# Patient Record
Sex: FEMALE | Race: WHITE | Employment: OTHER | URBAN - METROPOLITAN AREA
[De-identification: names, ages, dates, MRNs, and addresses within clinical notes are randomized per-mention and may not be internally consistent; named-entity substitution may affect disease eponyms.]

---

## 2017-01-16 ENCOUNTER — TELEPHONE (OUTPATIENT)
Dept: SURGERY | Facility: CLINIC | Age: 52
End: 2017-01-16

## 2017-01-16 RX ORDER — FENTANYL 50 UG/H
1 PATCH TRANSDERMAL
Qty: 10 PATCH | Refills: 0 | Status: SHIPPED | OUTPATIENT
Start: 2017-01-16 | End: 2017-02-08 | Stop reason: SINTOL

## 2017-01-16 NOTE — TELEPHONE ENCOUNTER
Patient informed of 48 hour refill policy excluding weekends and holidays. Further explained patient will not receive a call back once prescription is ready. Pt's  Binh Jasmine will  medication at [place] office, [address].

## 2017-01-16 NOTE — TELEPHONE ENCOUNTER
Spoke with pt who is requesting a Fentanyl patch increase. Pt is currently on Fentanyl 25 mcg. Pt has used Fentanyl patch 50 mcg in the past and had pain relief while using it. Pt was previously instructed by her PCP to apply two Fentanyl 25mcg patches.  Pt

## 2017-01-16 NOTE — TELEPHONE ENCOUNTER
Pt was using 2 patches for a period of time d.t dr Jessica Santizo rec to apply two patches so she is early on refill. I have approved increase to 50 mcg fentanyl patch and early refill. Pt has cancer pain.

## 2017-01-16 NOTE — TELEPHONE ENCOUNTER
Patient made aware of message below. Bethany Painter appreciative. No further needs.     Rx ready for pickup, at  in [address] [facility] office, [address]

## 2017-01-26 NOTE — TELEPHONE ENCOUNTER
Followed up with Dr. Salinas Later regarding message below. Per Dr. Salinas Later, pt should decrease Fentanyl dose to 25mcg/hr patch as 50mcg/hr patch causing undesirable side effects. Called pt and informed her of feedback from Dr. Salinas Later.   Pt states she is currently

## 2017-01-26 NOTE — TELEPHONE ENCOUNTER
Spoke with pt who reports removing her 50mcg Fentanyl patch d/t side effects. Pt reports side effects of nausea and vomiting. Pt states that she took off the 50mcg Fentanyl patch d/t vomiting.  After removing the 50mcg Fentanyl patch pt was feeling shaky an

## 2017-01-27 RX ORDER — FENTANYL 25 UG/H
1 PATCH TRANSDERMAL
Qty: 10 PATCH | Refills: 0 | Status: SHIPPED | OUTPATIENT
Start: 2017-01-27 | End: 2017-02-08

## 2017-01-27 NOTE — TELEPHONE ENCOUNTER
Patient called and side effects at 50 µg were confirmed. Prescription was authorized for fentanyl 25 µg every 72 hours 10 patches were dispensed. She will follow-up with Dr. Barron Conway as scheduled.     Placed at 26 for  to pickup

## 2017-02-08 ENCOUNTER — OFFICE VISIT (OUTPATIENT)
Dept: SURGERY | Facility: CLINIC | Age: 52
End: 2017-02-08

## 2017-02-08 VITALS
HEIGHT: 63 IN | DIASTOLIC BLOOD PRESSURE: 82 MMHG | HEART RATE: 82 BPM | WEIGHT: 107 LBS | RESPIRATION RATE: 18 BRPM | SYSTOLIC BLOOD PRESSURE: 113 MMHG | BODY MASS INDEX: 18.96 KG/M2

## 2017-02-08 DIAGNOSIS — M79.7 FIBROMYALGIA: ICD-10-CM

## 2017-02-08 DIAGNOSIS — G04.90 LIMBIC ENCEPHALITIS: Primary | ICD-10-CM

## 2017-02-08 PROCEDURE — 99214 OFFICE O/P EST MOD 30 MIN: CPT | Performed by: ANESTHESIOLOGY

## 2017-02-08 RX ORDER — FENTANYL 25 UG/H
1 PATCH TRANSDERMAL
Qty: 10 PATCH | Refills: 0 | Status: SHIPPED | OUTPATIENT
Start: 2017-02-08 | End: 2017-03-08

## 2017-02-08 RX ORDER — FENTANYL 12 UG/H
1 PATCH TRANSDERMAL
Qty: 10 PATCH | Refills: 0 | Status: SHIPPED | OUTPATIENT
Start: 2017-02-08 | End: 2017-03-08

## 2017-02-08 NOTE — PATIENT INSTRUCTIONS
Refill policies:    • Allow 2 business days for refills; controlled substances may take longer.   • Contact your pharmacy at least 5 days prior to running out of medication and have them send an electronic request or submit request through the “request re your physician has recommended that you have a procedure or additional testing performed. [facility] will contact your insurance carrier to obtain pre-certification or prior authorization.     Unfortunately, [facility] has seen an increas

## 2017-02-12 NOTE — PROGRESS NOTES
Name: William Horton   : 10/5/1965   DOS: 2017     Pain Clinic Follow Up Visit:   William Horton is a 46year old female who presents for recheck of her chronic generalized pain.   The patient had an capsulitis after that she double up autoimmune positive bilaterally. Flexion of the spine makes the pain better, extension and lateral rotation of the spine makes the pain worse. Straight leg raising test is negative bilaterally now. Motor 5 out of 5, sensory is intact and reflexes 2+.  Dorsalis pedis i

## 2017-02-15 ENCOUNTER — PATIENT MESSAGE (OUTPATIENT)
Dept: SURGERY | Facility: CLINIC | Age: 52
End: 2017-02-15

## 2017-02-16 NOTE — TELEPHONE ENCOUNTER
Spoke with pt, scheduled follow up appointment for tomorrow 2/17/2017 @ 1130am. Pt thankful, no further questions at this time.

## 2017-02-16 NOTE — TELEPHONE ENCOUNTER
Lalo Camarillo 2/16/2017 7:42 AM CST        ----- Message -----   From:  Williams Masters   Sent: 2/15/2017 6:14 PM   To: [facility]  Subject: Visit Follow-up Question     Dear Dr Salinas Later,  In the early afternoon on Tuesday, February 14, my vomitin

## 2017-02-16 NOTE — TELEPHONE ENCOUNTER
Notified Dr. Mariela Turk of patient message, Dr. Mariela Turk requests that she be worked into the schedule tomorrow, 2/17/17 to discuss in the office. Thanks!

## 2017-02-17 ENCOUNTER — OFFICE VISIT (OUTPATIENT)
Dept: SURGERY | Facility: CLINIC | Age: 52
End: 2017-02-17

## 2017-02-17 VITALS
SYSTOLIC BLOOD PRESSURE: 118 MMHG | WEIGHT: 106 LBS | DIASTOLIC BLOOD PRESSURE: 64 MMHG | RESPIRATION RATE: 16 BRPM | HEIGHT: 63 IN | BODY MASS INDEX: 18.78 KG/M2 | HEART RATE: 96 BPM

## 2017-02-17 DIAGNOSIS — G04.90 LIMBIC ENCEPHALITIS: Primary | ICD-10-CM

## 2017-02-17 DIAGNOSIS — M79.7 FIBROMYALGIA: ICD-10-CM

## 2017-02-17 PROCEDURE — 99214 OFFICE O/P EST MOD 30 MIN: CPT | Performed by: ANESTHESIOLOGY

## 2017-02-17 NOTE — PATIENT INSTRUCTIONS
Refill policies:    • Allow 2 business days for refills; controlled substances may take longer.   • Contact your pharmacy at least 5 days prior to running out of medication and have them send an electronic request or submit request through the “request re your physician has recommended that you have a procedure or additional testing performed. [facility] will contact your insurance carrier to obtain pre-certification or prior authorization.     Unfortunately, [facility] has seen an increas

## 2017-02-18 NOTE — PROGRESS NOTES
Name: Nabeel Garrett   : 10/5/1965   DOS: 2017     Pain Clinic Follow Up Visit:   Nabeel Garrett is a 46year old female who presents for recheck of her chronic generalized pain.   The patient had limbic encephalitis after that she developed aut Maryanne Marlene test is positive bilaterally. Flexion of the spine makes the pain better, extension and lateral rotation of the spine makes the pain worse. Straight leg raising test is negative bilaterally now. Motor 5 out of 5, sensory is intact and reflexes 2+.

## 2017-02-23 ENCOUNTER — TELEPHONE (OUTPATIENT)
Dept: FAMILY MEDICINE CLINIC | Facility: CLINIC | Age: 52
End: 2017-02-23

## 2017-02-23 NOTE — TELEPHONE ENCOUNTER
Spoke to pt to inform her to schedule her Medicare Annual Well Visit w/Dr Brea Olvera  Pt DECLINED at this time

## 2017-03-07 NOTE — TELEPHONE ENCOUNTER
Patient informed of 48 hour refill policy excluding weekends and holidays. Further explained patient will not receive a call back once prescription is ready. Pierre Ohara will  medication at [place], [address].

## 2017-03-08 NOTE — TELEPHONE ENCOUNTER
**PATIENT NEEDS NARCOTIC CONTRACT ON FILE**    Medication: Fentanyl 12 MCG/HR,  Fentanyl 25 MCG/HR    Date of last refill: both filled 2/8/17  [place] Reviewed Fentanyl 12- 2/9/17,  Fentanyl 25- 2/19/17    Last office vi

## 2017-03-09 NOTE — TELEPHONE ENCOUNTER
i have approved the two fentanyl rx's even though patient is not here to sign narcotic agreement. Pt's  here to . Pt has appoitment with Dr Chelsey Fenton on Monday.  I would like pt to come in Monday to sign agreement and she will need Urine tox screen

## 2017-03-13 ENCOUNTER — APPOINTMENT (OUTPATIENT)
Dept: HEMATOLOGY/ONCOLOGY | Facility: HOSPITAL | Age: 52
End: 2017-03-13
Attending: INTERNAL MEDICINE
Payer: MEDICARE

## 2017-03-16 ENCOUNTER — APPOINTMENT (OUTPATIENT)
Dept: HEMATOLOGY/ONCOLOGY | Facility: HOSPITAL | Age: 52
End: 2017-03-16
Attending: INTERNAL MEDICINE
Payer: MEDICARE

## 2017-03-28 ENCOUNTER — TELEPHONE (OUTPATIENT)
Dept: SURGERY | Facility: CLINIC | Age: 52
End: 2017-03-28

## 2017-03-29 NOTE — TELEPHONE ENCOUNTER
Spoke with pt who would like to wean off the Fentanyl patch. Pt states that she does not feel like she needs the Fentanyl patch anymore. Pt states that she has a few Fentanyl patches 12mcg and 25mcg left.  Pt scheduled follow up appointment for 4/5/2017 @ 9

## 2017-04-05 ENCOUNTER — NURSE ONLY (OUTPATIENT)
Dept: HEMATOLOGY/ONCOLOGY | Facility: HOSPITAL | Age: 52
End: 2017-04-05
Attending: INTERNAL MEDICINE
Payer: MEDICARE

## 2017-04-05 ENCOUNTER — OFFICE VISIT (OUTPATIENT)
Dept: SURGERY | Facility: CLINIC | Age: 52
End: 2017-04-05

## 2017-04-05 VITALS
RESPIRATION RATE: 16 BRPM | DIASTOLIC BLOOD PRESSURE: 68 MMHG | HEART RATE: 79 BPM | BODY MASS INDEX: 19.67 KG/M2 | HEIGHT: 63 IN | SYSTOLIC BLOOD PRESSURE: 112 MMHG | WEIGHT: 111 LBS

## 2017-04-05 DIAGNOSIS — M79.7 FIBROMYALGIA: ICD-10-CM

## 2017-04-05 DIAGNOSIS — G04.90 LIMBIC ENCEPHALITIS: Primary | ICD-10-CM

## 2017-04-05 DIAGNOSIS — D50.0 IRON DEFICIENCY ANEMIA SECONDARY TO BLOOD LOSS (CHRONIC): Primary | ICD-10-CM

## 2017-04-05 DIAGNOSIS — M79.18 MYOFASCIAL PAIN SYNDROME: ICD-10-CM

## 2017-04-05 PROCEDURE — 99213 OFFICE O/P EST LOW 20 MIN: CPT | Performed by: NURSE PRACTITIONER

## 2017-04-05 PROCEDURE — 85025 COMPLETE CBC W/AUTO DIFF WBC: CPT

## 2017-04-05 PROCEDURE — 36415 COLL VENOUS BLD VENIPUNCTURE: CPT

## 2017-04-05 PROCEDURE — 82728 ASSAY OF FERRITIN: CPT

## 2017-04-05 RX ORDER — FENTANYL 12 UG/H
1 PATCH TRANSDERMAL
Qty: 10 PATCH | Refills: 0 | Status: SHIPPED | OUTPATIENT
Start: 2017-04-05 | End: 2017-05-01

## 2017-04-05 NOTE — PATIENT INSTRUCTIONS
Refill policies:    • Allow 2 business days for refills; controlled substances may take longer.   • Contact your pharmacy at least 5 days prior to running out of medication and have them send an electronic request or submit request through the “request re insurance carrier to obtain pre-certification or prior authorization. Unfortunately, [facility] has seen an increase in denial of payment even though the procedure/test has been pre-certified.   You are strongly encouraged to contact your insurance carrier to v

## 2017-04-06 ENCOUNTER — TELEPHONE (OUTPATIENT)
Dept: SURGERY | Facility: CLINIC | Age: 52
End: 2017-04-06

## 2017-04-06 NOTE — TELEPHONE ENCOUNTER
Spoke w/ pt who states she has symptoms of \"skin crawling\" and feels extremely uncomfortable. Pt concerned she will not be able to tolerate symptoms through process and was told she should inform the office. Pt states she has been unable to sleep.   Inf

## 2017-04-06 NOTE — TELEPHONE ENCOUNTER
Discuss with Dr. Namrata Stinson, patient beginning tapering Fentanyl per instructions given yesterday. She reported a crawling sensation today which has been known to her in the past when trying to taper this medication.  She was instructed that this symptom should b

## 2017-04-07 NOTE — TELEPHONE ENCOUNTER
I have discussed with Dr. Marialuisa Moran, he recommends if she can hang in there a few days these symptoms should resolve. She should try conservative measures such as yoga, stretching, prayer, meditation.  She can try an over the counter capsaicin cream (made from p

## 2017-04-12 RX ORDER — GABAPENTIN 100 MG/1
CAPSULE ORAL
Qty: 270 CAPSULE | Refills: 0 | OUTPATIENT
Start: 2017-04-12

## 2017-04-12 RX ORDER — GABAPENTIN 100 MG/1
100 CAPSULE ORAL EVERY 8 HOURS
Qty: 90 CAPSULE | Refills: 0 | Status: SHIPPED | OUTPATIENT
Start: 2017-04-12 | End: 2017-07-27

## 2017-04-12 RX ORDER — FENTANYL 25 UG/H
1 PATCH TRANSDERMAL
Qty: 5 PATCH | Refills: 0 | Status: SHIPPED | OUTPATIENT
Start: 2017-04-18 | End: 2017-05-01

## 2017-04-12 NOTE — TELEPHONE ENCOUNTER
Patient informed of message below and understood. She will call us if there are any issues with the gabapentin. She also stated she would need more patches to complete the 4 week tapering of the Fentanyl 25 mcg.     Fentanyl 25 mg #10 were filled on 3/20/17

## 2017-04-12 NOTE — TELEPHONE ENCOUNTER
Script for additional patches were filled and faxed to patient's pharmacy with below tapering instructions. Confirmation received.

## 2017-04-12 NOTE — TELEPHONE ENCOUNTER
Patient called back and was informed of the message below.  Patient also stated she wanted to let our office know that when she decreased the fentanyl patch from 50 mcg to 25 mcg within 24 hours she had a seizure (thursday night 4/6/17.) States she is doing

## 2017-04-12 NOTE — TELEPHONE ENCOUNTER
Given this circumstance, Dr. Naty Porter recommends for her to stay on Fentanyl 25mcg for a full 4 weeks, then decrease to Fentanyl 12mcg for 6 weeks. He also recommends that she try gabapentin 100mg every 8 hours.  I will send the script to her pharmacy for the g

## 2017-04-13 ENCOUNTER — APPOINTMENT (OUTPATIENT)
Dept: HEMATOLOGY/ONCOLOGY | Facility: HOSPITAL | Age: 52
End: 2017-04-13
Payer: MEDICARE

## 2017-04-17 NOTE — PROGRESS NOTES
Name: Leonid Galindo   : 10/5/1965   DOS: 2017     Pain Clinic Follow Up Visit:   Leonid Galindo is a 46year old female who presents for recheck of her chronic generalized pain. She was last seen in our office on 2017.   She is he Pulse 79  Resp 16  Ht 63\"  Wt 111 lb  BMI 19.67 kg/m2  General: 46year old female in no acute distress. Neurologic: Alert, oriented, articulate. Normal gait. Psychiatric: Appropriate mood.     ASSESSMENT AND PLAN:   Limbic encephalitis  (primary encoun contact our office if this medication is not sufficient to help manage pain. Other treatment options were discussed with Dr. June Ortiz today including topical options, he does not feel that these would be helpful to manage crawling skin sensation.   I have d

## 2017-04-20 ENCOUNTER — PATIENT OUTREACH (OUTPATIENT)
Dept: CASE MANAGEMENT | Age: 52
End: 2017-04-20

## 2017-04-20 ENCOUNTER — TELEPHONE (OUTPATIENT)
Dept: FAMILY MEDICINE CLINIC | Facility: CLINIC | Age: 52
End: 2017-04-20

## 2017-04-20 ENCOUNTER — TELEPHONE (OUTPATIENT)
Dept: CASE MANAGEMENT | Age: 52
End: 2017-04-20

## 2017-04-20 DIAGNOSIS — E03.9 ACQUIRED HYPOTHYROIDISM: Primary | Chronic | ICD-10-CM

## 2017-04-20 RX ORDER — LEVOTHYROXINE SODIUM 0.1 MG/1
100 TABLET ORAL
Qty: 90 TABLET | Refills: 1 | Status: SHIPPED | OUTPATIENT
Start: 2017-04-20 | End: 2017-11-11

## 2017-04-20 RX ORDER — LEVOTHYROXINE SODIUM 0.1 MG/1
100 TABLET ORAL
Qty: 90 TABLET | Refills: 1 | COMMUNITY
Start: 2017-04-20 | End: 2017-04-24

## 2017-04-20 RX ORDER — LEVOTHYROXINE SODIUM 0.1 MG/1
TABLET ORAL
Qty: 90 TABLET | Refills: 0 | OUTPATIENT
Start: 2017-04-20

## 2017-04-20 NOTE — PROGRESS NOTES
4/20/2017  Called and LM on identified home phone requesting patient return my call at [phone]. Called to introduce CCM service.

## 2017-04-20 NOTE — TELEPHONE ENCOUNTER
Just spoke to pt who reports she is currently out of Levothyroxine and needs refill. She thought pharmacy had contacted the office but is unsure.    Thyroid:    Lab Results  Component Value Date   TSH 2.160 05/17/2016   TSH 11.000* 04/11/2016   TSH 0.099* 0

## 2017-04-20 NOTE — TELEPHONE ENCOUNTER
LOV 10/06/2016 with Dr Donavan Severe the RX for levothyroxine 100 mcg is new with hospitalization so not on protocol as it is new med to patient

## 2017-04-20 NOTE — PROGRESS NOTES
4/20/2017  Returned incoming call to pt. Pt states she her Rx for Gabapentin was denied through [address] Explained to pt that initial script looks like it was 90 days and insurance may only cover 30 days - which new script was sent for.  Informed pt I conta

## 2017-04-20 NOTE — TELEPHONE ENCOUNTER
Spoke to pt who states she has had 2 recent seizures on 04/13 & 04/17 which she said PCP is aware of. She is weaning off Fentanyl and reports \"creepy crawling\" feeling on skin and unable to sit still - describes Akathisia.  Informed pt script for Gabapent

## 2017-04-21 ENCOUNTER — PATIENT OUTREACH (OUTPATIENT)
Dept: CASE MANAGEMENT | Age: 52
End: 2017-04-21

## 2017-04-21 NOTE — TELEPHONE ENCOUNTER
Called and spoke with pt regarding recommendation below. Pt picked up script last night and started script as Gabapentin 100 mg every 8 hours as ordered by [facility].  Pt reports initially felt like akathisia was worse upon taking Gabapentin; however,

## 2017-04-21 NOTE — PROGRESS NOTES
4/21/2017  Called to f/up with pt regarding akathisia sx and f/up after starting Gabapentin. Pt reports started Gabapentin last night and initially did not feel better but reports feeling worse.  However, pt states she has taken 3 doses as of now, and repor

## 2017-04-24 ENCOUNTER — TELEPHONE (OUTPATIENT)
Dept: CASE MANAGEMENT | Age: 52
End: 2017-04-24

## 2017-04-24 ENCOUNTER — PATIENT OUTREACH (OUTPATIENT)
Dept: CASE MANAGEMENT | Age: 52
End: 2017-04-24

## 2017-04-24 NOTE — TELEPHONE ENCOUNTER
From what I understand, Dr. Lashell Stanford gave orders for Gabapentin and were placed by Jacobo JEFFERS.

## 2017-04-24 NOTE — TELEPHONE ENCOUNTER
Reviewed OV note from 4/5/17, [facility] Reeves, noted \"asked for her to contact our office she experiences any difficulty weaning off of fentanyl and we will can continue to consider other nonnarcotic options if needed. \"    Please have patient contact EN

## 2017-04-24 NOTE — PROGRESS NOTES
4/24/2017  Returned incoming call from pt who reports she is taking Gabapentin every 8 hours as instructed; however, has noticed no significant changes in sx.  States still having \"creepy crawling skin feeling\", profuse sweating episodes, along with pacin

## 2017-04-24 NOTE — TELEPHONE ENCOUNTER
Dr Veronica Gutiérrez did not order Neurontin. Pt is seeing neurology for these symptoms. Was this suppose to go to Neurology ? Routed to [address] [place] [address].

## 2017-04-24 NOTE — TELEPHONE ENCOUNTER
Pt contacted [facility] today stating that she has been taking Gabapentin every 8 hours; however, no significant improvement in \"creepy crawly feeling on my skin\". Reports pacing and constantly having the urge to move around - causing \"sore muscles\".  Pt state

## 2017-04-25 ENCOUNTER — PATIENT OUTREACH (OUTPATIENT)
Dept: CASE MANAGEMENT | Age: 52
End: 2017-04-25

## 2017-04-25 NOTE — TELEPHONE ENCOUNTER
Called and informed pt of recommendation to follow up with Dr. Madeline Rodriguez office. Pt voiced understanding and agreed with plan.

## 2017-04-25 NOTE — PROGRESS NOTES
4/25/2017  Called and LM on identified home phone requesting patient return my call at [phone]. Received message from PCP office w/ note below requesting pt contact Dr. Liv Rasmussen office to f/up.     Please have patient contact [facility] at     [address]

## 2017-05-01 ENCOUNTER — PATIENT OUTREACH (OUTPATIENT)
Dept: CASE MANAGEMENT | Age: 52
End: 2017-05-01

## 2017-05-01 DIAGNOSIS — D50.0 IRON DEFICIENCY ANEMIA DUE TO CHRONIC BLOOD LOSS: ICD-10-CM

## 2017-05-01 DIAGNOSIS — N30.00 ACUTE CYSTITIS WITHOUT HEMATURIA: Primary | ICD-10-CM

## 2017-05-01 DIAGNOSIS — G47.10 UNCONTROLLED HYPERSOMNIA: Chronic | ICD-10-CM

## 2017-05-01 DIAGNOSIS — G04.90 LIMBIC ENCEPHALITIS: ICD-10-CM

## 2017-05-01 DIAGNOSIS — E06.3 CHRONIC LYMPHOCYTIC THYROIDITIS: Chronic | ICD-10-CM

## 2017-05-01 DIAGNOSIS — F33.0 MILD RECURRENT MAJOR DEPRESSION (HCC): Chronic | ICD-10-CM

## 2017-05-01 DIAGNOSIS — G89.4 CHRONIC PAIN SYNDROME: ICD-10-CM

## 2017-05-01 DIAGNOSIS — D63.8 ANEMIA OF CHRONIC DISEASE: ICD-10-CM

## 2017-05-01 DIAGNOSIS — E03.9 ACQUIRED HYPOTHYROIDISM: Chronic | ICD-10-CM

## 2017-05-01 DIAGNOSIS — F90.0 ADHD (ATTENTION DEFICIT HYPERACTIVITY DISORDER), INATTENTIVE TYPE: Chronic | ICD-10-CM

## 2017-05-01 PROCEDURE — 99490 CHRNC CARE MGMT STAFF 1ST 20: CPT

## 2017-05-01 RX ORDER — IBUPROFEN 200 MG
600 TABLET ORAL EVERY 4 HOURS PRN
COMMUNITY
End: 2017-12-18 | Stop reason: ALTCHOICE

## 2017-05-01 RX ORDER — ACETAMINOPHEN 325 MG/1
650 TABLET ORAL EVERY 4 HOURS PRN
COMMUNITY
End: 2017-06-14 | Stop reason: ALTCHOICE

## 2017-05-01 NOTE — TELEPHONE ENCOUNTER
Pt reports she is on last refill for Venlafaxine XR. Please send request for medication refill to PCP. Last ordered: 03/25/16 Qty 180 tablets with 3 refills. TRIAGE: Request for Venlafaxine XR refill. Thank you.

## 2017-05-01 NOTE — PROGRESS NOTES
5/1/2017  Spoke to pt at length about CCM, current care plan and performed CCM assessment with pt reviewed meds and compliance.  Reviewed pt Patient Active Problem List:     Hyperlipidemia LDL goal < 130     Sciatica     ADHD (attention deficit hyperactivit does use intranet. Discussed benefits of volunteer program like \"[employer]\". Pt states she will look this up. Pt does not drive.    Exercise:  Reports limited mobility usually secondary to pain; however, recently limited mobility was because of withdr Campbell Cormier MD.  Aminata GARCIA, CCM

## 2017-05-01 NOTE — TELEPHONE ENCOUNTER
LOV 10/6/2016 with Dr Brea Olvear not on protocol sent to Dr Brenna Dahl to refill.  Order placed in [place]

## 2017-05-02 ENCOUNTER — TELEPHONE (OUTPATIENT)
Dept: SURGERY | Facility: CLINIC | Age: 52
End: 2017-05-02

## 2017-05-02 NOTE — TELEPHONE ENCOUNTER
Pt calling to inform office that she \"went off fentanyl\" cold turkey over a week ago with help from her Chronic Care RN referred to her by Dr. Sekou Sandra. Pt notifying office at the request of chronic care RN to update on pt status.   Pt states she has some re

## 2017-05-11 ENCOUNTER — PATIENT MESSAGE (OUTPATIENT)
Dept: FAMILY MEDICINE CLINIC | Facility: CLINIC | Age: 52
End: 2017-05-11

## 2017-05-12 RX ORDER — TRAZODONE HYDROCHLORIDE 100 MG/1
TABLET ORAL NIGHTLY PRN
Qty: 90 TABLET | Refills: 0 | Status: SHIPPED | OUTPATIENT
Start: 2017-05-12 | End: 2017-07-11

## 2017-05-12 NOTE — TELEPHONE ENCOUNTER
From: Josefina Mcpherson  To: Maria Dolores Vaz MD  Sent: 5/11/2017 11:06 PM CDT  Subject: Non-Urgent Medical Question    Dear Dr Jt Cho,    I wanted to contact you regarding my inability to sleep, which has persisted for weeks. It is not that I fall asleep and wake-up, I am unable to go to sleep at night, even though I may have only gotten an hour of sleep the night before. I have tried melatonin, Benadryl, warm milk, night-time teas, herbs and various other over-the-counter sleep aides. Usually, I will sleep for a couple of hours (1-3) during the day. Is there anything to do about this? I do not know what else to try. I was going to contact Aminata, however, I know this is a short day for you. I wanted to go directly to you in hope of an answer before going into the weekend.      Thank you  Joseifna Mcpherson

## 2017-06-06 ENCOUNTER — PATIENT OUTREACH (OUTPATIENT)
Dept: CASE MANAGEMENT | Age: 52
End: 2017-06-06

## 2017-06-14 ENCOUNTER — TELEPHONE (OUTPATIENT)
Dept: FAMILY MEDICINE CLINIC | Facility: CLINIC | Age: 52
End: 2017-06-14

## 2017-06-14 ENCOUNTER — PATIENT OUTREACH (OUTPATIENT)
Dept: CASE MANAGEMENT | Age: 52
End: 2017-06-14

## 2017-06-14 DIAGNOSIS — E78.5 HYPERLIPIDEMIA WITH TARGET LOW DENSITY LIPOPROTEIN (LDL) CHOLESTEROL LESS THAN 130 MG/DL: Primary | ICD-10-CM

## 2017-06-14 DIAGNOSIS — Z12.31 ENCOUNTER FOR SCREENING MAMMOGRAM FOR MALIGNANT NEOPLASM OF BREAST: Primary | ICD-10-CM

## 2017-06-14 DIAGNOSIS — F33.0 MILD RECURRENT MAJOR DEPRESSION (HCC): Chronic | ICD-10-CM

## 2017-06-14 DIAGNOSIS — E55.9 VITAMIN D DEFICIENCY, UNSPECIFIED: ICD-10-CM

## 2017-06-14 PROCEDURE — 99490 CHRNC CARE MGMT STAFF 1ST 20: CPT

## 2017-06-14 NOTE — TELEPHONE ENCOUNTER
Patient is due for her mammogram and patient was also requesting to have her vitamin d drawn. Patient is due for a lipid but patient wants to hold off as her diet has mostly been a liquid diet.     Please sign off on pending orders    Thank you

## 2017-06-14 NOTE — PROGRESS NOTES
Patient returning my call from last week. I introduced myself as her new care manager. Patient was telling me how she recently went off her pain meds the end of March and has been doing great without them.  Patient is still having a hard time with falling a

## 2017-06-21 ENCOUNTER — APPOINTMENT (OUTPATIENT)
Dept: LAB | Age: 52
End: 2017-06-21
Attending: FAMILY MEDICINE
Payer: MEDICARE

## 2017-06-21 ENCOUNTER — OFFICE VISIT (OUTPATIENT)
Dept: FAMILY MEDICINE CLINIC | Facility: CLINIC | Age: 52
End: 2017-06-21

## 2017-06-21 VITALS
RESPIRATION RATE: 14 BRPM | SYSTOLIC BLOOD PRESSURE: 132 MMHG | HEIGHT: 62 IN | TEMPERATURE: 98 F | WEIGHT: 116 LBS | DIASTOLIC BLOOD PRESSURE: 84 MMHG | BODY MASS INDEX: 21.35 KG/M2 | HEART RATE: 86 BPM

## 2017-06-21 DIAGNOSIS — E55.9 VITAMIN D DEFICIENCY, UNSPECIFIED: ICD-10-CM

## 2017-06-21 DIAGNOSIS — F90.0 ADHD (ATTENTION DEFICIT HYPERACTIVITY DISORDER), INATTENTIVE TYPE: Chronic | ICD-10-CM

## 2017-06-21 DIAGNOSIS — G89.4 CHRONIC PAIN SYNDROME: ICD-10-CM

## 2017-06-21 DIAGNOSIS — E03.9 ACQUIRED HYPOTHYROIDISM: Chronic | ICD-10-CM

## 2017-06-21 DIAGNOSIS — G04.81: Primary | Chronic | ICD-10-CM

## 2017-06-21 DIAGNOSIS — Z12.31 VISIT FOR SCREENING MAMMOGRAM: ICD-10-CM

## 2017-06-21 DIAGNOSIS — F33.0 MILD RECURRENT MAJOR DEPRESSION (HCC): Chronic | ICD-10-CM

## 2017-06-21 PROCEDURE — 84443 ASSAY THYROID STIM HORMONE: CPT

## 2017-06-21 PROCEDURE — 99214 OFFICE O/P EST MOD 30 MIN: CPT | Performed by: FAMILY MEDICINE

## 2017-06-21 PROCEDURE — 84439 ASSAY OF FREE THYROXINE: CPT

## 2017-06-21 PROCEDURE — 82306 VITAMIN D 25 HYDROXY: CPT

## 2017-06-21 PROCEDURE — 36415 COLL VENOUS BLD VENIPUNCTURE: CPT

## 2017-06-21 NOTE — PATIENT INSTRUCTIONS
OK to increase trazodone to 150 nightly and can add 50 mg extra every 3 to 5 days.  It comes 100, 150 and 300 mg, so let me know what dose works best.
Refer to the paper Trauma Flowsheet documentation

## 2017-06-21 NOTE — PROGRESS NOTES
Patient presents with:  Sleep Problem: PT states she only sleep for 4 hours and then she wakes up and can't get back to sleep.        HPI:   This is a 46year old female coming in for sleep issues, off pain meds, still some twitching, off opiods x 1 months, Negative for joint swelling. Skin: Negative. Negative for rash. Allergic/Immunologic: Negative for immunocompromised state. Neurological: Negative. Negative for dizziness, weakness and numbness. Hematological: Negative for adenopathy.         EXAM function , will follow         Mild recurrent major depression (HCC) (Chronic)    Overview     Venlafaxine  daily, trazodone 1         Current Assessment & Plan     Ok to titrate trazodone up to 300 noghtly if needed, contact via [employer] with update

## 2017-07-11 RX ORDER — TRAZODONE HYDROCHLORIDE 100 MG/1
TABLET ORAL NIGHTLY PRN
Qty: 90 TABLET | Refills: 3 | Status: SHIPPED
Start: 2017-07-11 | End: 2017-07-13

## 2017-07-11 NOTE — TELEPHONE ENCOUNTER
From: Williams Masters  Sent: 7/11/2017 10:33 AM CDT  Subject: Medication Renewal Request    Mini Pimentel would like a refill of the following medications:  TraZODone HCl 100 MG Oral Tab Tracey Webb MD]    Preferred pharmacy: [place] [phone]

## 2017-07-13 RX ORDER — TRAZODONE HYDROCHLORIDE 100 MG/1
TABLET ORAL NIGHTLY PRN
Qty: 90 TABLET | Refills: 3 | Status: SHIPPED
Start: 2017-07-13 | End: 2017-07-19 | Stop reason: DRUGHIGH

## 2017-07-13 NOTE — TELEPHONE ENCOUNTER
LOV 6/21/17,and at that time, Dr Donavan Severe said  OK to increase trazodone to 150 nightly and can add 50 mg extra every 3 to 5 days.  It comes 100, 150 and 300 mg, so let me know what dose works best.        Pt states that she is currently taking Trazodone 300 mg

## 2017-07-13 NOTE — TELEPHONE ENCOUNTER
From: Twyla Sears  Sent: 7/13/2017 4:48 PM CDT  Subject: Medication Renewal Request    Mini Flores would like a refill of the following medications:  TraZODone HCl 100 MG Oral Tab Ector Reich MD]    Preferred pharmacy: [place] [phone]

## 2017-07-14 ENCOUNTER — PATIENT MESSAGE (OUTPATIENT)
Dept: FAMILY MEDICINE CLINIC | Facility: CLINIC | Age: 52
End: 2017-07-14

## 2017-07-14 ENCOUNTER — PATIENT OUTREACH (OUTPATIENT)
Dept: CASE MANAGEMENT | Age: 52
End: 2017-07-14

## 2017-07-15 NOTE — TELEPHONE ENCOUNTER
From: Juan Luis Hayden  To: Brie Mendoza MD  Sent: 7/14/2017 7:47 PM CDT  Subject: Other    Hi Dr Mayra Cabral,  I am in need of a Trazodone prescription. In the last week, THE [facility] has called in two Trazodone prescriptions for the wrong dosage.  I have sent in two

## 2017-07-16 RX ORDER — TRAZODONE HYDROCHLORIDE 300 MG/1
300 TABLET ORAL NIGHTLY
Qty: 90 TABLET | Refills: 3 | Status: SHIPPED | OUTPATIENT
Start: 2017-07-16 | End: 2017-07-19

## 2017-07-18 ENCOUNTER — TELEPHONE (OUTPATIENT)
Dept: FAMILY MEDICINE CLINIC | Facility: CLINIC | Age: 52
End: 2017-07-18

## 2017-07-18 NOTE — TELEPHONE ENCOUNTER
Received incoming fax from Leeann from TraZODone HCl 300 MG Oral Tab. LM explaining process. Form in triage.

## 2017-07-19 ENCOUNTER — TELEPHONE (OUTPATIENT)
Dept: FAMILY MEDICINE CLINIC | Facility: CLINIC | Age: 52
End: 2017-07-19

## 2017-07-19 ENCOUNTER — PATIENT OUTREACH (OUTPATIENT)
Dept: CASE MANAGEMENT | Age: 52
End: 2017-07-19

## 2017-07-19 DIAGNOSIS — F33.0 MILD RECURRENT MAJOR DEPRESSION (HCC): Chronic | ICD-10-CM

## 2017-07-19 DIAGNOSIS — M41.20 SCOLIOSIS (AND KYPHOSCOLIOSIS), IDIOPATHIC: Chronic | ICD-10-CM

## 2017-07-19 DIAGNOSIS — E78.5 HYPERLIPIDEMIA WITH TARGET LOW DENSITY LIPOPROTEIN (LDL) CHOLESTEROL LESS THAN 130 MG/DL: ICD-10-CM

## 2017-07-19 PROCEDURE — 99490 CHRNC CARE MGMT STAFF 1ST 20: CPT

## 2017-07-19 RX ORDER — TRAZODONE HYDROCHLORIDE 100 MG/1
300 TABLET ORAL NIGHTLY
Qty: 270 TABLET | Refills: 3 | Status: SHIPPED | OUTPATIENT
Start: 2017-07-19 | End: 2018-08-23

## 2017-07-19 NOTE — TELEPHONE ENCOUNTER
Pharmacy asked if Trazadone script be changed to 100 mg three tablets daily because insurance does not ant to pay for 300 mg tablets. Will you authorize pended order? Routed to DR Norris.

## 2017-07-19 NOTE — TELEPHONE ENCOUNTER
Pharmacy is calling due to TraZODone HCl 300 MG Oral Tab medication. Insurance only covers 100 MG tabs. Would like to know if they can change to 100MG 3 times daily.

## 2017-07-19 NOTE — TELEPHONE ENCOUNTER
Pt called wanting to know if she can start taking her adderal again. She currently has 20 mg tablets at home and wants to know if she can cut them in half. Pt was also wanting to know if we can order a back brace for her.  Pt thinks the back brace would

## 2017-07-19 NOTE — PROGRESS NOTES
Pt returning my call. I spoke with pt to see how she was doing. Pt stated that she has been doing good for the last couple of months. Pt and I were talking about her chronic illness and how on some days she is not able to get out of bed.  Pt stated that she

## 2017-07-20 NOTE — TELEPHONE ENCOUNTER
Began PA process by accessing [employer] form on \"covermymeds\". Entered pertinent info, pt diagnosis of G47.01  (insomnia due to medical condition), failed meds. Sent to plan and printed form.  KEY GALLO

## 2017-07-21 ENCOUNTER — PATIENT OUTREACH (OUTPATIENT)
Dept: CASE MANAGEMENT | Age: 52
End: 2017-07-21

## 2017-07-25 ENCOUNTER — PATIENT OUTREACH (OUTPATIENT)
Dept: CASE MANAGEMENT | Age: 52
End: 2017-07-25

## 2017-07-25 NOTE — TELEPHONE ENCOUNTER
I have left several messages for pt to call me back regarding her back brace. I will await for pt to return my call.

## 2017-07-26 ENCOUNTER — PATIENT OUTREACH (OUTPATIENT)
Dept: CASE MANAGEMENT | Age: 52
End: 2017-07-26
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
## 2017-07-26 NOTE — PROGRESS NOTES
Coordination of education, review of chart, update to record, sending materials:[employer]  Time: 4 min    Total monthly time 29 min

## 2017-07-27 RX ORDER — GABAPENTIN 100 MG/1
100 CAPSULE ORAL EVERY 8 HOURS
Qty: 90 CAPSULE | Refills: 0 | Status: SHIPPED | OUTPATIENT
Start: 2017-07-27 | End: 2017-09-04

## 2017-07-27 NOTE — TELEPHONE ENCOUNTER
Medication: Gabapentin 100mg    Date of last refill: 4/12/2017  [place]    Last office visit: 4/5/2017  Due back to clinic per last office note:  Return if symptoms worsen or fail to improve.   Date next office visit should seek treatment at the emergency department. Risks and benefits of the medications were discussed with patient today.  Patient should contact our office if this medication is not sufficient to help manage pain.      Other treatment options were discu

## 2017-07-31 ENCOUNTER — TELEPHONE (OUTPATIENT)
Dept: FAMILY MEDICINE CLINIC | Facility: CLINIC | Age: 52
End: 2017-07-31

## 2017-08-12 NOTE — PROGRESS NOTES
Patient presents with:  ADD: refill on adderall     HPI:   Juan Luis Hayden is a 46year old female who presents to the office for ADD med refill. Last Adderall written 8/2015. Diagnosed with a brain disease.   Was on 80mg Adderall a day to help stay aw

## 2017-08-21 ENCOUNTER — PATIENT OUTREACH (OUTPATIENT)
Dept: CASE MANAGEMENT | Age: 52
End: 2017-08-21

## 2017-08-21 NOTE — PROGRESS NOTES
Time spent collecting and reviewing patient data, coordination of care 3 minutes     Total monthly time 3 minutes

## 2017-09-01 ENCOUNTER — PATIENT MESSAGE (OUTPATIENT)
Dept: FAMILY MEDICINE CLINIC | Facility: CLINIC | Age: 52
End: 2017-09-01

## 2017-09-01 NOTE — TELEPHONE ENCOUNTER
From: Renée Hernandez  To: Ibis Deleon MD  Sent: 9/1/2017 9:28 AM CDT  Subject: Other    Hi Dr. Nancy Castellanos,  On August 12, I dropped off an envelope for you. It contained some questions I hoped you could answer. I wanted to make sure you received the envelope. Also, is there anything else you need from me to facilitate my request. This is in regards to the news story. Feel free to contact me at home [phone] or email: [email or web address] or through this website, if appropriate. Thank you for working with me on this.     Sincerely,  Renée Hernandez ([phone])

## 2017-09-05 RX ORDER — GABAPENTIN 100 MG/1
CAPSULE ORAL
Qty: 90 CAPSULE | Refills: 0 | Status: SHIPPED | OUTPATIENT
Start: 2017-09-05 | End: 2017-12-18 | Stop reason: ALTCHOICE

## 2017-09-05 NOTE — TELEPHONE ENCOUNTER
Medication: Gabapentin 100mg    Date of last refill: 7/27/2017  [place]    Last office visit: 4/5/2017  Due back to clinic per last office note:  Return if symptoms worsen or fail to improve.   Date next office visit seek treatment at the emergency department. Risks and benefits of the medications were discussed with patient today.  Patient should contact our office if this medication is not sufficient to help manage pain.      Other treatment options were discussed wi

## 2017-09-06 ENCOUNTER — OFFICE VISIT (OUTPATIENT)
Dept: FAMILY MEDICINE CLINIC | Facility: CLINIC | Age: 52
End: 2017-09-06

## 2017-09-06 VITALS
BODY MASS INDEX: 21.06 KG/M2 | HEIGHT: 61.5 IN | WEIGHT: 113 LBS | TEMPERATURE: 98 F | HEART RATE: 96 BPM | DIASTOLIC BLOOD PRESSURE: 70 MMHG | SYSTOLIC BLOOD PRESSURE: 100 MMHG

## 2017-09-06 DIAGNOSIS — R30.9 PAINFUL URINATION: Primary | ICD-10-CM

## 2017-09-06 DIAGNOSIS — N30.00 ACUTE CYSTITIS WITHOUT HEMATURIA: ICD-10-CM

## 2017-09-06 LAB
BILIRUBIN: NEGATIVE
GLUCOSE (URINE DIPSTICK): NEGATIVE MG/DL
KETONES (URINE DIPSTICK): NEGATIVE MG/DL
MULTISTIX LOT#: ABNORMAL NUMERIC
NITRITE, URINE: POSITIVE
OCCULT BLOOD: NEGATIVE
PH, URINE: 7.5 (ref 4.5–8)
SPECIFIC GRAVITY: 1.01 (ref 1–1.03)
URINE-COLOR: YELLOW

## 2017-09-06 PROCEDURE — 81003 URINALYSIS AUTO W/O SCOPE: CPT | Performed by: PHYSICIAN ASSISTANT

## 2017-09-06 PROCEDURE — 99213 OFFICE O/P EST LOW 20 MIN: CPT | Performed by: PHYSICIAN ASSISTANT

## 2017-09-06 PROCEDURE — 87086 URINE CULTURE/COLONY COUNT: CPT | Performed by: PHYSICIAN ASSISTANT

## 2017-09-06 RX ORDER — CEPHALEXIN 500 MG/1
500 CAPSULE ORAL 4 TIMES DAILY
Qty: 28 CAPSULE | Refills: 0 | Status: SHIPPED | OUTPATIENT
Start: 2017-09-06 | End: 2017-09-29 | Stop reason: ALTCHOICE

## 2017-09-06 RX ORDER — FLUCONAZOLE 150 MG/1
150 TABLET ORAL ONCE
Qty: 1 TABLET | Refills: 1 | Status: SHIPPED | OUTPATIENT
Start: 2017-09-06 | End: 2017-09-06

## 2017-09-06 NOTE — PROGRESS NOTES
CC:  Patient presents with:  Painful Urination: since last night, pain when urinating, foul odor, pt has hx of UTI      HPI: [facility] presents with complaints of frequent urination, voiding small amounts, dysuria, and strong odor to her urine.  She denies flank medications on file prior to visit. Review of Systems:     ROS otherwise negative. Physical Exam :  /70   Pulse 96   Temp 98.3 °F (36.8 °C) (Oral)   Ht 61.5\"   Wt 113 lb   BMI 21.01 kg/m²   Body mass index is 21.01 kg/m².     Vital signs revie Hypogammaglobulinemia, acquired ([facility]     Chronic pain syndrome     Iron deficiency anemia due to chronic blood loss     Anemia of chronic disease     Intractable vomiting with nausea     Limbic encephalitis     Intractable vomiting with nausea, unspecifie

## 2017-09-10 ENCOUNTER — PATIENT MESSAGE (OUTPATIENT)
Dept: FAMILY MEDICINE CLINIC | Facility: CLINIC | Age: 52
End: 2017-09-10

## 2017-09-11 NOTE — TELEPHONE ENCOUNTER
From: Dominic Suarez  To: Rolando Toledo  Sent: 9/10/2017 5:35 PM CDT  Subject: Visit Follow-up Question    Rivka Jono,  I was in to see you last Wednesday for an UTI. I have been taking the antibiotics as directed and will continue to do so. Despite this, I am not feeling well. I still have pain when I urinate and feel sickly (like when your body is fighting an infection). I realize I have not finished my course of antibiotics. However, my last UTI included a doctor visit, two emergency room visits and a hospital stay. I did view the test results. This looks like a minor infection, but that is what I was told last time. Thank you for your time.   Dominic Suarez : 10/05/1965

## 2017-09-21 ENCOUNTER — PATIENT OUTREACH (OUTPATIENT)
Dept: CASE MANAGEMENT | Age: 52
End: 2017-09-21

## 2017-09-29 ENCOUNTER — OFFICE VISIT (OUTPATIENT)
Dept: FAMILY MEDICINE CLINIC | Facility: CLINIC | Age: 52
End: 2017-09-29

## 2017-09-29 VITALS
WEIGHT: 110 LBS | DIASTOLIC BLOOD PRESSURE: 74 MMHG | RESPIRATION RATE: 16 BRPM | TEMPERATURE: 98 F | SYSTOLIC BLOOD PRESSURE: 120 MMHG | HEART RATE: 100 BPM | HEIGHT: 62.5 IN | BODY MASS INDEX: 19.74 KG/M2

## 2017-09-29 DIAGNOSIS — F90.0 ADHD (ATTENTION DEFICIT HYPERACTIVITY DISORDER), INATTENTIVE TYPE: Chronic | ICD-10-CM

## 2017-09-29 DIAGNOSIS — E06.3 CHRONIC LYMPHOCYTIC THYROIDITIS: Primary | Chronic | ICD-10-CM

## 2017-09-29 DIAGNOSIS — G04.81: Chronic | ICD-10-CM

## 2017-09-29 DIAGNOSIS — Z12.31 VISIT FOR SCREENING MAMMOGRAM: ICD-10-CM

## 2017-09-29 DIAGNOSIS — F33.0 MILD RECURRENT MAJOR DEPRESSION (HCC): Chronic | ICD-10-CM

## 2017-09-29 DIAGNOSIS — Z00.00 LABORATORY EXAMINATION ORDERED AS PART OF A ROUTINE GENERAL MEDICAL EXAMINATION: ICD-10-CM

## 2017-09-29 PROCEDURE — 99214 OFFICE O/P EST MOD 30 MIN: CPT | Performed by: FAMILY MEDICINE

## 2017-09-29 RX ORDER — DEXTROAMPHETAMINE SACCHARATE, AMPHETAMINE ASPARTATE, DEXTROAMPHETAMINE SULFATE AND AMPHETAMINE SULFATE 5; 5; 5; 5 MG/1; MG/1; MG/1; MG/1
20 TABLET ORAL 2 TIMES DAILY
Qty: 60 TABLET | Refills: 0 | Status: SHIPPED | OUTPATIENT
Start: 2017-11-28 | End: 2018-01-10

## 2017-09-29 RX ORDER — DEXTROAMPHETAMINE SACCHARATE, AMPHETAMINE ASPARTATE, DEXTROAMPHETAMINE SULFATE AND AMPHETAMINE SULFATE 5; 5; 5; 5 MG/1; MG/1; MG/1; MG/1
20 TABLET ORAL 2 TIMES DAILY
Qty: 60 TABLET | Refills: 0 | Status: SHIPPED | OUTPATIENT
Start: 2017-09-29 | End: 2017-10-29

## 2017-09-29 RX ORDER — DEXTROAMPHETAMINE SACCHARATE, AMPHETAMINE ASPARTATE, DEXTROAMPHETAMINE SULFATE AND AMPHETAMINE SULFATE 5; 5; 5; 5 MG/1; MG/1; MG/1; MG/1
20 TABLET ORAL 2 TIMES DAILY
Qty: 60 TABLET | Refills: 0 | Status: SHIPPED | OUTPATIENT
Start: 2017-10-29 | End: 2017-11-28

## 2017-09-29 NOTE — PROGRESS NOTES
Patient presents with:  ADHD: 3 month f/u       HPI:   This is a 46year old female coming in for chric fatigue, getting out of house 2x weekly now, better fous but still fatigued. Essentially in remission since 3/2017.    HPI     She  reports that she ha distress. HENT:   Head: Normocephalic. Neck: Normal range of motion. Cardiovascular: Normal rate, regular rhythm and normal heart sounds. Pulmonary/Chest: Effort normal and breath sounds normal. No respiratory distress. Abdominal: Soft.  Normal a Current Assessment & Plan     Stable, continue meds. Continue present management.             Other Visit Diagnoses     Laboratory examination ordered as part of a routine general medical examination        Relevant Orders    COMP METABOLIC PANEL (14)    LI

## 2017-09-30 NOTE — ASSESSMENT & PLAN NOTE
Stable, Continue present management.     Thyroid  (most recent labs)     Lab Results  Component Value Date/Time   TSH 0.112 (L) 06/21/2017 08:45 AM   T4F 1.4 06/21/2017 08:45 AM         Endocrine Medications          Levothyroxine Sodium 100 MCG Oral Tab

## 2017-10-19 ENCOUNTER — PATIENT MESSAGE (OUTPATIENT)
Dept: FAMILY MEDICINE CLINIC | Facility: CLINIC | Age: 52
End: 2017-10-19

## 2017-10-23 ENCOUNTER — PATIENT MESSAGE (OUTPATIENT)
Dept: FAMILY MEDICINE CLINIC | Facility: CLINIC | Age: 52
End: 2017-10-23

## 2017-10-31 ENCOUNTER — PATIENT OUTREACH (OUTPATIENT)
Dept: CASE MANAGEMENT | Age: 52
End: 2017-10-31

## 2017-10-31 NOTE — PROGRESS NOTES
Coordination of education, review of chart, update to pt record, compilation and mailing resources/materials: Flu education, Why get the flu vaccine, and request to get flu vaccine with PCP and or [facility] locations.   Time: 5 min    Total monthly time 5 minutes

## 2017-11-01 ENCOUNTER — PATIENT MESSAGE (OUTPATIENT)
Dept: FAMILY MEDICINE CLINIC | Facility: CLINIC | Age: 52
End: 2017-11-01

## 2017-11-13 RX ORDER — LEVOTHYROXINE SODIUM 0.1 MG/1
TABLET ORAL
Qty: 90 TABLET | Refills: 1 | Status: SHIPPED | OUTPATIENT
Start: 2017-11-13 | End: 2018-05-20

## 2017-11-28 ENCOUNTER — OFFICE VISIT (OUTPATIENT)
Dept: FAMILY MEDICINE CLINIC | Facility: CLINIC | Age: 52
End: 2017-11-28

## 2017-11-28 VITALS
SYSTOLIC BLOOD PRESSURE: 112 MMHG | DIASTOLIC BLOOD PRESSURE: 80 MMHG | TEMPERATURE: 98 F | HEART RATE: 72 BPM | RESPIRATION RATE: 14 BRPM

## 2017-11-28 DIAGNOSIS — N30.91 CYSTITIS WITH HEMATURIA: Primary | ICD-10-CM

## 2017-11-28 PROCEDURE — 81003 URINALYSIS AUTO W/O SCOPE: CPT | Performed by: FAMILY MEDICINE

## 2017-11-28 PROCEDURE — 87086 URINE CULTURE/COLONY COUNT: CPT | Performed by: FAMILY MEDICINE

## 2017-11-28 PROCEDURE — 99213 OFFICE O/P EST LOW 20 MIN: CPT | Performed by: FAMILY MEDICINE

## 2017-11-28 RX ORDER — NITROFURANTOIN MACROCRYSTALS 100 MG/1
100 CAPSULE ORAL 2 TIMES DAILY
Qty: 10 CAPSULE | Refills: 0 | Status: SHIPPED | OUTPATIENT
Start: 2017-11-28 | End: 2017-12-03

## 2017-12-12 ENCOUNTER — PATIENT OUTREACH (OUTPATIENT)
Dept: CASE MANAGEMENT | Age: 52
End: 2017-12-12

## 2017-12-12 NOTE — PROGRESS NOTES
.Contacting patient to introduce my self as their new Chronic care manager.  [facility]      [phone]

## 2017-12-20 ENCOUNTER — TELEPHONE (OUTPATIENT)
Dept: FAMILY MEDICINE CLINIC | Facility: CLINIC | Age: 52
End: 2017-12-20

## 2017-12-20 NOTE — TELEPHONE ENCOUNTER
Pt NO SHOWED her appt for today w/Dr Sajan Rausch . Brendenmagaly Mcdermott Brendenmagaly Mcdermott Called 15 min prior to cancel (informed it would be a NO show w/NO. ..  Charge 1st time)

## 2018-01-10 ENCOUNTER — OFFICE VISIT (OUTPATIENT)
Dept: FAMILY MEDICINE CLINIC | Facility: CLINIC | Age: 53
End: 2018-01-10

## 2018-01-10 VITALS
DIASTOLIC BLOOD PRESSURE: 70 MMHG | HEART RATE: 104 BPM | SYSTOLIC BLOOD PRESSURE: 100 MMHG | RESPIRATION RATE: 14 BRPM | TEMPERATURE: 98 F

## 2018-01-10 DIAGNOSIS — G47.10 UNCONTROLLED HYPERSOMNIA: Primary | Chronic | ICD-10-CM

## 2018-01-10 DIAGNOSIS — F33.0 MILD RECURRENT MAJOR DEPRESSION (HCC): Chronic | ICD-10-CM

## 2018-01-10 DIAGNOSIS — N39.0 RECURRENT UTI: ICD-10-CM

## 2018-01-10 DIAGNOSIS — E03.9 ACQUIRED HYPOTHYROIDISM: Chronic | ICD-10-CM

## 2018-01-10 DIAGNOSIS — G89.4 CHRONIC PAIN SYNDROME: ICD-10-CM

## 2018-01-10 DIAGNOSIS — E06.3 CHRONIC LYMPHOCYTIC THYROIDITIS: Chronic | ICD-10-CM

## 2018-01-10 DIAGNOSIS — D63.8 ANEMIA OF CHRONIC DISEASE: ICD-10-CM

## 2018-01-10 DIAGNOSIS — D80.1 HYPOGAMMAGLOBULINEMIA, ACQUIRED (HCC): ICD-10-CM

## 2018-01-10 PROCEDURE — 99214 OFFICE O/P EST MOD 30 MIN: CPT | Performed by: FAMILY MEDICINE

## 2018-01-10 RX ORDER — DEXTROAMPHETAMINE SACCHARATE, AMPHETAMINE ASPARTATE, DEXTROAMPHETAMINE SULFATE AND AMPHETAMINE SULFATE 5; 5; 5; 5 MG/1; MG/1; MG/1; MG/1
20 TABLET ORAL 2 TIMES DAILY
Qty: 60 TABLET | Refills: 0 | Status: SHIPPED | OUTPATIENT
Start: 2018-03-11 | End: 2018-03-28

## 2018-01-10 RX ORDER — DEXTROAMPHETAMINE SACCHARATE, AMPHETAMINE ASPARTATE, DEXTROAMPHETAMINE SULFATE AND AMPHETAMINE SULFATE 5; 5; 5; 5 MG/1; MG/1; MG/1; MG/1
20 TABLET ORAL 2 TIMES DAILY
Qty: 60 TABLET | Refills: 0 | Status: SHIPPED | OUTPATIENT
Start: 2018-02-09 | End: 2018-03-28

## 2018-01-10 RX ORDER — CYANOCOBALAMIN 1000 UG/ML
INJECTION INTRAMUSCULAR; SUBCUTANEOUS DAILY
COMMUNITY
End: 2019-12-02 | Stop reason: ALTCHOICE

## 2018-01-10 RX ORDER — DEXTROAMPHETAMINE SACCHARATE, AMPHETAMINE ASPARTATE, DEXTROAMPHETAMINE SULFATE AND AMPHETAMINE SULFATE 5; 5; 5; 5 MG/1; MG/1; MG/1; MG/1
20 TABLET ORAL 2 TIMES DAILY
Qty: 60 TABLET | Refills: 0 | Status: SHIPPED | OUTPATIENT
Start: 2018-01-10 | End: 2018-03-28

## 2018-01-10 RX ORDER — ESTRADIOL 0.1 MG/G
1 CREAM VAGINAL
Qty: 42.5 G | Refills: 0 | Status: SHIPPED | OUTPATIENT
Start: 2018-01-11 | End: 2018-07-05

## 2018-01-10 NOTE — PROGRESS NOTES
Patient presents with:  ADHD  Fatigue      HPI:   This is a 45year old female coming in for thryodiitis, using stimulants for help with severe fatigue from this condition. New supplement of B3 seems to help. HPI B12 inhections helping as well.      She Constitutional: She is oriented to person, place, and time. She appears well-developed and well-nourished. No distress. HENT:   Head: Normocephalic. Right Ear: Hearing, tympanic membrane, external ear and ear canal normal. No middle ear effusion.    L overall doing better, [facility] article written 112/13/2017         Relevant Medications    cyanocobalamin 1000 MCG/ML Injection Solution    Niacinamide-Zinc-Copper-FA (TL NICOTINAMIDE OR)    amphetamine-dextroamphetamine (ADDERALL) 20 MG Oral Tab

## 2018-01-11 NOTE — ASSESSMENT & PLAN NOTE
Stable on meds, overall doing better, [facility] article written 112/13/2017

## 2018-01-12 ENCOUNTER — PATIENT OUTREACH (OUTPATIENT)
Dept: CASE MANAGEMENT | Age: 53
End: 2018-01-12

## 2018-01-12 NOTE — PROGRESS NOTES
Contacting patient to introduce my self as their new Chronic care manager.  [facility]      [phone]

## 2018-03-28 ENCOUNTER — OFFICE VISIT (OUTPATIENT)
Dept: FAMILY MEDICINE CLINIC | Facility: CLINIC | Age: 53
End: 2018-03-28

## 2018-03-28 VITALS
SYSTOLIC BLOOD PRESSURE: 132 MMHG | HEART RATE: 80 BPM | TEMPERATURE: 98 F | RESPIRATION RATE: 14 BRPM | DIASTOLIC BLOOD PRESSURE: 82 MMHG

## 2018-03-28 DIAGNOSIS — G89.4 CHRONIC PAIN SYNDROME: ICD-10-CM

## 2018-03-28 DIAGNOSIS — F33.0 MILD RECURRENT MAJOR DEPRESSION (HCC): ICD-10-CM

## 2018-03-28 DIAGNOSIS — Z13.0 SCREENING FOR IRON DEFICIENCY ANEMIA: ICD-10-CM

## 2018-03-28 DIAGNOSIS — Z72.89 OTHER PROBLEMS RELATED TO LIFESTYLE: ICD-10-CM

## 2018-03-28 DIAGNOSIS — G47.10 UNCONTROLLED HYPERSOMNIA: Primary | ICD-10-CM

## 2018-03-28 DIAGNOSIS — Z00.00 LABORATORY EXAMINATION ORDERED AS PART OF A ROUTINE GENERAL MEDICAL EXAMINATION: ICD-10-CM

## 2018-03-28 DIAGNOSIS — Z12.31 VISIT FOR SCREENING MAMMOGRAM: ICD-10-CM

## 2018-03-28 DIAGNOSIS — Z13.220 SCREENING FOR LIPID DISORDERS: ICD-10-CM

## 2018-03-28 DIAGNOSIS — Z13.29 SCREENING FOR THYROID DISORDER: ICD-10-CM

## 2018-03-28 DIAGNOSIS — E03.9 ACQUIRED HYPOTHYROIDISM: ICD-10-CM

## 2018-03-28 DIAGNOSIS — E61.1 IRON DEFICIENCY: ICD-10-CM

## 2018-03-28 DIAGNOSIS — Z11.59 NEED FOR HEPATITIS C SCREENING TEST: ICD-10-CM

## 2018-03-28 PROCEDURE — 99214 OFFICE O/P EST MOD 30 MIN: CPT | Performed by: FAMILY MEDICINE

## 2018-03-28 RX ORDER — DEXTROAMPHETAMINE SACCHARATE, AMPHETAMINE ASPARTATE, DEXTROAMPHETAMINE SULFATE AND AMPHETAMINE SULFATE 5; 5; 5; 5 MG/1; MG/1; MG/1; MG/1
20 TABLET ORAL 2 TIMES DAILY
Qty: 60 TABLET | Refills: 0 | Status: SHIPPED | OUTPATIENT
Start: 2018-04-17 | End: 2018-05-17 | Stop reason: WASHOUT

## 2018-03-28 RX ORDER — DEXTROAMPHETAMINE SACCHARATE, AMPHETAMINE ASPARTATE, DEXTROAMPHETAMINE SULFATE AND AMPHETAMINE SULFATE 5; 5; 5; 5 MG/1; MG/1; MG/1; MG/1
20 TABLET ORAL 2 TIMES DAILY
Qty: 60 TABLET | Refills: 0 | Status: SHIPPED | OUTPATIENT
Start: 2018-05-17 | End: 2018-07-05

## 2018-03-28 RX ORDER — DEXTROAMPHETAMINE SACCHARATE, AMPHETAMINE ASPARTATE, DEXTROAMPHETAMINE SULFATE AND AMPHETAMINE SULFATE 5; 5; 5; 5 MG/1; MG/1; MG/1; MG/1
20 TABLET ORAL 2 TIMES DAILY
Qty: 60 TABLET | Refills: 0 | Status: SHIPPED | OUTPATIENT
Start: 2018-06-16 | End: 2018-07-05

## 2018-05-20 DIAGNOSIS — F33.0 MILD RECURRENT MAJOR DEPRESSION (HCC): Chronic | ICD-10-CM

## 2018-05-22 RX ORDER — LEVOTHYROXINE SODIUM 0.1 MG/1
TABLET ORAL
Qty: 90 TABLET | Refills: 0 | Status: SHIPPED | OUTPATIENT
Start: 2018-05-22 | End: 2018-08-22

## 2018-05-22 RX ORDER — VENLAFAXINE HYDROCHLORIDE 150 MG/1
CAPSULE, EXTENDED RELEASE ORAL
Qty: 180 CAPSULE | Refills: 0 | Status: SHIPPED | OUTPATIENT
Start: 2018-05-22 | End: 2018-08-21

## 2018-05-22 NOTE — TELEPHONE ENCOUNTER
Refill request sent form pharmacy for Levothyroxine and Venlafaxine. LOV 03/28/18. Apsalar message sent today to Pt about getting labs. Will you approve refills? Routed to Dr Tess Reid.

## 2018-07-05 ENCOUNTER — OFFICE VISIT (OUTPATIENT)
Dept: FAMILY MEDICINE CLINIC | Facility: CLINIC | Age: 53
End: 2018-07-05

## 2018-07-05 VITALS — DIASTOLIC BLOOD PRESSURE: 70 MMHG | SYSTOLIC BLOOD PRESSURE: 110 MMHG | TEMPERATURE: 99 F | HEART RATE: 108 BPM

## 2018-07-05 DIAGNOSIS — G47.10 HYPERSOMNIA: Primary | ICD-10-CM

## 2018-07-05 PROCEDURE — 99213 OFFICE O/P EST LOW 20 MIN: CPT | Performed by: FAMILY MEDICINE

## 2018-07-05 RX ORDER — DEXTROAMPHETAMINE SACCHARATE, AMPHETAMINE ASPARTATE, DEXTROAMPHETAMINE SULFATE AND AMPHETAMINE SULFATE 5; 5; 5; 5 MG/1; MG/1; MG/1; MG/1
20 TABLET ORAL 2 TIMES DAILY
Qty: 60 TABLET | Refills: 0 | Status: SHIPPED | OUTPATIENT
Start: 2018-09-03 | End: 2018-10-17

## 2018-07-05 RX ORDER — DEXTROAMPHETAMINE SACCHARATE, AMPHETAMINE ASPARTATE, DEXTROAMPHETAMINE SULFATE AND AMPHETAMINE SULFATE 5; 5; 5; 5 MG/1; MG/1; MG/1; MG/1
20 TABLET ORAL 2 TIMES DAILY
Qty: 60 TABLET | Refills: 0 | Status: SHIPPED | OUTPATIENT
Start: 2018-07-05 | End: 2018-10-17

## 2018-07-05 RX ORDER — DEXTROAMPHETAMINE SACCHARATE, AMPHETAMINE ASPARTATE, DEXTROAMPHETAMINE SULFATE AND AMPHETAMINE SULFATE 5; 5; 5; 5 MG/1; MG/1; MG/1; MG/1
20 TABLET ORAL 2 TIMES DAILY
Qty: 60 TABLET | Refills: 0 | Status: SHIPPED | OUTPATIENT
Start: 2018-08-04 | End: 2018-10-17

## 2018-07-05 NOTE — PROGRESS NOTES
Alex Mosher is a 45year old female. HPI:   Patient presents for a medication follow up. Pt reports rare form of encephalitis - pt has been in remission for 1.5 years.   She has been using Adderall for the last several years for fatigue and hy this encounter.     Meds & Refills for this Visit:  No prescriptions requested or ordered in this encounter  Imaging & Consults:  None

## 2018-08-17 ENCOUNTER — PATIENT MESSAGE (OUTPATIENT)
Dept: FAMILY MEDICINE CLINIC | Facility: CLINIC | Age: 53
End: 2018-08-17

## 2018-08-21 DIAGNOSIS — F33.0 MILD RECURRENT MAJOR DEPRESSION (HCC): Chronic | ICD-10-CM

## 2018-08-22 RX ORDER — LEVOTHYROXINE SODIUM 0.1 MG/1
TABLET ORAL
Qty: 90 TABLET | Refills: 1 | Status: SHIPPED | OUTPATIENT
Start: 2018-08-22 | End: 2019-02-27

## 2018-08-22 RX ORDER — VENLAFAXINE HYDROCHLORIDE 150 MG/1
CAPSULE, EXTENDED RELEASE ORAL
Qty: 180 CAPSULE | Refills: 1 | Status: SHIPPED | OUTPATIENT
Start: 2018-08-22 | End: 2019-02-18

## 2018-08-23 RX ORDER — TRAZODONE HYDROCHLORIDE 100 MG/1
300 TABLET ORAL NIGHTLY
Qty: 270 TABLET | Refills: 1 | Status: SHIPPED | OUTPATIENT
Start: 2018-08-23 | End: 2019-02-27

## 2018-08-23 NOTE — TELEPHONE ENCOUNTER
Refill request from [facility] for refill on Trazadone 100mg  LOV 7/5/18  Will you authorize refill?

## 2018-10-17 NOTE — PROGRESS NOTES
Danica Ty is a 52year old female. Patient presents with:  ADHD: 3 month refill   Complete Form: Placard appilcation       Subjective    HPI:   Patient presents for recheck of her hypersomnia due to medical condition.   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  Continue present [employer]

## 2018-10-21 ENCOUNTER — PATIENT MESSAGE (OUTPATIENT)
Dept: FAMILY MEDICINE CLINIC | Facility: CLINIC | Age: 53
End: 2018-10-21

## 2018-10-22 NOTE — TELEPHONE ENCOUNTER
From: Sabino Mcgill  To: Tyesha Hodges MD  Sent: 10/21/2018 2:25 PM CDT  Subject: Other    Hello Dr. Amanda Hall,  On Saturday, I went to the dermatologist. I had an issue with my skin. The doctor told me I had an autoimmune response that was affecting my face. It had been like that for 7 weeks. I'm concerned this may trigger an response with my autoimmune encephalitis. Is there any precautions that should be taken? Thank you.   Sabino Mcgill 10/05/1965

## 2018-10-22 NOTE — TELEPHONE ENCOUNTER
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 When I researched PubMed I saw the use of steroids for this condition to prevent relapse. When I see the dermatologist on Thursday, I will inquire. I'm still concerned it will trigger a response.

## 2018-11-05 ENCOUNTER — TELEPHONE (OUTPATIENT)
Dept: FAMILY MEDICINE CLINIC | Facility: CLINIC | Age: 53
End: 2018-11-05

## 2018-11-05 DIAGNOSIS — N30.00 ACUTE CYSTITIS WITHOUT HEMATURIA: Primary | ICD-10-CM

## 2018-11-05 RX ORDER — SULFAMETHOXAZOLE AND TRIMETHOPRIM 800; 160 MG/1; MG/1
1 TABLET ORAL 2 TIMES DAILY
Qty: 14 TABLET | Refills: 0 | Status: SHIPPED | OUTPATIENT
Start: 2018-11-05 | End: 2018-11-12

## 2018-11-05 NOTE — TELEPHONE ENCOUNTER
Feels UTI started a week ago but was taking cranberry and also had positive nitrates on home UTI test but no leukocytes currently at home in bed

## 2018-11-05 NOTE — TELEPHONE ENCOUNTER
TC from patient who states she has a long existing serious illness and can't come in. She said she did a home test for a UTI and showed nitrates positive. She has recently been treated with Solumedrol per patient, states too sick to come in.   Please call b

## 2018-11-05 NOTE — TELEPHONE ENCOUNTER
Diagnoses and all orders for this visit:    Acute cystitis without hematuria  -     Sulfamethoxazole-TMP -160 MG Oral Tab per tablet; Take 1 tablet by mouth 2 (two) times daily for 7 days. OK to notify.  Thanks, Sharyle Field, MD

## 2018-11-12 ENCOUNTER — TELEPHONE (OUTPATIENT)
Dept: FAMILY MEDICINE CLINIC | Facility: CLINIC | Age: 53
End: 2018-11-12

## 2018-11-12 ENCOUNTER — OFFICE VISIT (OUTPATIENT)
Dept: FAMILY MEDICINE CLINIC | Facility: CLINIC | Age: 53
End: 2018-11-12
Payer: MEDICARE

## 2018-11-12 VITALS
TEMPERATURE: 99 F | BODY MASS INDEX: 20 KG/M2 | SYSTOLIC BLOOD PRESSURE: 138 MMHG | DIASTOLIC BLOOD PRESSURE: 80 MMHG | HEART RATE: 112 BPM | WEIGHT: 110 LBS | RESPIRATION RATE: 16 BRPM

## 2018-11-12 DIAGNOSIS — R10.9 FLANK PAIN, ACUTE: ICD-10-CM

## 2018-11-12 DIAGNOSIS — R30.0 DYSURIA: Primary | ICD-10-CM

## 2018-11-12 DIAGNOSIS — R30.9 PAINFUL URINATION: ICD-10-CM

## 2018-11-12 PROCEDURE — 87086 URINE CULTURE/COLONY COUNT: CPT | Performed by: PHYSICIAN ASSISTANT

## 2018-11-12 PROCEDURE — 81003 URINALYSIS AUTO W/O SCOPE: CPT | Performed by: PHYSICIAN ASSISTANT

## 2018-11-12 PROCEDURE — 99213 OFFICE O/P EST LOW 20 MIN: CPT | Performed by: PHYSICIAN ASSISTANT

## 2018-11-12 RX ORDER — HYDROCODONE BITARTRATE AND ACETAMINOPHEN 5; 325 MG/1; MG/1
1 TABLET ORAL EVERY 6 HOURS PRN
Qty: 10 TABLET | Refills: 0 | Status: SHIPPED | OUTPATIENT
Start: 2018-11-12 | End: 2019-01-14 | Stop reason: ALTCHOICE

## 2018-11-12 NOTE — TELEPHONE ENCOUNTER
Still has pain in bladder area taking bactrim and tylenol and motrin for pain without relief given an appointment for today at 12:00 with Chas Rodriguez PA-c

## 2018-11-12 NOTE — PROGRESS NOTES
CC: UTI x 3 weeks    HISTORY OF PRESENT ILLNESS  Arnie Yusuf is a 52year old female who presents for evaluation of possible UTI. She has a three week history of dysuria and suprapubic abdominal pain.  She does have a urine testing kit at home that jina amphetamine-dextroamphetamine (ADDERALL) 20 MG Oral Tab, Take 1 tablet (20 mg total) by mouth 2 (two) times daily. , Disp: 60 tablet, Rfl: 0  •  amphetamine-dextroamphetamine (ADDERALL) 20 MG Oral Tab, Take 1 tablet (20 mg total) by mouth 2 (two) times jael 11/12/2018 7.0  4.5 - 8.0 Final   • PROTEIN (URINE DIPSTICK) 11/12/2018 30mg/dL* Negative/Trace mg/dL Final   • UROBILINOGEN,SEMI-QN 11/12/2018 2.0E.U./dL* 0.0 - 1.9 mg/dL Final   • NITRITE, URINE 11/12/2018 positive* Negative Final   • LEUKOCYTES 11/12/20

## 2019-01-04 ENCOUNTER — LAB ENCOUNTER (OUTPATIENT)
Dept: LAB | Facility: HOSPITAL | Age: 54
End: 2019-01-04
Attending: FAMILY MEDICINE
Payer: MEDICARE

## 2019-01-04 DIAGNOSIS — Z72.89 OTHER PROBLEMS RELATED TO LIFESTYLE: ICD-10-CM

## 2019-01-04 DIAGNOSIS — Z13.220 SCREENING FOR LIPID DISORDERS: ICD-10-CM

## 2019-01-04 DIAGNOSIS — Z13.0 SCREENING FOR IRON DEFICIENCY ANEMIA: ICD-10-CM

## 2019-01-04 DIAGNOSIS — Z00.00 LABORATORY EXAMINATION ORDERED AS PART OF A ROUTINE GENERAL MEDICAL EXAMINATION: ICD-10-CM

## 2019-01-04 DIAGNOSIS — Z13.29 SCREENING FOR THYROID DISORDER: ICD-10-CM

## 2019-01-04 DIAGNOSIS — E61.1 IRON DEFICIENCY: ICD-10-CM

## 2019-01-04 DIAGNOSIS — Z11.59 NEED FOR HEPATITIS C SCREENING TEST: ICD-10-CM

## 2019-01-04 LAB
ALBUMIN SERPL-MCNC: 3.8 G/DL (ref 3.1–4.5)
ALBUMIN/GLOB SERPL: 1.1 {RATIO} (ref 1–2)
ALP LIVER SERPL-CCNC: 64 U/L (ref 41–108)
ALT SERPL-CCNC: 20 U/L (ref 14–54)
ANION GAP SERPL CALC-SCNC: 7 MMOL/L (ref 0–18)
AST SERPL-CCNC: 15 U/L (ref 15–41)
BASOPHILS # BLD AUTO: 0.06 X10(3) UL (ref 0–0.1)
BASOPHILS NFR BLD AUTO: 1.5 %
BILIRUB SERPL-MCNC: 0.4 MG/DL (ref 0.1–2)
BUN BLD-MCNC: 12 MG/DL (ref 8–20)
BUN/CREAT SERPL: 11.8 (ref 10–20)
CALCIUM BLD-MCNC: 9.2 MG/DL (ref 8.3–10.3)
CHLORIDE SERPL-SCNC: 105 MMOL/L (ref 101–111)
CHOLEST SMN-MCNC: 224 MG/DL (ref ?–200)
CO2 SERPL-SCNC: 28 MMOL/L (ref 22–32)
CREAT BLD-MCNC: 1.02 MG/DL (ref 0.55–1.02)
DEPRECATED HBV CORE AB SER IA-ACNC: 35.7 NG/ML (ref 18–340)
EOSINOPHIL # BLD AUTO: 0.11 X10(3) UL (ref 0–0.3)
EOSINOPHIL NFR BLD AUTO: 2.8 %
ERYTHROCYTE [DISTWIDTH] IN BLOOD BY AUTOMATED COUNT: 11.9 % (ref 11.5–16)
GLOBULIN PLAS-MCNC: 3.4 G/DL (ref 2.8–4.4)
GLUCOSE BLD-MCNC: 89 MG/DL (ref 70–99)
HCT VFR BLD AUTO: 44.8 % (ref 34–50)
HCV AB SERPL QL IA: NONREACTIVE
HDLC SERPL-MCNC: 91 MG/DL (ref 40–59)
HGB BLD-MCNC: 14.8 G/DL (ref 12–16)
IMMATURE GRANULOCYTE COUNT: 0.01 X10(3) UL (ref 0–1)
IMMATURE GRANULOCYTE RATIO %: 0.3 %
IRON SATURATION: 42 % (ref 15–50)
IRON: 167 UG/DL (ref 28–170)
LDLC SERPL CALC-MCNC: 120 MG/DL (ref ?–100)
LYMPHOCYTES # BLD AUTO: 0.98 X10(3) UL (ref 0.9–4)
LYMPHOCYTES NFR BLD AUTO: 25.3 %
M PROTEIN MFR SERPL ELPH: 7.2 G/DL (ref 6.4–8.2)
MCH RBC QN AUTO: 30.8 PG (ref 27–33.2)
MCHC RBC AUTO-ENTMCNC: 33 G/DL (ref 31–37)
MCV RBC AUTO: 93.1 FL (ref 81–100)
MONOCYTES # BLD AUTO: 0.34 X10(3) UL (ref 0.1–1)
MONOCYTES NFR BLD AUTO: 8.8 %
NEUTROPHIL ABS PRELIM: 2.38 X10 (3) UL (ref 1.3–6.7)
NEUTROPHILS # BLD AUTO: 2.38 X10(3) UL (ref 1.3–6.7)
NEUTROPHILS NFR BLD AUTO: 61.3 %
NONHDLC SERPL-MCNC: 133 MG/DL (ref ?–130)
OSMOLALITY SERPL CALC.SUM OF ELEC: 289 MOSM/KG (ref 275–295)
PLATELET # BLD AUTO: 235 10(3)UL (ref 150–450)
POTASSIUM SERPL-SCNC: 4.6 MMOL/L (ref 3.6–5.1)
RBC # BLD AUTO: 4.81 X10(6)UL (ref 3.8–5.1)
RED CELL DISTRIBUTION WIDTH-SD: 41.2 FL (ref 35.1–46.3)
SODIUM SERPL-SCNC: 140 MMOL/L (ref 136–144)
T4 FREE SERPL-MCNC: 1.1 NG/DL (ref 0.9–1.8)
TOTAL IRON BINDING CAPACITY: 402 UG/DL (ref 240–450)
TRANSFERRIN SERPL-MCNC: 270 MG/DL (ref 200–360)
TRIGL SERPL-MCNC: 67 MG/DL (ref 30–149)
TSI SER-ACNC: 0.07 MIU/ML (ref 0.35–5.5)
VLDLC SERPL CALC-MCNC: 13 MG/DL (ref 0–30)
WBC # BLD AUTO: 3.9 X10(3) UL (ref 4–13)

## 2019-01-04 PROCEDURE — 80061 LIPID PANEL: CPT

## 2019-01-04 PROCEDURE — 86803 HEPATITIS C AB TEST: CPT

## 2019-01-04 PROCEDURE — 36415 COLL VENOUS BLD VENIPUNCTURE: CPT

## 2019-01-04 PROCEDURE — 83540 ASSAY OF IRON: CPT

## 2019-01-04 PROCEDURE — 84443 ASSAY THYROID STIM HORMONE: CPT

## 2019-01-04 PROCEDURE — 80053 COMPREHEN METABOLIC PANEL: CPT

## 2019-01-04 PROCEDURE — 85025 COMPLETE CBC W/AUTO DIFF WBC: CPT

## 2019-01-04 PROCEDURE — 84439 ASSAY OF FREE THYROXINE: CPT

## 2019-01-04 PROCEDURE — 82728 ASSAY OF FERRITIN: CPT

## 2019-01-04 PROCEDURE — 83550 IRON BINDING TEST: CPT

## 2019-01-14 ENCOUNTER — HOSPITAL ENCOUNTER (EMERGENCY)
Facility: HOSPITAL | Age: 54
Discharge: HOME | End: 2019-01-14
Attending: EMERGENCY MEDICINE
Payer: MEDICARE

## 2019-01-14 ENCOUNTER — TELEPHONE (OUTPATIENT)
Dept: FAMILY MEDICINE CLINIC | Facility: CLINIC | Age: 54
End: 2019-01-14

## 2019-01-14 VITALS
BODY MASS INDEX: 20.8 KG/M2 | HEART RATE: 107 BPM | SYSTOLIC BLOOD PRESSURE: 125 MMHG | TEMPERATURE: 98 F | HEIGHT: 62 IN | RESPIRATION RATE: 16 BRPM | DIASTOLIC BLOOD PRESSURE: 90 MMHG | OXYGEN SATURATION: 98 % | WEIGHT: 113 LBS

## 2019-01-14 DIAGNOSIS — S39.012A LUMBAR STRAIN, INITIAL ENCOUNTER: Primary | ICD-10-CM

## 2019-01-14 PROCEDURE — 96375 TX/PRO/DX INJ NEW DRUG ADDON: CPT

## 2019-01-14 PROCEDURE — 99284 EMERGENCY DEPT VISIT MOD MDM: CPT

## 2019-01-14 PROCEDURE — 96374 THER/PROPH/DIAG INJ IV PUSH: CPT

## 2019-01-14 PROCEDURE — 99285 EMERGENCY DEPT VISIT HI MDM: CPT

## 2019-01-14 PROCEDURE — 96376 TX/PRO/DX INJ SAME DRUG ADON: CPT

## 2019-01-14 RX ORDER — HYDROCODONE BITARTRATE AND ACETAMINOPHEN 5; 325 MG/1; MG/1
1-2 TABLET ORAL EVERY 4 HOURS PRN
Qty: 12 TABLET | Refills: 0 | Status: SHIPPED | OUTPATIENT
Start: 2019-01-14 | End: 2019-03-12

## 2019-01-14 RX ORDER — DEXAMETHASONE SODIUM PHOSPHATE 4 MG/ML
10 VIAL (ML) INJECTION ONCE
Status: COMPLETED | OUTPATIENT
Start: 2019-01-14 | End: 2019-01-14

## 2019-01-14 RX ORDER — CYCLOBENZAPRINE HCL 10 MG
10 TABLET ORAL 3 TIMES DAILY PRN
Qty: 15 TABLET | Refills: 0 | Status: SHIPPED | OUTPATIENT
Start: 2019-01-14 | End: 2019-03-12

## 2019-01-14 RX ORDER — DIAZEPAM 5 MG/ML
5 INJECTION, SOLUTION INTRAMUSCULAR; INTRAVENOUS ONCE
Status: COMPLETED | OUTPATIENT
Start: 2019-01-14 | End: 2019-01-14

## 2019-01-14 RX ORDER — KETOROLAC TROMETHAMINE 30 MG/ML
30 INJECTION, SOLUTION INTRAMUSCULAR; INTRAVENOUS ONCE
Status: COMPLETED | OUTPATIENT
Start: 2019-01-14 | End: 2019-01-14

## 2019-01-14 RX ORDER — MORPHINE SULFATE 4 MG/ML
4 INJECTION, SOLUTION INTRAMUSCULAR; INTRAVENOUS EVERY 30 MIN PRN
Status: DISCONTINUED | OUTPATIENT
Start: 2019-01-14 | End: 2019-01-15

## 2019-01-14 RX ORDER — METHYLPREDNISOLONE 4 MG/1
TABLET ORAL
Qty: 1 PACKAGE | Refills: 0 | Status: SHIPPED | OUTPATIENT
Start: 2019-01-14 | End: 2019-01-19

## 2019-01-14 NOTE — TELEPHONE ENCOUNTER
Should be seen by one of us or at [address] [place] [address].  She can try heat and ibuprofen 600 TID in mean time

## 2019-01-14 NOTE — TELEPHONE ENCOUNTER
Called and talked to patient she will go to [facility] or ED to be seen for this pain

## 2019-01-14 NOTE — TELEPHONE ENCOUNTER
Called patient and she said she made a movement and her back pain started usually she goes to the ED to get a shot but cannot get out of bed now I will ask Dr Walter Goldstein what he wants to to do

## 2019-01-15 NOTE — ED PROVIDER NOTES
Patient Seen in: [facility] Emergency Department    History   Patient presents with:  Back Pain (musculoskeletal)    Stated Complaint: Bending over low back pain started, denies radiation of pain or N/T or incontin*    HPI    33-year-old female pres reviewed and negative except as noted above.     Physical Exam     ED Triage Vitals [01/14/19 1908]   /85   Pulse 101   Resp 18   Temp 97.7 °F (36.5 °C)   Temp src Temporal   SpO2 100 %   O2 Device None (Room air)       Current:/78   Pulse 92 appointment as soon as possible for a visit      Eugenia Donato MD  [address] [facility] [place] [address] [phone]      spine specialist, As needed        Medications Prescribed:  Current Discharge Medication List    START taking these medic

## 2019-01-15 NOTE — ED INITIAL ASSESSMENT (HPI)
Pt here for back pain  Per pt, \"I threw my back out last night. I have a rare brain disease so I'm in bed all the time. All I did was bent over to get something off my bed and it just snapped.  This happens once every 10 years, but my brain disease makes t

## 2019-01-28 ENCOUNTER — TELEPHONE (OUTPATIENT)
Dept: FAMILY MEDICINE CLINIC | Facility: CLINIC | Age: 54
End: 2019-01-28

## 2019-01-28 ENCOUNTER — PATIENT MESSAGE (OUTPATIENT)
Dept: FAMILY MEDICINE CLINIC | Facility: CLINIC | Age: 54
End: 2019-01-28

## 2019-01-28 RX ORDER — DEXTROAMPHETAMINE SACCHARATE, AMPHETAMINE ASPARTATE, DEXTROAMPHETAMINE SULFATE AND AMPHETAMINE SULFATE 5; 5; 5; 5 MG/1; MG/1; MG/1; MG/1
20 TABLET ORAL 2 TIMES DAILY
Qty: 60 TABLET | Refills: 0 | Status: SHIPPED | OUTPATIENT
Start: 2019-01-28 | End: 2019-02-27

## 2019-01-28 NOTE — TELEPHONE ENCOUNTER
LM for pt need to reschedule apptment w/Dr. Martin Art  DOS 1/30/19 - due to weather the office will be closed.

## 2019-01-28 NOTE — TELEPHONE ENCOUNTER
From: Baljeet Arzate  To: Iron Galindo MD  Sent: 1/28/2019 5:12 PM CST  Subject: Other    Hello Dr. Stefan Arzola,    I just received notice my appointment on Wednesday (1/30/19) has been cancelled. I have enough Adderall to last me until Wednesday. I assume I will have to do without. I wanted to check with you to see if there is anything to be done about this. I did attempt to make the appointment early enough so this would not happen. Unfortunately, I had to cancel the first time due to a bad cold. I had to cancel again because I threw my back out. Thank you.   Baljeet Arzate

## 2019-02-12 DIAGNOSIS — F33.0 MILD RECURRENT MAJOR DEPRESSION (HCC): Chronic | ICD-10-CM

## 2019-02-12 RX ORDER — VENLAFAXINE HYDROCHLORIDE 150 MG/1
CAPSULE, EXTENDED RELEASE ORAL
Qty: 180 CAPSULE | Refills: 0 | Status: CANCELLED | OUTPATIENT
Start: 2019-02-12

## 2019-02-16 ENCOUNTER — PATIENT MESSAGE (OUTPATIENT)
Dept: FAMILY MEDICINE CLINIC | Facility: CLINIC | Age: 54
End: 2019-02-16

## 2019-02-16 DIAGNOSIS — F33.0 MILD RECURRENT MAJOR DEPRESSION (HCC): Chronic | ICD-10-CM

## 2019-02-18 RX ORDER — VENLAFAXINE HYDROCHLORIDE 150 MG/1
CAPSULE, EXTENDED RELEASE ORAL
Qty: 180 CAPSULE | Refills: 4 | Status: SHIPPED | OUTPATIENT
Start: 2019-02-18 | End: 2020-05-19

## 2019-02-18 NOTE — TELEPHONE ENCOUNTER
From: Bibiana Harden  To: Anyi Cash MD  Sent: 2/16/2019 10:15 AM CST  Subject: Other    Hi Dr. Violeta Brown: I am in urgent need of a prescription for Venlafaxine. I will run out of it today! The pharmacy sent in a request at the beginning of the week.  Alberto

## 2019-02-19 RX ORDER — LEVOTHYROXINE SODIUM 0.1 MG/1
TABLET ORAL
Qty: 90 TABLET | Refills: 0 | OUTPATIENT
Start: 2019-02-19

## 2019-02-27 ENCOUNTER — OFFICE VISIT (OUTPATIENT)
Dept: FAMILY MEDICINE CLINIC | Facility: CLINIC | Age: 54
End: 2019-02-27
Payer: MEDICARE

## 2019-02-27 VITALS
RESPIRATION RATE: 12 BRPM | HEIGHT: 62 IN | DIASTOLIC BLOOD PRESSURE: 74 MMHG | SYSTOLIC BLOOD PRESSURE: 128 MMHG | WEIGHT: 118 LBS | TEMPERATURE: 98 F | BODY MASS INDEX: 21.71 KG/M2 | HEART RATE: 88 BPM

## 2019-02-27 DIAGNOSIS — G89.4 CHRONIC PAIN SYNDROME: ICD-10-CM

## 2019-02-27 DIAGNOSIS — E03.9 ACQUIRED HYPOTHYROIDISM: Chronic | ICD-10-CM

## 2019-02-27 DIAGNOSIS — Z00.00 ENCOUNTER FOR ANNUAL HEALTH EXAMINATION: ICD-10-CM

## 2019-02-27 DIAGNOSIS — F90.0 ADHD (ATTENTION DEFICIT HYPERACTIVITY DISORDER), INATTENTIVE TYPE: Chronic | ICD-10-CM

## 2019-02-27 DIAGNOSIS — Z12.31 ENCOUNTER FOR SCREENING MAMMOGRAM FOR MALIGNANT NEOPLASM OF BREAST: ICD-10-CM

## 2019-02-27 DIAGNOSIS — F33.0 MILD RECURRENT MAJOR DEPRESSION (HCC): ICD-10-CM

## 2019-02-27 DIAGNOSIS — Z00.00 ANNUAL PHYSICAL EXAM: Primary | ICD-10-CM

## 2019-02-27 DIAGNOSIS — G04.81: ICD-10-CM

## 2019-02-27 DIAGNOSIS — Z12.31 VISIT FOR SCREENING MAMMOGRAM: ICD-10-CM

## 2019-02-27 DIAGNOSIS — Z28.21 IMMUNIZATION NOT CARRIED OUT BECAUSE OF PATIENT REFUSAL: ICD-10-CM

## 2019-02-27 PROCEDURE — G0438 PPPS, INITIAL VISIT: HCPCS | Performed by: FAMILY MEDICINE

## 2019-02-27 PROCEDURE — 99213 OFFICE O/P EST LOW 20 MIN: CPT | Performed by: FAMILY MEDICINE

## 2019-02-27 RX ORDER — DEXTROAMPHETAMINE SACCHARATE, AMPHETAMINE ASPARTATE, DEXTROAMPHETAMINE SULFATE AND AMPHETAMINE SULFATE 5; 5; 5; 5 MG/1; MG/1; MG/1; MG/1
20 TABLET ORAL 2 TIMES DAILY
Qty: 60 TABLET | Refills: 0 | Status: SHIPPED | OUTPATIENT
Start: 2019-03-29 | End: 2019-03-12

## 2019-02-27 RX ORDER — TRAZODONE HYDROCHLORIDE 100 MG/1
300 TABLET ORAL NIGHTLY
Qty: 270 TABLET | Refills: 4 | Status: SHIPPED | OUTPATIENT
Start: 2019-02-27 | End: 2019-04-02 | Stop reason: DRUGHIGH

## 2019-02-27 RX ORDER — LEVOTHYROXINE SODIUM 0.1 MG/1
100 TABLET ORAL
Qty: 90 TABLET | Refills: 4 | Status: SHIPPED | OUTPATIENT
Start: 2019-02-27 | End: 2020-04-07

## 2019-02-27 RX ORDER — DEXTROAMPHETAMINE SACCHARATE, AMPHETAMINE ASPARTATE, DEXTROAMPHETAMINE SULFATE AND AMPHETAMINE SULFATE 5; 5; 5; 5 MG/1; MG/1; MG/1; MG/1
20 TABLET ORAL 2 TIMES DAILY
Qty: 60 TABLET | Refills: 0 | Status: SHIPPED | OUTPATIENT
Start: 2019-04-28 | End: 2019-03-12

## 2019-02-27 RX ORDER — DEXTROAMPHETAMINE SACCHARATE, AMPHETAMINE ASPARTATE, DEXTROAMPHETAMINE SULFATE AND AMPHETAMINE SULFATE 5; 5; 5; 5 MG/1; MG/1; MG/1; MG/1
20 TABLET ORAL 2 TIMES DAILY
Qty: 60 TABLET | Refills: 0 | Status: SHIPPED | OUTPATIENT
Start: 2019-02-27 | End: 2019-03-29 | Stop reason: WASHOUT

## 2019-02-27 NOTE — ASSESSMENT & PLAN NOTE
Stable in this dose. Continue present managememt   Stable, Continue present management.     Thyroid  (most recent labs)   Lab Results   Component Value Date/Time    TSH 0.073 (L) 01/04/2019 05:28 PM    T4F 1.1 01/04/2019 05:28 PM         Endocrine Medicatio

## 2019-02-27 NOTE — PROGRESS NOTES
HPI:   Michaele Ganser is a 52year old female who presents for a Medicare Initial Annual Wellness visit (Once after 12 month Medicare anniversary) . Worse function, recent URI, balc pain and autoimmune reaction, less trips outside home.  Step counmt  A and forms available to patient in AVS       She does NOT have a Power of  for [employer] on file in [place].    Advance care planning including the explanation and discussion of advance directives standard forms performed Face to Face with patient and 01/04/2019    ALT 20 01/04/2019    CA 9.2 01/04/2019    ALB 3.8 01/04/2019    TSH 0.073 (L) 01/04/2019    CREATSERUM 1.02 01/04/2019    GLU 89 01/04/2019        CBC  (most recent labs)   Lab Results   Component Value Date    WBC 3.9 (L) 01/04/2019    HGB 1 Cancer in her father, mother, paternal grandfather, and paternal grandmother; Heart Disease in her father; Heart Disorder in her maternal grandfather and maternal grandmother; Stroke in her maternal grandfather; Thyroid Disorder in her sister.    SOCIAL HIS HENT:   Head: Normocephalic. Right Ear: Hearing, tympanic membrane, external ear and ear canal normal. No middle ear effusion. Left Ear: Hearing, tympanic membrane, external ear and ear canal normal.  No middle ear effusion.    Nose: Nose normal. 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    Screening Mammogram      Mammogram  Annually to 76, then as discussed Mammogram due on 06/28/2012 Update Health Maintenance if applicable     Immunizations (Update Immunization Activity if applicable)     Influenza  Covered Annually No vaccine hi

## 2019-02-27 NOTE — PATIENT INSTRUCTIONS
Azeb Dorsey's SCREENING SCHEDULE   Tests on this list are recommended by your physician but may not be covered, or covered at this frequency, by your insurer. Please check with your insurance carrier before scheduling to verify coverage.    TORO 73-68 years old and have smoked more than 100 cigarettes in their lifetime   • Anyone with a family history    Colorectal Cancer Screening  Covered up to Age 76     Colonoscopy Screen   Covered every 10 years- more often if abnormal Colonoscopy due on 05/2 (Prevnar)  Covered Once after 65 No orders found for this or any previous visit. Please get once after your 65th birthday    Pneumococcal 23 (Pneumovax)  Covered Once after 65 No orders found for this or any previous visit.  Please get once after your 65th

## 2019-03-05 ENCOUNTER — PATIENT MESSAGE (OUTPATIENT)
Dept: FAMILY MEDICINE CLINIC | Facility: CLINIC | Age: 54
End: 2019-03-05

## 2019-03-05 NOTE — TELEPHONE ENCOUNTER
From: Chris Benton  To: Francisca Edwards MD  Sent: 3/5/2019 2:44 PM CST  Subject: Visit Es Hsu,     My symptoms have been getting worse, since our appointment last Wednesday. I have been sleeping for 16 hours and can no longer stay awake during the day. I did follow up on the in-home services. There is a two week wait. I think it is time to see the neurologist. I realize I am to do the home counseling first, but I feel these symptoms are caused by my illness. Please advise.     Chris Benton

## 2019-03-12 ENCOUNTER — OFFICE VISIT (OUTPATIENT)
Dept: NEUROLOGY | Facility: CLINIC | Age: 54
End: 2019-03-12
Payer: MEDICARE

## 2019-03-12 VITALS — RESPIRATION RATE: 18 BRPM

## 2019-03-12 DIAGNOSIS — G04.90 LIMBIC ENCEPHALITIS: Primary | ICD-10-CM

## 2019-03-12 DIAGNOSIS — R41.3 MEMORY LOSS: ICD-10-CM

## 2019-03-12 PROCEDURE — 99215 OFFICE O/P EST HI 40 MIN: CPT | Performed by: OTHER

## 2019-03-12 NOTE — PROGRESS NOTES
[employer] Progress Note    HPI  As per my initial H&P from 3/26/15:  Patient presents with:  Neurologic Problem      \"Mini Martin is a 44year old, who presents for evaluation of fatigue, and pain, with previous diagnosis of limb several years. She currently is also having issues with low back pain, and admits to having issues with standing for a prolonged period. She states she notes numbness in both of her legs, but worse in the lower legs.  She denies burning sensation and den disorder    • Gastric ulcer 6/29/2012   • History of blood transfusion     Plasmapheresis weekly   • Intrauterine device 1997    none now   • Kidney infection 01/2017   • Thyroid disease      Past Surgical History:   Procedure Laterality Date   • Franklin Garcia Oral Capsule SR 24 Hr, TAKE 2 CAPSULES BY MOUTH EVERY DAY., Disp: 180 capsule, Rfl: 4  •  cyanocobalamin 1000 MCG/ML Injection Solution, Inject into the muscle daily.  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 . . .    KAPPA FREE LIGHT CHAIN      0.330-1.940 mg/dL 1.219   LAMBDA FREE LIGHT CHAIN      0.571-2.630 mg/dL 0.980   KAPPA/LAMBDA FLC RATIO      0.26-1.65 1.24   TOTAL VOLUME CSF       11.0   COUNT PERFORMED O ventricles and sulci are within normal limits. There is no midline shift or mass effect. The basal cisterns are patent. The gray-white matter differentiation is intact. The craniocervical junction is unremarkable.    There is no acute intracranial hemorrhag significant disc abnormality. No significant foraminal narrowing or lateral recess compromise  L4-L5: Diffuse annular bulge with posterior annular tear and approximate 3 mm central protrusion slightly deforming the thecal sac.  Facet hypertrophy and ligam and was doing well until recently now with worsening fatigue as well as confusion; no new neurologic findings and was doing well before; unclear if this is flare of limbic encephalitis or nonspecific fatigue or complication of chronic limbic encephalitis -

## 2019-03-22 ENCOUNTER — NURSE ONLY (OUTPATIENT)
Dept: ELECTROPHYSIOLOGY | Facility: HOSPITAL | Age: 54
End: 2019-03-22
Attending: Other
Payer: MEDICARE

## 2019-03-22 ENCOUNTER — APPOINTMENT (OUTPATIENT)
Dept: LAB | Facility: HOSPITAL | Age: 54
End: 2019-03-22
Attending: Other
Payer: MEDICARE

## 2019-03-22 ENCOUNTER — HOSPITAL ENCOUNTER (OUTPATIENT)
Dept: MRI IMAGING | Facility: HOSPITAL | Age: 54
Discharge: HOME | End: 2019-03-22
Attending: Other
Payer: MEDICARE

## 2019-03-22 DIAGNOSIS — R41.3 MEMORY LOSS: ICD-10-CM

## 2019-03-22 DIAGNOSIS — G04.90 LIMBIC ENCEPHALITIS: ICD-10-CM

## 2019-03-22 LAB — VIT B12 SERPL-MCNC: 1909 PG/ML (ref 193–986)

## 2019-03-22 PROCEDURE — 36415 COLL VENOUS BLD VENIPUNCTURE: CPT

## 2019-03-22 PROCEDURE — 95816 EEG AWAKE AND DROWSY: CPT | Performed by: OTHER

## 2019-03-22 PROCEDURE — 70553 MRI BRAIN STEM W/O & W/DYE: CPT | Performed by: OTHER

## 2019-03-22 PROCEDURE — A9575 INJ GADOTERATE MEGLUMI 0.1ML: HCPCS | Performed by: OTHER

## 2019-03-22 PROCEDURE — 82607 VITAMIN B-12: CPT

## 2019-03-27 ENCOUNTER — PATIENT MESSAGE (OUTPATIENT)
Dept: TELEHEALTH | Age: 54
End: 2019-03-27

## 2019-03-27 NOTE — PROCEDURES
[address] in [place]  in affiliation with [facility]  [address]  [place], [address] [place] [address]  [phone]  Fax [phone]      Name: Sarasotaken Campos  10/5/1965  Date of [address]

## 2019-04-01 ENCOUNTER — TELEPHONE (OUTPATIENT)
Dept: NEUROLOGY | Facility: CLINIC | Age: 54
End: 2019-04-01

## 2019-04-01 NOTE — TELEPHONE ENCOUNTER
Notified patient of Vitamin B12 result. Advice pt to continue with her Vitamin B12 injection because vitamin b12 is a water soluble. All the questions answered and pt verbalized the understanding.

## 2019-04-01 NOTE — TELEPHONE ENCOUNTER
----- Message from Liliana Arguello MD sent at 3/29/2019  3:56 PM CDT -----  Results noted, B12 not low; ok

## 2019-04-02 ENCOUNTER — OFFICE VISIT (OUTPATIENT)
Dept: NEUROLOGY | Facility: CLINIC | Age: 54
End: 2019-04-02
Payer: MEDICARE

## 2019-04-02 VITALS
RESPIRATION RATE: 16 BRPM | SYSTOLIC BLOOD PRESSURE: 130 MMHG | HEIGHT: 62 IN | HEART RATE: 101 BPM | WEIGHT: 118 LBS | BODY MASS INDEX: 21.71 KG/M2 | DIASTOLIC BLOOD PRESSURE: 74 MMHG

## 2019-04-02 DIAGNOSIS — G04.90 LIMBIC ENCEPHALITIS: Primary | ICD-10-CM

## 2019-04-02 DIAGNOSIS — R41.3 MEMORY LOSS: ICD-10-CM

## 2019-04-02 PROCEDURE — 99214 OFFICE O/P EST MOD 30 MIN: CPT | Performed by: OTHER

## 2019-04-02 RX ORDER — TRAZODONE HYDROCHLORIDE 100 MG/1
100 TABLET ORAL NIGHTLY
COMMUNITY
End: 2019-08-23

## 2019-04-02 NOTE — PROGRESS NOTES
[facility] Progress Note    HPI  As per my initial H&P from 3/26/15:  Patient presents with:  Neurologic Problem:  Follow up on Limbic encephalitis       \"Mini Shaw is a 44year old, who presents for evaluation of fatigue, and pa when standing, but has not had any in several years. She currently is also having issues with low back pain, and admits to having issues with standing for a prolonged period.  She states she notes numbness in both of her legs, but worse in the lower legs Heart Disease Father    • Heart Disorder Maternal Grandfather    • Stroke Maternal Grandfather    • Heart Disorder Maternal Grandmother    • Cancer Mother         cervical   • Cancer Paternal Grandfather    • Cancer Paternal Grandmother    • Thyroid Disord nourished, no acute distress; alert and awake   HEENT: normocephalic  Heart; normal A8/X6, regular rate and rhythm  Lungs: clear to auscultation bilaterally    Neck: supple, full range of motion; no carotid bruits    Neuro:  Mental status:  Orientation:  Al Bands 0   CSF OLIG INTERPRETATION       See Note   IMMUNOGLOBULIN G CSF      0.0-6.0 mg/dL 1.6   ALBUMIN, CSF      0-35 mg/dL 57 (H)   CSF IGG/ALBUMIN RATIO      0.09-0.25 ratio 0.03 (L)   PT      12.0-15.0 seconds 14.3   INR      0.86-1.15 1.08   VOLTAGE- imaging noted below  MRI brain with and without contrast (2/23/16):    FINDINGS:     The ventricles and sulci are within normal limits.  There is no midline shift or mass effect.  The basal cisterns are patent.         There is no acute intracranial hemorrh air cells are unremarkable. The expected major intracranial flow voids are present.  =====  CONCLUSION:   1. No acute intracranial abnormality identified. 2. Trace chronic microvascular ischemic changes in the cerebral white matter.        MRI lumbar spine significant changes      Epileptiform activity: None     Seizures: none     Events: none     Impression: This EEG is normal.  No epileptiform activity, abnormal slowing or clinical or electrographic seizures were noted on this awake and drowsy recording. diagnostic workup; given normal MRI and EEG, no additional workup at this time; if symptoms of memory loss progress or seizure like events occur, consider 24 hr ambulatory EEG    (G04.90) Limbic encephalitis  (primary encounter diagnosis)  Plan: as noted a

## 2019-05-01 ENCOUNTER — TELEPHONE (OUTPATIENT)
Dept: FAMILY MEDICINE CLINIC | Facility: CLINIC | Age: 54
End: 2019-05-01

## 2019-05-01 ENCOUNTER — OFFICE VISIT (OUTPATIENT)
Dept: FAMILY MEDICINE CLINIC | Facility: CLINIC | Age: 54
End: 2019-05-01
Payer: MEDICARE

## 2019-05-01 VITALS
DIASTOLIC BLOOD PRESSURE: 78 MMHG | HEIGHT: 62 IN | BODY MASS INDEX: 21.16 KG/M2 | HEART RATE: 80 BPM | SYSTOLIC BLOOD PRESSURE: 122 MMHG | WEIGHT: 115 LBS | TEMPERATURE: 99 F | RESPIRATION RATE: 12 BRPM

## 2019-05-01 DIAGNOSIS — G04.81: Primary | ICD-10-CM

## 2019-05-01 DIAGNOSIS — F33.0 MILD RECURRENT MAJOR DEPRESSION (HCC): ICD-10-CM

## 2019-05-01 DIAGNOSIS — G89.29 CHRONIC MIDLINE LOW BACK PAIN WITHOUT SCIATICA: ICD-10-CM

## 2019-05-01 DIAGNOSIS — Z12.31 VISIT FOR SCREENING MAMMOGRAM: ICD-10-CM

## 2019-05-01 DIAGNOSIS — G47.61 PERIODIC LIMB MOVEMENT DISORDER (PLMD): ICD-10-CM

## 2019-05-01 DIAGNOSIS — E03.9 ACQUIRED HYPOTHYROIDISM: ICD-10-CM

## 2019-05-01 DIAGNOSIS — M54.50 CHRONIC MIDLINE LOW BACK PAIN WITHOUT SCIATICA: ICD-10-CM

## 2019-05-01 DIAGNOSIS — Z12.31 ENCOUNTER FOR SCREENING MAMMOGRAM FOR MALIGNANT NEOPLASM OF BREAST: ICD-10-CM

## 2019-05-01 DIAGNOSIS — G47.10 HYPERSOMNIA: ICD-10-CM

## 2019-05-01 PROCEDURE — 99214 OFFICE O/P EST MOD 30 MIN: CPT | Performed by: FAMILY MEDICINE

## 2019-05-01 RX ORDER — PRAMIPEXOLE DIHYDROCHLORIDE 0.12 MG/1
0.12 TABLET ORAL NIGHTLY
Qty: 90 TABLET | Refills: 1 | Status: SHIPPED | OUTPATIENT
Start: 2019-05-01 | End: 2019-08-23

## 2019-05-01 RX ORDER — DEXTROAMPHETAMINE SACCHARATE, AMPHETAMINE ASPARTATE, DEXTROAMPHETAMINE SULFATE AND AMPHETAMINE SULFATE 5; 5; 5; 5 MG/1; MG/1; MG/1; MG/1
20 TABLET ORAL 2 TIMES DAILY
Qty: 60 TABLET | Refills: 0 | Status: SHIPPED | OUTPATIENT
Start: 2019-06-30 | End: 2019-10-30 | Stop reason: ALTCHOICE

## 2019-05-01 RX ORDER — DEXTROAMPHETAMINE SACCHARATE, AMPHETAMINE ASPARTATE, DEXTROAMPHETAMINE SULFATE AND AMPHETAMINE SULFATE 5; 5; 5; 5 MG/1; MG/1; MG/1; MG/1
20 TABLET ORAL 2 TIMES DAILY
Qty: 60 TABLET | Refills: 0 | Status: SHIPPED | OUTPATIENT
Start: 2019-05-31 | End: 2019-06-30 | Stop reason: WASHOUT

## 2019-05-01 RX ORDER — DEXTROAMPHETAMINE SACCHARATE, AMPHETAMINE ASPARTATE, DEXTROAMPHETAMINE SULFATE AND AMPHETAMINE SULFATE 5; 5; 5; 5 MG/1; MG/1; MG/1; MG/1
20 TABLET ORAL 2 TIMES DAILY
Qty: 60 TABLET | Refills: 0 | Status: SHIPPED | OUTPATIENT
Start: 2019-05-01 | End: 2019-05-31 | Stop reason: WASHOUT

## 2019-05-01 RX ORDER — MODAFINIL 100 MG/1
100 TABLET ORAL DAILY
Qty: 90 TABLET | Refills: 1 | Status: SHIPPED | OUTPATIENT
Start: 2019-05-01 | End: 2019-09-04

## 2019-05-01 NOTE — PROGRESS NOTES
No chief complaint on file. Subjective   HPI:   This is a 52year old female coming in for follow up fatigue. Better recently, trouble PLMD,   Want to try provigil and mirapex, will try, scolioisis.      Lower back pain and large chested, asking about present. Pulmonary/Chest: Effort normal and breath sounds normal. No stridor. No respiratory distress. Abdominal: Soft. Normal appearance and bowel sounds are normal. There is no tenderness.    Neurological: She is alert and oriented to person, place, an Musculoskeletal    Chronic midline low back pain without sciatica    Current Assessment & Plan     Home Exercise Program and Xray if no change in 2 to 4 weeks           Other Visit Diagnoses     Encounter for screening mammogram for malignant neoplasm of b

## 2019-05-02 NOTE — TELEPHONE ENCOUNTER
Initiated PA for Modafinil 100mg by accessing [employer] form on [facility]. Entered pertinent info, pt diagnosis G47.10 and answered applicable questions. Sent to plan. Received approval for Modafanil 100mg from 5/02/19 until 11/02/19. Leeann and pt notified.

## 2019-05-02 NOTE — ASSESSMENT & PLAN NOTE
Stable, Continue present management.     Thyroid  (most recent labs)   Lab Results   Component Value Date/Time    TSH 0.073 (L) 01/04/2019 05:28 PM    T4F 1.1 01/04/2019 05:28 PM         Endocrine Medications          Levothyroxine Sodium 100 MCG Oral Tab

## 2019-08-19 ENCOUNTER — TELEPHONE (OUTPATIENT)
Dept: FAMILY MEDICINE CLINIC | Facility: CLINIC | Age: 54
End: 2019-08-19

## 2019-08-19 NOTE — TELEPHONE ENCOUNTER
LM again to see if patient can reschedule from 8/23/19 at 3 pm to earlier in the day at 11:30 ([facility]) spot per Yolanda.

## 2019-08-19 NOTE — TELEPHONE ENCOUNTER
LM for patient to see if she will reschedule her appt on 8/23/19 at 3 pm to 8/23/19 at 11:30 am because the office is closing at 3 pm due to the water being shut off in our building.

## 2019-08-23 ENCOUNTER — TELEPHONE (OUTPATIENT)
Dept: FAMILY MEDICINE CLINIC | Facility: CLINIC | Age: 54
End: 2019-08-23

## 2019-08-23 NOTE — PROGRESS NOTES
Patient presents with:  Medication Follow-Up: adderall + other issues      HPI:   Trell Cadet is a 52year old female with significant PMH notable for ADD, limbic encephalitis, chronic pain who presents to the office for medication check up after 3 mo Hypersomnia  On Provigil  Given 90 tabs a few onths ago  Advised to increase this to 200mg (2 of the 100mg). Let us know in a few weeks if this is more effective. Max dose 400mg a day.      4. Mild recurrent major depression (HCC)  Continue the venlafaxin

## 2019-08-23 NOTE — TELEPHONE ENCOUNTER
Received incoming fax from [facility] requesting a prior authorization on Hydrocodone /acetaminophen 5-325 tb  LM explaining process to patient  Fax in triage

## 2019-09-03 ENCOUNTER — PATIENT MESSAGE (OUTPATIENT)
Dept: FAMILY MEDICINE CLINIC | Facility: CLINIC | Age: 54
End: 2019-09-03

## 2019-09-03 NOTE — TELEPHONE ENCOUNTER
Called and LMOM for pt re PA for Hydrocodone. She has been on this medication for several years and we have not done a PA in the past. Pt to call back if she wants me to proceed with the PA.

## 2019-09-04 RX ORDER — MODAFINIL 200 MG/1
200 TABLET ORAL DAILY
Qty: 90 TABLET | Refills: 1 | Status: SHIPPED | OUTPATIENT
Start: 2019-09-04 | End: 2019-09-27

## 2019-09-04 NOTE — TELEPHONE ENCOUNTER
From: Baljeet Arzate  To: Iron Galindo MD  Sent: 9/3/2019 7:52 PM CDT  Subject: Other    Hi Dr Stefan Arzola,  Dr Dilip Crum suggested I try taking two of the Modafinil instead of one, to see if it would help with the fatigue. He asked me to check in with you, to let you know how it was working. It is helping a little. I feel better. I have not been napping as much. I may need a higher dose, but I can tell its helping some. Dr Dilip Crum did not write a prescription because I had a 30-day supply on hand. I will need a new prescription for the increase and most likely prior authorization. Additionally, I need prior authorization for the hydrocodone prescription written by Dr iDlip Crum. Normally, this is not necessary. However, because the prescription was written for a 30-day supply it is needed. Thank you for your help.    Baljeet Arzate

## 2019-09-05 NOTE — TELEPHONE ENCOUNTER
Initiated PA for Hydrocodone/APAP 5/325 by accessing [employer] form on [facility]. Entered pertinent info, pt diagnosis G89.4 and answered applicable questions. Sent to plan.

## 2019-09-10 NOTE — TELEPHONE ENCOUNTER
Received approval for Hydrocodone/APAP 5/325 for use beyond a 7 day supply safety limit through 12/31/19. Leeann and pt notified.

## 2019-09-18 ENCOUNTER — TELEPHONE (OUTPATIENT)
Dept: FAMILY MEDICINE CLINIC | Facility: CLINIC | Age: 54
End: 2019-09-18

## 2019-09-18 NOTE — TELEPHONE ENCOUNTER
LM with pt to call back and schedule her Medicare Annual due Feb 2020.  Pt will wait to call back at a later time

## 2019-09-27 ENCOUNTER — PATIENT MESSAGE (OUTPATIENT)
Dept: FAMILY MEDICINE CLINIC | Facility: CLINIC | Age: 54
End: 2019-09-27

## 2019-09-27 RX ORDER — MODAFINIL 200 MG/1
300 TABLET ORAL DAILY
Qty: 135 TABLET | Refills: 1 | Status: SHIPPED | OUTPATIENT
Start: 2019-09-27 | End: 2019-10-30 | Stop reason: ALTCHOICE

## 2019-09-27 NOTE — TELEPHONE ENCOUNTER
From: Jt Yeboah  To: Duane Deleon MD  Sent: 9/27/2019 3:47 PM CDT  Subject: Anastasiya José Miguel Marley,  I have a question regarding the Modafinil. I'm extremely exhausted. I can only manage to stay awake several hours after I wake up. Can we increase the dosage? I'm sure Medicare would need pre-authorization. I am not functioning at all. I have not been taking the Hydrocodone during the day. I'm sure it will make me sleepy. It doesn't seem to help much. I don't know if I am waiting too long before I take it and the pain level is too high or that I've been on it too many years. I actually think the pain is contributing to my fatigue. I plan on seeing the neurologist, after I finish my dental work. I know everything needs to be in order before any type of treatment. My next appointment with you is October 30. Thanks.  Yuliya Cruz

## 2019-10-22 ENCOUNTER — PATIENT MESSAGE (OUTPATIENT)
Dept: FAMILY MEDICINE CLINIC | Facility: CLINIC | Age: 54
End: 2019-10-22

## 2019-10-22 NOTE — TELEPHONE ENCOUNTER
From: Paola Quevedo  To: Bartolo Garcia MD  Sent: 10/22/2019 11:11 AM CDT  Subject: Non-Urgent Medical Question    Hi Dr. Kong Herrmann,   I wanted to reduce my daily intake of Modafinil from 300 mg to 200 mg. There are several reasons for this. First, my exhaustion is at an all time high. It occurred to me the high dose of Modafinil may be having the opposite effect. Secondly, the pain has increased significantly. Knowing much of this pain is not caused by physical injury, I'm wondering if the extra stimulation to my brain might have something to do with it. Lastly, I have been extremely nauseated. I attributed it to the high pain levels. However, it could be a side effect of the Modafinil. I didn't want to make any medication changes without consulting you first. My current weight is 113 pound. My next appointment with you is next week, on October 30th.  ESPERANZA VELIZ

## 2019-10-27 ENCOUNTER — PATIENT MESSAGE (OUTPATIENT)
Dept: FAMILY MEDICINE CLINIC | Facility: CLINIC | Age: 54
End: 2019-10-27

## 2019-10-28 NOTE — TELEPHONE ENCOUNTER
Ok to increase dose from BID to q 6 prn, but will need Face to Face to refilll.  Thanks, Meliza Pacheco MD

## 2019-10-28 NOTE — TELEPHONE ENCOUNTER
Future Appointments   Date Time Provider Milla Garcia   10/30/2019  1:30 PM Familia Reid MD EM[facility] 3 [facility]     Pt sent My Chart message that she is in extreme pain. I called her and she is having facial pain and she through her back out.     Jennifer

## 2019-10-28 NOTE — TELEPHONE ENCOUNTER
From: Jenna Nascimento  To: No Oden MD  Sent: 10/27/2019 10:47 PM CDT  Subject: Prescription Question    Hi Dr. Sapp Read,  I have an appointment with you next Wednesday.  However, I am currently in an extreme amount of pain, which has been going on since F

## 2019-10-30 ENCOUNTER — TELEPHONE (OUTPATIENT)
Dept: FAMILY MEDICINE CLINIC | Facility: CLINIC | Age: 54
End: 2019-10-30

## 2019-10-30 ENCOUNTER — OFFICE VISIT (OUTPATIENT)
Dept: FAMILY MEDICINE CLINIC | Facility: CLINIC | Age: 54
End: 2019-10-30
Payer: MEDICARE

## 2019-10-30 VITALS
SYSTOLIC BLOOD PRESSURE: 122 MMHG | HEIGHT: 62 IN | TEMPERATURE: 98 F | WEIGHT: 113.19 LBS | HEART RATE: 76 BPM | DIASTOLIC BLOOD PRESSURE: 72 MMHG | BODY MASS INDEX: 20.83 KG/M2 | RESPIRATION RATE: 12 BRPM

## 2019-10-30 DIAGNOSIS — M54.2 NECK PAIN: ICD-10-CM

## 2019-10-30 DIAGNOSIS — G89.4 CHRONIC PAIN SYNDROME: ICD-10-CM

## 2019-10-30 DIAGNOSIS — F90.0 ADHD (ATTENTION DEFICIT HYPERACTIVITY DISORDER), INATTENTIVE TYPE: Chronic | ICD-10-CM

## 2019-10-30 DIAGNOSIS — E03.9 ACQUIRED HYPOTHYROIDISM: Chronic | ICD-10-CM

## 2019-10-30 DIAGNOSIS — F33.0 MILD RECURRENT MAJOR DEPRESSION (HCC): Chronic | ICD-10-CM

## 2019-10-30 DIAGNOSIS — G89.29 CHRONIC MIDLINE LOW BACK PAIN WITHOUT SCIATICA: ICD-10-CM

## 2019-10-30 DIAGNOSIS — G04.81: Primary | Chronic | ICD-10-CM

## 2019-10-30 DIAGNOSIS — E06.3 CHRONIC LYMPHOCYTIC THYROIDITIS: Chronic | ICD-10-CM

## 2019-10-30 DIAGNOSIS — M54.50 CHRONIC MIDLINE LOW BACK PAIN WITHOUT SCIATICA: ICD-10-CM

## 2019-10-30 PROCEDURE — 99214 OFFICE O/P EST MOD 30 MIN: CPT | Performed by: FAMILY MEDICINE

## 2019-10-30 RX ORDER — PREDNISONE 10 MG/1
TABLET ORAL
Qty: 40 TABLET | Refills: 0 | Status: SHIPPED | OUTPATIENT
Start: 2019-10-30 | End: 2019-11-18

## 2019-10-30 RX ORDER — HYDROCODONE BITARTRATE AND ACETAMINOPHEN 5; 325 MG/1; MG/1
1 TABLET ORAL 2 TIMES DAILY
Qty: 30 TABLET | Refills: 0 | Status: SHIPPED | OUTPATIENT
Start: 2019-10-30 | End: 2020-02-27

## 2019-10-30 RX ORDER — BACLOFEN 10 MG/1
10 TABLET ORAL 3 TIMES DAILY PRN
Qty: 30 TABLET | Refills: 0 | Status: SHIPPED | OUTPATIENT
Start: 2019-10-30 | End: 2019-11-18

## 2019-10-30 NOTE — PROGRESS NOTES
Patient presents with:  ADHD: medication f/u       Subjective   HPI:   This is a 62year old female coming in for follow up fatigue, off stimulants 3 days, adderall and modinafil. Lionel Goff lots of pain, 1st day out of bed in 7 days.  Chronic facial pain, but increa and breath sounds normal. No respiratory distress. She has no wheezes. Abdominal: Soft. Bowel sounds are normal. There is no tenderness. Neurological: She is alert and oriented to person, place, and time. She has normal strength.    Skin: Skin is warm a pain without sciatica    Current Assessment & Plan     arrange back pain and follow.  Xray orderd, cautious use of pain meds         Relevant Orders    XR LUMBAR SPINE (MIN 4 VIEWS) (CPT=72110)      Other Visit Diagnoses     Neck pain        Relevant Orders

## 2019-10-30 NOTE — TELEPHONE ENCOUNTER
The plan is not to have her long-term on this pain medication so I gave a 30 tablet supply. We can see how she does in the meantime.

## 2019-10-30 NOTE — TELEPHONE ENCOUNTER
Patient is calling with a medication question, Dr. Teena Sharif changed her hydrocodone medication to be taken every 4 hours as needed and it was sent with previous dosage for 2 times a day.  Please check

## 2019-11-04 NOTE — TELEPHONE ENCOUNTER
Called and talked to patient explained the plan to her she is OK with this plan and will follow up as needed

## 2019-11-18 ENCOUNTER — TELEPHONE (OUTPATIENT)
Dept: NEUROLOGY | Facility: CLINIC | Age: 54
End: 2019-11-18

## 2019-11-18 ENCOUNTER — OFFICE VISIT (OUTPATIENT)
Dept: NEUROLOGY | Facility: CLINIC | Age: 54
End: 2019-11-18
Payer: MEDICARE

## 2019-11-18 VITALS
WEIGHT: 113 LBS | HEIGHT: 62 IN | DIASTOLIC BLOOD PRESSURE: 70 MMHG | HEART RATE: 74 BPM | RESPIRATION RATE: 16 BRPM | SYSTOLIC BLOOD PRESSURE: 120 MMHG | BODY MASS INDEX: 20.8 KG/M2

## 2019-11-18 DIAGNOSIS — R41.3 MEMORY LOSS: Primary | ICD-10-CM

## 2019-11-18 DIAGNOSIS — Z79.891 LONG TERM (CURRENT) USE OF OPIATE ANALGESIC: ICD-10-CM

## 2019-11-18 DIAGNOSIS — G04.90 LIMBIC ENCEPHALITIS: ICD-10-CM

## 2019-11-18 PROCEDURE — 99215 OFFICE O/P EST HI 40 MIN: CPT | Performed by: OTHER

## 2019-11-18 NOTE — TELEPHONE ENCOUNTER
Talked with Hettie Ahumada, she wants to be admitted in the hospital to get lumbar Puncture, EEG, Xray and labs test to be done. RN advised patient that all this test can be done outpatient at [facility] by calling central scheduling.      Patient insisted on deborain

## 2019-11-18 NOTE — PROGRESS NOTES
[facility] Progress Note    HPI  As per my initial H&P from 3/26/15:  Patient presents with:  Neurologic Problem:  Follow up      \"Mini Machado is a 44year old, who presents for evaluation of fatigue, and pain, with previous diagno had any in several years. She currently is also having issues with low back pain, and admits to having issues with standing for a prolonged period. She states she notes numbness in both of her legs, but worse in the lower legs.  She denies burning sensat COLONOSCOPY N/A 5/24/2016    Performed by Uli Galo MD at [facility] ENDOSCOPY   • ESOPHAGOGASTRODUODENOSCOPY (EGD) N/A 5/24/2016    Performed by Uli Galo MD at [address] [place] [address] ENDOSCOPY     Family History   Problem Relation Age of Onset   • Cancer Father Estimated body mass index is 20.67 kg/m² as calculated from the following:    Height as of this encounter: 62\". Weight as of this encounter: 113 lb (51.3 kg).     Gen: well developed, well nourished, no acute distress; alert and awake   HEENT: normoce 0.571-2.630 mg/dL 0.980   KAPPA/LAMBDA FLC RATIO      0.26-1.65 1.24   TOTAL VOLUME CSF       11.0   COUNT PERFORMED ON TUBE       4   COLOR CSF      Colorless Colorless   CLARITY CSF      Clear Clear   WBC CSF      0-5 /mm3 1   RBC CSF       0   Vitamin this is likely related to differences in MRI scanner. The current 3 june scanner likely due techs more these lesions. These are   possibly related to sequelae of migraine headaches.   These may also be sequelae of minimal chronic small vessel ischemic di acute ischemia/infarction. There is stable slight asymmetric prominence of the right choroidal fissure. The temporal lobes are otherwise symmetric and unremarkable. There is no evidence of cortical dysplasia or migrational abnormality.  No heterotopic gray protrusion  Central and anterior/protrusion at L4-5. Diagnostics:   New since last visit    EEG (3/22/19):    Background activity: Posterior dominant background rhythm consisted of 9-10 Hz, 30-60 uV alpha rhythm, which was reactive to eye closure and eye EEG normal as well. Previously, patient was endorsing symptoms of memory loss, but there was no objective evidence for memory loss on testing. Currently, she has evidence for impaired memory, as well as worsening fatigue.   In order to evaluate for pos Long term (current) use of opiate analgesic  Plan: HEMOGLOBIN A1C        As noted above     40 total minutes spent with patient >50% of visit was spent in counseling and coordination of care, including discussion of diagnostic workup and recommendation for

## 2019-11-20 NOTE — TELEPHONE ENCOUNTER
Talked with Clint Durham, She will call central scheduling and make an apt for all the test (LP, EEG, Xray and Labs). If there is any problem getting the test done, she will call the office. Patient verbalized the understanding.

## 2019-12-02 ENCOUNTER — TELEPHONE (OUTPATIENT)
Dept: NEUROLOGY | Facility: CLINIC | Age: 54
End: 2019-12-02

## 2019-12-02 RX ORDER — HYDROCODONE BITARTRATE AND ACETAMINOPHEN 5; 325 MG/1; MG/1
1 TABLET ORAL EVERY 6 HOURS PRN
COMMUNITY
End: 2020-04-07

## 2019-12-02 NOTE — TELEPHONE ENCOUNTER
Pt concerned about upcoming tests and her current medations. Medications - steroids,hyrdocodone  Upcoming tests - CHEYANNE reflex, labs, LPI    Call pt to discuss current medications and upcoming tests if will not effect her results.

## 2019-12-02 NOTE — TELEPHONE ENCOUNTER
Talked with Ivana Deleon, she was taking oral steroid which was ended last Friday. She was concern whether her lab testing or LP will get affected while being on Steroid or Norco.    According to Dr Heidi Kemp, it is okay to go and get all the lab test done.

## 2019-12-05 ENCOUNTER — NURSE ONLY (OUTPATIENT)
Dept: LAB | Facility: HOSPITAL | Age: 54
End: 2019-12-05
Attending: Other
Payer: MEDICARE

## 2019-12-05 ENCOUNTER — HOSPITAL ENCOUNTER (OUTPATIENT)
Dept: GENERAL RADIOLOGY | Facility: HOSPITAL | Age: 54
Discharge: HOME | End: 2019-12-05
Attending: Other
Payer: MEDICARE

## 2019-12-05 ENCOUNTER — APPOINTMENT (OUTPATIENT)
Dept: LAB | Facility: HOSPITAL | Age: 54
End: 2019-12-05
Attending: Other
Payer: MEDICARE

## 2019-12-05 VITALS
SYSTOLIC BLOOD PRESSURE: 109 MMHG | DIASTOLIC BLOOD PRESSURE: 78 MMHG | HEIGHT: 62 IN | OXYGEN SATURATION: 100 % | TEMPERATURE: 98 F | WEIGHT: 115 LBS | BODY MASS INDEX: 21.16 KG/M2 | RESPIRATION RATE: 14 BRPM | HEART RATE: 71 BPM

## 2019-12-05 DIAGNOSIS — R41.3 MEMORY LOSS: ICD-10-CM

## 2019-12-05 DIAGNOSIS — G04.90 LIMBIC ENCEPHALITIS: Primary | ICD-10-CM

## 2019-12-05 DIAGNOSIS — Z79.891 LONG TERM (CURRENT) USE OF OPIATE ANALGESIC: ICD-10-CM

## 2019-12-05 DIAGNOSIS — G04.90 LIMBIC ENCEPHALITIS: ICD-10-CM

## 2019-12-05 PROCEDURE — 88108 CYTOPATH CONCENTRATE TECH: CPT

## 2019-12-05 PROCEDURE — 85652 RBC SED RATE AUTOMATED: CPT

## 2019-12-05 PROCEDURE — 86334 IMMUNOFIX E-PHORESIS SERUM: CPT

## 2019-12-05 PROCEDURE — 83883 ASSAY NEPHELOMETRY NOT SPEC: CPT

## 2019-12-05 PROCEDURE — 62270 DX LMBR SPI PNXR: CPT | Performed by: OTHER

## 2019-12-05 PROCEDURE — 83036 HEMOGLOBIN GLYCOSYLATED A1C: CPT | Performed by: OTHER

## 2019-12-05 PROCEDURE — 83916 OLIGOCLONAL BANDS: CPT

## 2019-12-05 PROCEDURE — 86140 C-REACTIVE PROTEIN: CPT

## 2019-12-05 PROCEDURE — 89050 BODY FLUID CELL COUNT: CPT

## 2019-12-05 PROCEDURE — 82784 ASSAY IGA/IGD/IGG/IGM EACH: CPT

## 2019-12-05 PROCEDURE — 84165 PROTEIN E-PHORESIS SERUM: CPT

## 2019-12-05 PROCEDURE — 85610 PROTHROMBIN TIME: CPT

## 2019-12-05 PROCEDURE — 77003 FLUOROGUIDE FOR SPINE INJECT: CPT | Performed by: OTHER

## 2019-12-05 PROCEDURE — 86038 ANTINUCLEAR ANTIBODIES: CPT

## 2019-12-05 PROCEDURE — 84157 ASSAY OF PROTEIN OTHER: CPT

## 2019-12-05 PROCEDURE — 82945 GLUCOSE OTHER FLUID: CPT

## 2019-12-05 PROCEDURE — 82607 VITAMIN B-12: CPT

## 2019-12-05 PROCEDURE — 83519 RIA NONANTIBODY: CPT

## 2019-12-05 PROCEDURE — 36415 COLL VENOUS BLD VENIPUNCTURE: CPT

## 2019-12-05 PROCEDURE — 82042 OTHER SOURCE ALBUMIN QUAN EA: CPT

## 2019-12-05 PROCEDURE — 85049 AUTOMATED PLATELET COUNT: CPT | Performed by: RADIOLOGY

## 2019-12-05 RX ORDER — BUPIVACAINE HYDROCHLORIDE 5 MG/ML
INJECTION, SOLUTION EPIDURAL; INTRACAUDAL
Status: DISCONTINUED
Start: 2019-12-05 | End: 2019-12-05 | Stop reason: WASHOUT

## 2019-12-05 RX ORDER — SODIUM CHLORIDE 9 MG/ML
INJECTION, SOLUTION INTRAVENOUS CONTINUOUS
Status: DISCONTINUED | OUTPATIENT
Start: 2019-12-05 | End: 2019-12-07

## 2019-12-05 NOTE — IMAGING NOTE
Pt states she has chronic back pain that she takes Norco 5/325mg for on a daily basis. She states she didn't take her am dose since she didn't know if would interfere with her procedure.  I informed her it wouldn't and it was ok to take her Norco as directe

## 2019-12-20 ENCOUNTER — PATIENT MESSAGE (OUTPATIENT)
Dept: FAMILY MEDICINE CLINIC | Facility: CLINIC | Age: 54
End: 2019-12-20

## 2019-12-21 NOTE — TELEPHONE ENCOUNTER
From: Williams Masters  To: Tracey Webb MD  Sent: 12/20/2019 8:14 PM CST  Subject: Visit Marv Martinez,  My next appointment is 1/31. I'm scheduled for spinal x-rays on 1/8. I had labs & spinal tap on 12/5.  My question: I have one Norco

## 2019-12-22 NOTE — TELEPHONE ENCOUNTER
I should see her here sooner than 1/31 to coincide with pain management. Please move up appointment . Thanks.

## 2019-12-29 ENCOUNTER — PATIENT MESSAGE (OUTPATIENT)
Dept: FAMILY MEDICINE CLINIC | Facility: CLINIC | Age: 54
End: 2019-12-29

## 2020-01-13 ENCOUNTER — TELEPHONE (OUTPATIENT)
Dept: NEUROLOGY | Facility: CLINIC | Age: 55
End: 2020-01-13

## 2020-01-22 ENCOUNTER — PATIENT MESSAGE (OUTPATIENT)
Dept: FAMILY MEDICINE CLINIC | Facility: CLINIC | Age: 55
End: 2020-01-22

## 2020-01-22 NOTE — TELEPHONE ENCOUNTER
From: Dominic Suarez  To: Suzanne Jauregui MD  Sent: 1/22/2020 2:46 PM CST  Subject: Prescription Question    Dr Casper Lundberg: I'm having difficulty with a prescription. I don't know what to do. I have a PA from the [facility]. I saw TIFFANIE Orozco on 1/3/20. He prescribed Fentanyl patches 12.5mcg. I requested the lower dose. They were not strong enough. On 1/6/20, Rubi told me to use two 12.5 patches at the same time. The last two patches were applied 1/17/20. On 1/15/20, I called the office for refill. I've called every day since, some days more than once. On Monday, I was told Karolina Curiel filled out the form, but it needed a MD to authorize. They sent a note to this doctor marked \"Urgent\" that I was out medication. I also let them know Dr Kaplan Porch me off any medication very slowly due to the risk of seizures. That was two days ago, yet nothing has been done.

## 2020-01-22 NOTE — TELEPHONE ENCOUNTER
We find out more about this, I do not have any control over the [facility] levels and other offices to be able to help with the prior authorization process

## 2020-01-25 ENCOUNTER — APPOINTMENT (OUTPATIENT)
Dept: CT IMAGING | Facility: HOSPITAL | Age: 55
End: 2020-01-25
Attending: EMERGENCY MEDICINE
Payer: MEDICARE

## 2020-01-25 ENCOUNTER — HOSPITAL ENCOUNTER (OUTPATIENT)
Facility: HOSPITAL | Age: 55
Setting detail: OBSERVATION
Discharge: HOME | End: 2020-01-28
Attending: EMERGENCY MEDICINE | Admitting: HOSPITALIST
Payer: MEDICARE

## 2020-01-25 DIAGNOSIS — R51.9 HEADACHE DISORDER: Primary | ICD-10-CM

## 2020-01-25 LAB
ANION GAP SERPL CALC-SCNC: 5 MMOL/L (ref 0–18)
BASOPHILS # BLD AUTO: 0.05 X10(3) UL (ref 0–0.2)
BASOPHILS NFR BLD AUTO: 1.1 %
BUN BLD-MCNC: 10 MG/DL (ref 7–18)
BUN/CREAT SERPL: 9.6 (ref 10–20)
CALCIUM BLD-MCNC: 9.4 MG/DL (ref 8.5–10.1)
CHLORIDE SERPL-SCNC: 108 MMOL/L (ref 98–112)
CO2 SERPL-SCNC: 28 MMOL/L (ref 21–32)
CREAT BLD-MCNC: 1.04 MG/DL (ref 0.55–1.02)
DEPRECATED RDW RBC AUTO: 38.5 FL (ref 35.1–46.3)
EOSINOPHIL # BLD AUTO: 0.11 X10(3) UL (ref 0–0.7)
EOSINOPHIL NFR BLD AUTO: 2.4 %
ERYTHROCYTE [DISTWIDTH] IN BLOOD BY AUTOMATED COUNT: 11.7 % (ref 11–15)
GLUCOSE BLD-MCNC: 106 MG/DL (ref 70–99)
HCT VFR BLD AUTO: 42.6 % (ref 35–48)
HGB BLD-MCNC: 14.5 G/DL (ref 12–16)
IMM GRANULOCYTES # BLD AUTO: 0 X10(3) UL (ref 0–1)
IMM GRANULOCYTES NFR BLD: 0 %
LYMPHOCYTES # BLD AUTO: 1.04 X10(3) UL (ref 1–4)
LYMPHOCYTES NFR BLD AUTO: 22.7 %
MCH RBC QN AUTO: 30.9 PG (ref 26–34)
MCHC RBC AUTO-ENTMCNC: 34 G/DL (ref 31–37)
MCV RBC AUTO: 90.8 FL (ref 80–100)
MONOCYTES # BLD AUTO: 0.34 X10(3) UL (ref 0.1–1)
MONOCYTES NFR BLD AUTO: 7.4 %
NEUTROPHILS # BLD AUTO: 3.05 X10 (3) UL (ref 1.5–7.7)
NEUTROPHILS # BLD AUTO: 3.05 X10(3) UL (ref 1.5–7.7)
NEUTROPHILS NFR BLD AUTO: 66.4 %
OSMOLALITY SERPL CALC.SUM OF ELEC: 291 MOSM/KG (ref 275–295)
PLATELET # BLD AUTO: 191 10(3)UL (ref 150–450)
POTASSIUM SERPL-SCNC: 3.6 MMOL/L (ref 3.5–5.1)
RBC # BLD AUTO: 4.69 X10(6)UL (ref 3.8–5.3)
SODIUM SERPL-SCNC: 141 MMOL/L (ref 136–145)
WBC # BLD AUTO: 4.6 X10(3) UL (ref 4–11)

## 2020-01-25 PROCEDURE — 70450 CT HEAD/BRAIN W/O DYE: CPT | Performed by: EMERGENCY MEDICINE

## 2020-01-25 RX ORDER — DIPHENHYDRAMINE HYDROCHLORIDE 50 MG/ML
25 INJECTION INTRAMUSCULAR; INTRAVENOUS ONCE
Status: COMPLETED | OUTPATIENT
Start: 2020-01-25 | End: 2020-01-25

## 2020-01-25 RX ORDER — ACETAMINOPHEN 500 MG
1000 TABLET ORAL ONCE
Status: COMPLETED | OUTPATIENT
Start: 2020-01-25 | End: 2020-01-25

## 2020-01-25 RX ORDER — METOCLOPRAMIDE HYDROCHLORIDE 5 MG/ML
10 INJECTION INTRAMUSCULAR; INTRAVENOUS ONCE
Status: COMPLETED | OUTPATIENT
Start: 2020-01-25 | End: 2020-01-25

## 2020-01-25 RX ORDER — FENTANYL 25 UG/H
1 PATCH TRANSDERMAL ONCE
Status: COMPLETED | OUTPATIENT
Start: 2020-01-25 | End: 2020-01-30

## 2020-01-25 RX ORDER — FENTANYL 25 UG/H
1 PATCH TRANSDERMAL
COMMUNITY
End: 2020-04-28

## 2020-01-25 RX ORDER — KETOROLAC TROMETHAMINE 30 MG/ML
15 INJECTION, SOLUTION INTRAMUSCULAR; INTRAVENOUS ONCE
Status: COMPLETED | OUTPATIENT
Start: 2020-01-25 | End: 2020-01-25

## 2020-01-25 RX ORDER — DEXAMETHASONE SODIUM PHOSPHATE 4 MG/ML
10 VIAL (ML) INJECTION ONCE
Status: COMPLETED | OUTPATIENT
Start: 2020-01-25 | End: 2020-01-25

## 2020-01-26 LAB
T3FREE SERPL-MCNC: 1.44 PG/ML (ref 2.4–4.2)
THYROPEROXIDASE AB SERPL-ACNC: 169 U/ML (ref ?–60)

## 2020-01-26 PROCEDURE — 99214 OFFICE O/P EST MOD 30 MIN: CPT | Performed by: OTHER

## 2020-01-26 PROCEDURE — 99220 INITIAL OBSERVATION CARE,LEVL III: CPT | Performed by: HOSPITALIST

## 2020-01-26 RX ORDER — HYDROCODONE BITARTRATE AND ACETAMINOPHEN 5; 325 MG/1; MG/1
1-2 TABLET ORAL EVERY 6 HOURS PRN
Status: DISCONTINUED | OUTPATIENT
Start: 2020-01-26 | End: 2020-01-28

## 2020-01-26 RX ORDER — KETOROLAC TROMETHAMINE 30 MG/ML
15 INJECTION, SOLUTION INTRAMUSCULAR; INTRAVENOUS EVERY 6 HOURS PRN
Status: ACTIVE | OUTPATIENT
Start: 2020-01-26 | End: 2020-01-28

## 2020-01-26 RX ORDER — POTASSIUM CHLORIDE 20 MEQ/1
40 TABLET, EXTENDED RELEASE ORAL EVERY 4 HOURS
Status: COMPLETED | OUTPATIENT
Start: 2020-01-26 | End: 2020-01-26

## 2020-01-26 RX ORDER — MORPHINE SULFATE 4 MG/ML
4 INJECTION, SOLUTION INTRAMUSCULAR; INTRAVENOUS EVERY 2 HOUR PRN
Status: DISCONTINUED | OUTPATIENT
Start: 2020-01-26 | End: 2020-01-27

## 2020-01-26 RX ORDER — VENLAFAXINE HYDROCHLORIDE 75 MG/1
150 CAPSULE, EXTENDED RELEASE ORAL
Status: DISCONTINUED | OUTPATIENT
Start: 2020-01-26 | End: 2020-01-28

## 2020-01-26 RX ORDER — ACETAMINOPHEN 325 MG/1
650 TABLET ORAL EVERY 6 HOURS PRN
Status: DISCONTINUED | OUTPATIENT
Start: 2020-01-26 | End: 2020-01-28

## 2020-01-26 RX ORDER — ONDANSETRON 2 MG/ML
4 INJECTION INTRAMUSCULAR; INTRAVENOUS EVERY 6 HOURS PRN
Status: DISCONTINUED | OUTPATIENT
Start: 2020-01-26 | End: 2020-01-27

## 2020-01-26 RX ORDER — FENTANYL 25 UG/H
1 PATCH TRANSDERMAL
Status: DISCONTINUED | OUTPATIENT
Start: 2020-01-26 | End: 2020-01-26

## 2020-01-26 RX ORDER — LEVOTHYROXINE SODIUM 0.1 MG/1
100 TABLET ORAL
Status: DISCONTINUED | OUTPATIENT
Start: 2020-01-26 | End: 2020-01-28

## 2020-01-26 RX ORDER — MORPHINE SULFATE 4 MG/ML
2 INJECTION, SOLUTION INTRAMUSCULAR; INTRAVENOUS EVERY 2 HOUR PRN
Status: DISCONTINUED | OUTPATIENT
Start: 2020-01-26 | End: 2020-01-27

## 2020-01-26 RX ORDER — KETOROLAC TROMETHAMINE 30 MG/ML
30 INJECTION, SOLUTION INTRAMUSCULAR; INTRAVENOUS EVERY 6 HOURS PRN
Status: ACTIVE | OUTPATIENT
Start: 2020-01-26 | End: 2020-01-28

## 2020-01-26 RX ORDER — MORPHINE SULFATE 4 MG/ML
1 INJECTION, SOLUTION INTRAMUSCULAR; INTRAVENOUS EVERY 2 HOUR PRN
Status: DISCONTINUED | OUTPATIENT
Start: 2020-01-26 | End: 2020-01-27

## 2020-01-26 RX ORDER — FENTANYL 25 UG/H
1 PATCH TRANSDERMAL
Status: DISCONTINUED | OUTPATIENT
Start: 2020-01-28 | End: 2020-01-28

## 2020-01-26 NOTE — H&P
[facility]     Williams Cordial Patient Status:  Observation    10/5/1965 MRN [id number]   [facility] 4NW-A Attending Srinivas Addison MD   Hosp Day # 0 PCP Tracey Webb MD     Chief Complaint: Headache     Hi central line placement     Social History:  reports that she has never smoked. She has never used smokeless tobacco. She reports that she does not drink alcohol or use drugs.   Family History:   Family History   Problem Relation Age of Onset   • Cancer Fatmarvel CNII-XII grossly intact. No focal neurological deficits. Musculoskeletal: Moves all extremities. Extremities: No edema. No cyanosis. Integument: No rashes or lesions. Psychiatric: Appropriate mood and affect.   Diagnostic Data:    Labs:  Recent Labs

## 2020-01-26 NOTE — CONSULTS
[facility]  Report of Consultation    Alex Mosher Patient Status:  Observation    10/5/1965 MRN [id number]   [facility] 4NW-A Attending Luther Hargrove MD   Hosp Day # 0 PCP Sherice Stern MD     Reason for Consultation:  Headache  A autoimmune / inflammatory workup as well.     Previously, she had responded well to plasmapheresis, but had complications of anemia, and her nonspecific symptoms continued to occur despite having weekly plasmapheresis sessions.   She has been doing well ove ENDOSCOPY   • ESOPHAGOGASTRODUODENOSCOPY (EGD) N/A 5/24/2016    Performed by Katerine Givens MD at [facility] ENDOSCOPY   • OTHER  2016    central line placement     Family History   Problem Relation Age of Onset   • Cancer Father         prostate   • Heart Dis ketorolac tromethamine (TORADOL) 30 MG/ML injection 15 mg, 15 mg, Intravenous, Q6H PRN **OR** ketorolac tromethamine (TORADOL) 30 MG/ML injection 30 mg, 30 mg, Intravenous, Q6H PRN  •  HYDROcodone-acetaminophen (NORCO) 5-325 MG per tab 1-2 tablet, 1-2 tabl 01/25/2020    HCT 42.6 01/25/2020    .0 01/25/2020    CREATSERUM 1.04 01/25/2020    BUN 10 01/25/2020     01/25/2020    K 3.6 01/25/2020     01/25/2020    CO2 28.0 01/25/2020     01/25/2020    CA 9.4 01/25/2020       CSF studies: difficulties with memory and attention. However, these seem minimal on testing and otherwise, she is alert.      She has had similar symptoms in the past and chronically been fatigued - recent workup in office demonstrated minimally elevated protein in CSF

## 2020-01-26 NOTE — ED PROVIDER NOTES
Patient Seen in: [facility] Emergency Department      History   Patient presents with:  Headache    Stated Complaint: headache, hx of encephalitis    HPI    Patient is a 39-ATUA-SBN female, complicated medical history notable for autoimmune encephal There is no guarding. Musculoskeletal: Exhibits no edema or tenderness. Neurological: Pt is alert and oriented to person, place, and time. There is no nystagmus. There are no cranial nerve deficits. Speech is fluent and not slurred.   There is no was given IV fluids, Toradol, Benadryl, Reglan  . Did not feel any better. We will give her some Tylenol and some Decadron. MDM         Patient here with complicated neurological history notable for autoimmune encephalitis.   Follows with Dr. Zelda Miranda

## 2020-01-26 NOTE — ED INITIAL ASSESSMENT (HPI)
Pt c/o \"HA for several weeks with nausea, no vomiting. I'm supposed to be on Fentanyl patch 25 mcg, but I've been having trouble filling the prescription. \" States last Fentanyl patch was a week ago

## 2020-01-26 NOTE — PROGRESS NOTES
[facility]  Progress Note     Malen Long Patient Status:  Observation    10/5/1965 MRN [id number]   [facility] 4NW-A Attending Dread Horn MD   Hosp Day # 0 PCP Yehuda Weems MD     Chief Complaint: HA, weakness    S: Patien weakness/fatigue  1. D/w Dr. Jaden Brown  2. Decadron given. Will do IV steroids x 3-5d. EEG continuous. 3. Consult neuropsych for memory complaints although memory seems to be intact  4. Analgesics, fentanyl patch.  Would not increase dose, leave on norco an

## 2020-01-26 NOTE — PROGRESS NOTES
NURSING ADMISSION NOTE      Patient admitted via Cart  From ER due to headache,  Oriented to room. Safety precautions initiated. Bed in low position. Call light in reach.   Fentanyl patch 25 mcg to right chest applied in ER, neurologist   Paged in ER f

## 2020-01-27 ENCOUNTER — APPOINTMENT (OUTPATIENT)
Dept: MRI IMAGING | Facility: HOSPITAL | Age: 55
End: 2020-01-27
Attending: Other
Payer: MEDICARE

## 2020-01-27 LAB — POTASSIUM SERPL-SCNC: 4.5 MMOL/L (ref 3.5–5.1)

## 2020-01-27 PROCEDURE — 99214 OFFICE O/P EST MOD 30 MIN: CPT | Performed by: OTHER

## 2020-01-27 PROCEDURE — 99225 SUBSEQUENT OBSERVATION CARE: CPT | Performed by: HOSPITALIST

## 2020-01-27 PROCEDURE — 70553 MRI BRAIN STEM W/O & W/DYE: CPT | Performed by: OTHER

## 2020-01-27 RX ORDER — ONDANSETRON 2 MG/ML
4 INJECTION INTRAMUSCULAR; INTRAVENOUS EVERY 4 HOURS PRN
Status: DISCONTINUED | OUTPATIENT
Start: 2020-01-27 | End: 2020-01-28

## 2020-01-27 NOTE — CONSULTS
Padmini Davis is a 62year old female. HPI:      Endocrine consultation:    CC: fatigue, pain    HPI: The pt is a 63 yo with a h/o Hashimotos and limbic encephalitis dx'd at [facility] admitted for progressive HA, severe fatigue, and diffuse body pain.  She d Grandfather    • Cancer Paternal Grandmother    • Thyroid Disorder Sister         hypo, x2        REVIEW OF SYSTEMS:   Ten point review of systems has been performed and is otherwise negative and/or non-contributory, except as described above.      EXAM: 221 (H) 06/28/2011     Lab Results   Component Value Date    HDL 91 (H) 01/04/2019    HDL 85 06/03/2014    HDL 86 06/28/2011     Lab Results   Component Value Date     (H) 01/04/2019     (H) 06/03/2014     (H) 06/28/2011     Lab Result \"hashimotos encephalopathy\" dx'd in 2006 at [facility], however her partial response to steroid therapy during that episode is not typical of this condition  -per pt report, she was placed on solumedrol 1000 mg x 5 days with only a partial response to therapy

## 2020-01-27 NOTE — PSYCHOLOGICAL TESTING
[facility]  Report of Psychiatric Consultation  [facility]  Report of Psychiatric Consultation    Marchren Pardoa Patient Status:  Observation    10/5/1965 MRN [id number]   [facility] 4NW-A Attending Yoana Sandoval MD   [facility] Day breakfast  •  acetaminophen (TYLENOL) tab 650 mg, 650 mg, Oral, Q6H PRN  •  ondansetron HCl (ZOFRAN) injection 4 mg, 4 mg, Intravenous, Q6H PRN  •  ketorolac tromethamine (TORADOL) 30 MG/ML injection 15 mg, 15 mg, Intravenous, Q6H PRN **OR** ketorolac trom hypersomnia and fatigue.   CSF studies previously demonstrated elevated protein but other testing unremarkable; CSF VGKC Ab not done but serum VGKC Ab were not detectable      MRI brain done previously and overall stable and repeat MRI brain remains stable weakness; no falls or balance issues or episodes of loss of awareness or speech arrest.     Otherwise, patient denies any recent weight change, double vision/ blurry vision / loss of vision, chest pain, palpitations, shortness of breath, rashes, joint pain process that is productive rather than maladaptive.   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 Her subtle areas were associated with her impulsive quick nature. On the clock test planning is appropriate.     The patient minimizes depression but yet when trying to provide feedback in regard detail with the patient.

## 2020-01-27 NOTE — PROGRESS NOTES
Answer questions appropriately, still c/o headache and generalized pain, feeling fatigued, c/o nausea but stated she able to keep her dinner down,  Browsing on her computer, Patient aware of 24 hr EEG , and MRI of the brain. Endorsed  To night RN.

## 2020-01-27 NOTE — PROGRESS NOTES
[facility] HOSPITALIST  Progress Note     Juan Luis Hayden Patient Status:  Observation    10/5/1965 MRN [id number]   [facility] 4NW-A Attending Sara Hoover MD   Hosp Day # 0 PCP Puneet Javed MD     Chief Complaint: HA, weakness    S: feels encephalitis. CT brain neg. No confusion. Neurologically intact   2. Generalized weakness/fatigue  1. Decadron given. Will do IV steroids x 3-5d. EEG continuous. 2. MRI relatively unremarkable. Evidence of underlying migraines?   3. Consult neuropsych for

## 2020-01-27 NOTE — PROGRESS NOTES
Patient has been up ambulating in room was able to take a shower by herself this am. Patient has been taking in fluids and some food of her choice. Has been receiving oral  pain meds that have been effective.

## 2020-01-27 NOTE — PROGRESS NOTES
[id number] [facility] Progress Note    William Horton Patient Status:  Observation    10/5/1965 MRN [id number]   [facility] 4NW-A Attending Naga Wen MD   Hosp Day # 0 PCP Kylee Alberts MD         Subjective:   Prudence Hall Minimal prominence of right temporal horn is questionable. Hippocampi overall relatively symmetric. 5. Minimal punctate FLAIR abnormalities the white matter are likely stable. This may be sequelae minimal chronic small vessel ischemic disease.   These ca body pains  Auditory and olfactory hallucinations  Thyroid dysfunction    Discussed recording of any spells she may have overnight of olfactory, auditory or taste hallucinations, so we can correlate with EEG  cEEG until morning  Appreciate endocrinology an

## 2020-01-28 VITALS
DIASTOLIC BLOOD PRESSURE: 69 MMHG | HEIGHT: 62 IN | RESPIRATION RATE: 18 BRPM | TEMPERATURE: 98 F | HEART RATE: 70 BPM | SYSTOLIC BLOOD PRESSURE: 98 MMHG | WEIGHT: 116.19 LBS | OXYGEN SATURATION: 98 % | BODY MASS INDEX: 21.38 KG/M2

## 2020-01-28 PROCEDURE — 95718 EEG PHYS/QHP 2-12 HR W/VEEG: CPT | Performed by: OTHER

## 2020-01-28 PROCEDURE — 99217 OBSERVATION CARE DISCHARGE: CPT | Performed by: HOSPITALIST

## 2020-01-28 PROCEDURE — 99214 OFFICE O/P EST MOD 30 MIN: CPT | Performed by: OTHER

## 2020-01-28 RX ORDER — PROCHLORPERAZINE EDISYLATE 5 MG/ML
10 INJECTION INTRAMUSCULAR; INTRAVENOUS EVERY 6 HOURS PRN
Status: DISCONTINUED | OUTPATIENT
Start: 2020-01-28 | End: 2020-01-28

## 2020-01-28 RX ORDER — PROCHLORPERAZINE MALEATE 10 MG
10 TABLET ORAL EVERY 6 HOURS PRN
Qty: 30 TABLET | Refills: 0 | Status: SHIPPED | OUTPATIENT
Start: 2020-01-28 | End: 2020-04-07

## 2020-01-28 RX ORDER — FENTANYL 25 UG/H
1 PATCH TRANSDERMAL
Status: DISCONTINUED | OUTPATIENT
Start: 2020-01-28 | End: 2020-01-28

## 2020-01-28 NOTE — PROGRESS NOTES
[id number] [facility] Progress Note    Leonid Carineenrique Patient Status:  Observation    10/5/1965 MRN [id number]   [facility] 4NW-A Attending Ron Buckner MD   Hosp Day # 0 PCP Iman Estes MD         Subjective:   Lee Hall are likely stable. This may be sequelae minimal chronic small vessel ischemic disease. These can be seen migraine headaches as.   No significant change since prior exam.      Labs:  T3 low, TPO ab elevated  Prior Labs: ESR, CRP, CHEYANNE, B12, monoclonal prote endocrinology and neuropsychology evaluations  Reviewed MRI brain  Stopped empiric solumedrol after 2 doses, per patient request, as she is having more side effects than benefit, and no clear clinical signs of having a recurrent encephalitis, also consider

## 2020-01-28 NOTE — PROGRESS NOTES
Patient states that she had a seizure that lasted about one min states that she heard the music again.

## 2020-01-28 NOTE — PLAN OF CARE
States her stomach is still a little queasy, attempted to eat for lunch but she started to get sick and threw up, her emesis was undigested food, small in amount. Already received Zofran and Pepcid prior to her meal, MD ordered Compazine and was given.  Liv

## 2020-01-28 NOTE — PROCEDURES
ELECTROENCEPHALOGRAM REPORT      Patient Name: Abdias Monteiro   : 10/5/1965   Requesting Physician: Dr. Mike Lorenzo   Date of Test: 20 10:04 until 19 17:04  History: 62year old female with history of autoimmune encephalitis, who has been have

## 2020-01-28 NOTE — PROGRESS NOTES
Patient states that she had a seizure at 1838 that lasted a minute states that she heard rosa santra music in her ears and than it went away.

## 2020-01-29 NOTE — DISCHARGE SUMMARY
[facility]  DISCHARGE SUMMARY     Pranav Lam Patient Status:  Observation    10/5/1965 MRN [id number]   [facility] 4NW-A Attending No att. providers found   Hosp Day # 0 PCP Yehuda Weems MD     Date of Admission: 2020  Da so well\". In her appreciate her pre-existing cognitive strengths the intensity of her personality and will to be driven was an adaptive quality in her career but now can lead to rumination and over focusing on small details that should be overlooked.   He was essentially negative. Tried on IV steroids but pt could not tolerate. Had a minimal benefit but steroids made her \"hyper\" and unable to sleep. Some TSH slightly abnormal, endo recommended decrease in dose although pt refused.  Neuro psych eval complet [id number]    Phone:  [phone]   · Prochlorperazine Maleate 10 mg tablet         [place]    Follow-up appointment:   Deepa Up, [address] [place] [address] [phone]    Schedule an appointment as soon as p

## 2020-02-13 ENCOUNTER — TELEPHONE (OUTPATIENT)
Dept: NEUROLOGY | Facility: CLINIC | Age: 55
End: 2020-02-13

## 2020-02-13 NOTE — TELEPHONE ENCOUNTER
Pt has two questions for Dr. Stephen Starks:  1. Should pt still go back to [facility]? 2. Does he recommend proceeding with additional steroid infusions? Her son's wedding is in 4 weeks, she's trying to obtain her best possible recovery.

## 2020-02-14 NOTE — TELEPHONE ENCOUNTER
Follow up in office to discuss; if she is still concerned about encephalitis, would recommend follow up [facility] for second opinion

## 2020-02-14 NOTE — TELEPHONE ENCOUNTER
Talked with Mercy Perez, notified her that Dr Stephon Avendano would like to discuss in detail at a follow up apt. Patient will schedule a follow up apt with him. Patient verbalized the understanding.

## 2020-02-21 ENCOUNTER — TELEPHONE (OUTPATIENT)
Dept: FAMILY MEDICINE CLINIC | Facility: CLINIC | Age: 55
End: 2020-02-21

## 2020-02-21 DIAGNOSIS — G89.4 CHRONIC PAIN SYNDROME: Primary | ICD-10-CM

## 2020-02-21 NOTE — TELEPHONE ENCOUNTER
Patient called states its been a year since last time saw the pain specialist, wants to know with Medicare if needs another referral or if can call office to schedule?

## 2020-02-21 NOTE — TELEPHONE ENCOUNTER
Patient was last seen by Dr. Mari Young M.D. 10/30/19. She was last referred to Dr. Seth Ash M.D. 10/2019. Okay to refer again for chronic pain syndrome?

## 2020-02-25 NOTE — TELEPHONE ENCOUNTER
Referral request Dr. Gabriella Young M.D., Pain management    Patient seen by Dr. Dinah Clark M.D. 10/30/2019  Office notes from Carmina Alpers, M.D. 10/30/2019  This is a 62year old female coming in for follow up fatigue, off stimulants 3 days, adderall and mo

## 2020-02-27 ENCOUNTER — TELEPHONE (OUTPATIENT)
Dept: FAMILY MEDICINE CLINIC | Facility: CLINIC | Age: 55
End: 2020-02-27

## 2020-02-27 ENCOUNTER — OFFICE VISIT (OUTPATIENT)
Dept: NEUROLOGY | Facility: CLINIC | Age: 55
End: 2020-02-27
Payer: MEDICARE

## 2020-02-27 VITALS
WEIGHT: 116 LBS | HEIGHT: 62 IN | HEART RATE: 83 BPM | RESPIRATION RATE: 16 BRPM | DIASTOLIC BLOOD PRESSURE: 73 MMHG | SYSTOLIC BLOOD PRESSURE: 124 MMHG | BODY MASS INDEX: 21.35 KG/M2

## 2020-02-27 DIAGNOSIS — G89.4 CHRONIC PAIN SYNDROME: ICD-10-CM

## 2020-02-27 DIAGNOSIS — E06.3 HASHIMOTO'S THYROIDITIS: ICD-10-CM

## 2020-02-27 DIAGNOSIS — G47.10 HYPERSOMNIA: ICD-10-CM

## 2020-02-27 DIAGNOSIS — G04.81: Primary | ICD-10-CM

## 2020-02-27 PROCEDURE — 99214 OFFICE O/P EST MOD 30 MIN: CPT | Performed by: OTHER

## 2020-02-27 RX ORDER — HYDROCODONE BITARTRATE AND ACETAMINOPHEN 5; 325 MG/1; MG/1
1 TABLET ORAL EVERY 8 HOURS PRN
Qty: 20 TABLET | Refills: 0 | Status: SHIPPED | OUTPATIENT
Start: 2020-02-27 | End: 2020-03-17

## 2020-02-27 NOTE — TELEPHONE ENCOUNTER
Patient is requesting her Norco to be refilled to [facility] in [place] patient is completely out of the medication and she was in the hospital and has a visiting physician and he did not show up.  She has the patches but not the pill form please call with qu

## 2020-02-27 NOTE — PROGRESS NOTES
Elizabeth Progress Note    HPI  As per my initial H&P from 3/26/15:  Patient presents with:  Neurologic Problem:  Follow up on Encephalitis      \"Mini Stauffer is a 44year old, who presents for evaluation of fatigue, and pain, with standing, but has not had any in several years. She currently is also having issues with low back pain, and admits to having issues with standing for a prolonged period. She states she notes numbness in both of her legs, but worse in the lower legs.  She 5/24/2016    Performed by Victorina Whiting MD at [facility] ENDOSCOPY   • OTHER  2016    central line placement     Family History   Problem Relation Age of Onset   • Cancer Father         prostate   • Heart Disease Father    • Heart Disorder Maternal Grandfathe Disp: 20 tablet, Rfl: 0    Review of Systems:   no chest pain or palpitations; otherwise as noted in HPI    Exam:  /73 (BP Location: Left arm, Patient Position: Sitting, Cuff Size: adult)   Pulse 83   Resp 16   Ht 62\"   Wt 116 lb (52.6 kg)   BMI 21. KAPPA/LAMBDA FLC RATIO      0.26-1.65 1.24   TOTAL VOLUME CSF       11.0   COUNT PERFORMED ON TUBE       4   COLOR CSF      Colorless Colorless   CLARITY CSF      Clear Clear   WBC CSF      0-5 /mm3 1   RBC CSF       0   Vitamin B12      193-986 pg/mL 13 prominence of right temporal horn is questionable. Hippocampi overall relatively symmetric. 5. Minimal punctate FLAIR abnormalities the white matter are likely stable. This may be sequelae minimal chronic small vessel ischemic disease.   These can be FINDINGS:     The ventricles and sulci are within normal limits.  There is no midline shift or mass effect.  The basal cisterns are patent.         There is no acute intracranial hemorrhage or extra-axial fluid collection identified.  Minimal patchy areas are present.  =====  CONCLUSION:   1. No acute intracranial abnormality identified. 2. Trace chronic microvascular ischemic changes in the cerebral white matter. MRI lumbar spine with and without contrast (4/9/15)  :   FINDINGS:   Lower lumbar levo s electrographic, epileptiform or ictal activity. There was no   video correlate as well. XLTEK EVENTS: There numerous Xltek events, which did not   correlate with any epileptiform or ictal activity.     INTERICTAL EPILEPTIFORM ACTIVITY: None  ICTAL ACTI component here impacting cognition.   Her quick description of memory problems visual spatial difficulty coordination difficulty word finding difficulty is entirely unfounded and is likely part of a traumatic response so stated with her medical condition an intact as well.     Executive functions are intact. On trails A she achieved a score of 10 and yet on trails B she achieved a score of 8. It is important note that she made 2 self corrected errors due to her own impulsivity due to her competitive nature. single moment she is quick to attributed to her medical condition rather than simply not having the word in that moment. Certainly fatigue can be associated with her medical condition but the unrealized depression is likely adding to her fatigue process. EEG was negative. During admission, she stopped her recommended course of IV Solu-Medrol, as she was having GI discomfort.   In addition, she was evaluated by neuropsychology for cognitive testing, and it was found that she had no evidence for objective co to the best of my ability    Rosaura Rausch MD, Neurology  [facility]  Pager [phone]  2/27/2020

## 2020-02-28 NOTE — TELEPHONE ENCOUNTER
Diagnoses and all orders for this visit:    Chronic pain syndrome  -     HYDROcodone-acetaminophen (NORCO) 5-325 MG Oral Tab; Take 1 tablet by mouth every 8 (eight) hours as needed for Pain. OK for #20    OK to notify.  Thanks, Sharyle Field, MD

## 2020-03-02 NOTE — PROGRESS NOTES
Patient presents in office today with reported pain everywhere (back, head, face, teeth, legs, neck). Patient denies numbness and tingling.      Current pain level reported = 7/10

## 2020-03-03 ENCOUNTER — PATIENT MESSAGE (OUTPATIENT)
Dept: FAMILY MEDICINE CLINIC | Facility: CLINIC | Age: 55
End: 2020-03-03

## 2020-03-03 NOTE — TELEPHONE ENCOUNTER
From: Michell Isaacs  To: Gera Toure MD  Sent: 3/3/2020 4:20 PM CST  Subject: Other    Dr. Walter Tucker: I wanted to advise you I have made a decision to discontinue the Fentanyl Patches. It is becoming more and more difficult to find a provider to prescribe them. My current prescription is from the [facility]. I find them to be unreliable in their service. The doctors do not show up for their scheduled visits. Additionally, the last time I needed my prescription, it took 3 weeks before it was finally called into the pharmacy. This included daily calls from me checking the status and informing them I would run/ran out, worries about possible seizure activity etc.. I found this to be very stressful. At one point, I contacted you because I didn't know what to do. My last patch should have been changed on 3/2/20. I did not. I am on 25mcg. MLS

## 2020-03-03 NOTE — PROGRESS NOTES
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  Patient was last seen in office in 2 CAPSULES BY MOUTH EVERY DAY., Disp: 180 capsule, Rfl: 4    No current facility-administered medications on file prior to visit.        EXAM:   /74 (BP Location: Right arm, Patient Position: Sitting, Cuff Size: adult)   Pulse 84   Ht 62\"   Wt 111 lb understanding of these issues and agrees to the plan. Return if symptoms worsen or fail to improve.     AGNIESZKA Lea, 3/3/2020, 8:13 AM

## 2020-03-09 ENCOUNTER — TELEPHONE (OUTPATIENT)
Dept: FAMILY MEDICINE CLINIC | Facility: CLINIC | Age: 55
End: 2020-03-09

## 2020-03-09 DIAGNOSIS — G89.4 CHRONIC PAIN SYNDROME: ICD-10-CM

## 2020-03-09 NOTE — TELEPHONE ENCOUNTER
Pt is calling requesting a refill on HYDROcodone-acetaminophen 5-325 MG Oral Tab. Told pt appointment is needed for this type of med refill. Pt states she is receiving home health services.     Preferred pharmacy [facility]

## 2020-03-10 NOTE — TELEPHONE ENCOUNTER
2/27/2020 was an emergency 10 day refill, but was not meant to be recurrent. She needs to be seen to assess long term options, no refills as no follow up visits.  Thanks, Víctor Gómez MD

## 2020-03-11 NOTE — TELEPHONE ENCOUNTER
Called patient to schedule appointment, no answer/left message on voicemail. Sending 3 tries via [employer]

## 2020-03-11 NOTE — TELEPHONE ENCOUNTER
[address] staff, the three calls need to be done over three days, not all within one hour

## 2020-03-16 ENCOUNTER — PATIENT MESSAGE (OUTPATIENT)
Dept: FAMILY MEDICINE CLINIC | Facility: CLINIC | Age: 55
End: 2020-03-16

## 2020-03-16 NOTE — TELEPHONE ENCOUNTER
From: Donnie Davenport  To: Anastasiia Perez MD  Sent: 3/16/2020 5:35 PM CDT  Subject: Other    Dr Wei Levels: I received notification/letter to schedule an appointment to discuss medical needs/medication refills. Due to the Coronavirus id-19, I am only leaving the house for emergency situations. Currently, I have a cold, with difficulty breathing (my chest is heavy and when I breathe I do not feel like I'm getting enough oxygen), along with a runny nose and sore throat. I do not have a cough. Although I have a slight temperature increase (.9 degree), I do not have a fever. I do not believe I meet the criteria to be tested for the Coronavirus. I have not seen my visiting physician since before my hospitalization in January. I have attempted to schedule appointments. Will you please let me know if I need to be concerned with this cold.

## 2020-03-17 RX ORDER — HYDROCODONE BITARTRATE AND ACETAMINOPHEN 5; 325 MG/1; MG/1
1 TABLET ORAL EVERY 8 HOURS PRN
Qty: 60 TABLET | Refills: 0 | Status: SHIPPED | OUTPATIENT
Start: 2020-03-17 | End: 2020-07-24

## 2020-03-17 NOTE — TELEPHONE ENCOUNTER
Please see other [employer] communications. Looks like patient sent  Dr. Veronica Gutiérrez a message to let him know she received our messages she needs an appointment. Due to Covid-19 she will not be scheduling at this time.

## 2020-03-17 NOTE — TELEPHONE ENCOUNTER
Please notify:emrgency # 60 for 1 month, will reeval at that time    Requested Prescriptions     Signed Prescriptions Disp Refills   • HYDROcodone-acetaminophen (NORCO) 5-325 MG Oral Tab 60 tablet 0     Sig: Take 1 tablet by mouth every 8 (eight) hours as

## 2020-03-17 NOTE — TELEPHONE ENCOUNTER
From: Lynette Monroy  To: Marcus Bah MD  Sent: 3/16/2020 6:27 PM CDT  Subject: Other    Dr Josseline Beaulieu: I failed to mention something in my previous communication. Six weeks ago, I received two treatments of Solumedrol. Each treatment was 1000mg. The treatments were 24 hours apart. Thank you.   Francisco De La Cruz

## 2020-04-07 ENCOUNTER — TELEMEDICINE (OUTPATIENT)
Dept: FAMILY MEDICINE CLINIC | Facility: CLINIC | Age: 55
End: 2020-04-07

## 2020-04-07 ENCOUNTER — PATIENT MESSAGE (OUTPATIENT)
Dept: FAMILY MEDICINE CLINIC | Facility: CLINIC | Age: 55
End: 2020-04-07

## 2020-04-07 VITALS — TEMPERATURE: 100 F

## 2020-04-07 DIAGNOSIS — G89.4 CHRONIC PAIN SYNDROME: ICD-10-CM

## 2020-04-07 DIAGNOSIS — F33.0 MILD RECURRENT MAJOR DEPRESSION (HCC): ICD-10-CM

## 2020-04-07 DIAGNOSIS — G04.81: ICD-10-CM

## 2020-04-07 DIAGNOSIS — R05.9 COUGH: Primary | ICD-10-CM

## 2020-04-07 DIAGNOSIS — E06.3 HASHIMOTO'S THYROIDITIS: ICD-10-CM

## 2020-04-07 PROCEDURE — 99214 OFFICE O/P EST MOD 30 MIN: CPT | Performed by: FAMILY MEDICINE

## 2020-04-07 RX ORDER — LEVOTHYROXINE SODIUM 0.1 MG/1
100 TABLET ORAL
Qty: 90 TABLET | Refills: 4 | Status: SHIPPED | OUTPATIENT
Start: 2020-04-07 | End: 2021-10-08

## 2020-04-07 RX ORDER — AZITHROMYCIN 250 MG/1
TABLET, FILM COATED ORAL
Qty: 6 TABLET | Refills: 0 | Status: SHIPPED | OUTPATIENT
Start: 2020-04-07 | End: 2020-04-12

## 2020-04-07 RX ORDER — TRAZODONE HYDROCHLORIDE 100 MG/1
TABLET ORAL
COMMUNITY
Start: 2020-03-26 | End: 2020-10-22

## 2020-04-07 RX ORDER — PREDNISONE 10 MG/1
TABLET ORAL
Qty: 40 TABLET | Refills: 0 | Status: SHIPPED | OUTPATIENT
Start: 2020-04-07 | End: 2020-05-27 | Stop reason: ALTCHOICE

## 2020-04-07 RX ORDER — ATORVASTATIN CALCIUM 10 MG/1
TABLET, FILM COATED ORAL
COMMUNITY
Start: 2020-02-02 | End: 2020-07-24

## 2020-04-07 NOTE — PROGRESS NOTES
Subjective     HPI:   William Horton is a 62year [facility]   This visit is conducted using Telemedicine with live, interactive video and audio. I still have this flu. It's been a month now.  I am having diffic Primary    Relevant Medications    predniSONE 10 MG Oral Tab    azithromycin (ZITHROMAX Z-CAMRYN) 250 MG Oral Tab        Reinforced healthy diet, lifestyle, and exercise.   Severe vough x 4 weeks, but not following a COVID pattern, still with intermitten fever

## 2020-04-07 NOTE — PATIENT INSTRUCTIONS
Mucinex 600 twice daily to loosen mucous. Take as needed. Store brand guaifenesin is OK as well. Zpack  Prednisone for at least 5 days, but taper if no significant improvement by day 3. If significant improvement, 5 days should be enough.      Let me kn

## 2020-04-09 ENCOUNTER — PATIENT MESSAGE (OUTPATIENT)
Dept: FAMILY MEDICINE CLINIC | Facility: CLINIC | Age: 55
End: 2020-04-09

## 2020-04-10 ENCOUNTER — TELEPHONE (OUTPATIENT)
Dept: FAMILY MEDICINE CLINIC | Facility: CLINIC | Age: 55
End: 2020-04-10

## 2020-04-10 DIAGNOSIS — R05.9 COUGH: Primary | ICD-10-CM

## 2020-04-10 PROCEDURE — 99442 PHONE E/M BY PHYS 11-20 MIN: CPT | Performed by: FAMILY MEDICINE

## 2020-04-10 NOTE — TELEPHONE ENCOUNTER
Virtual Telephone Check-In    Juan Luis Hayden verbally consents to a Virtual/Telephone Check-In visit on 04/10/20.  LMTCB, 4/10/2020 1:26 PM      Patient understands and accepts financial responsibility for any deductible, co-insurance and/or co-pays assoc

## 2020-04-10 NOTE — TELEPHONE ENCOUNTER
From: Qi Martin  To: Davi Hernandez MD  Sent: 4/9/2020 5:39 PM CDT  [facility],   It has been 48 hours. I have not seen improvement in my symptoms. I am having difficulty breathing.-shortness of breath, fever, cough, sweating and chest pain. Today I have a headache, as well. I wake from my sleep and I cannot catch my breath. Please advise. Thank you for your time.   Karissa Irwin

## 2020-04-10 NOTE — TELEPHONE ENCOUNTER
Regarding: Visit Follow-up Question  Contact: [phone]  ----- Message from Al Martin RN sent at 4/10/2020  8:20 AM CDT -----       ----- Message from Osiris Winters to To Tinajero MD sent at 4/9/2020  5:39 PM -----   Hi Dr. Phillip Jones,   It has be

## 2020-04-11 ENCOUNTER — NURSE ONLY (OUTPATIENT)
Dept: FAMILY MEDICINE CLINIC | Facility: CLINIC | Age: 55
End: 2020-04-11
Payer: MEDICARE

## 2020-04-11 DIAGNOSIS — R05.9 COUGH: Primary | ICD-10-CM

## 2020-04-11 DIAGNOSIS — G04.81: ICD-10-CM

## 2020-04-11 PROCEDURE — 99442 PHONE E/M BY PHYS 11-20 MIN: CPT | Performed by: FAMILY MEDICINE

## 2020-04-11 NOTE — PROGRESS NOTES
Virtual Check-In    Leonid Galindo verbally consents to a [employer] on 04/11/20. Patient understands and accepts financial responsibility for any deductible, co-insurance and/or co-pays associated with this service.     Duration of the ser

## 2020-04-13 ENCOUNTER — LAB ENCOUNTER (OUTPATIENT)
Dept: LAB | Facility: HOSPITAL | Age: 55
End: 2020-04-13
Attending: FAMILY MEDICINE
Payer: MEDICARE

## 2020-04-13 ENCOUNTER — VIRTUAL PHONE E/M (OUTPATIENT)
Dept: FAMILY MEDICINE CLINIC | Facility: CLINIC | Age: 55
End: 2020-04-13
Payer: MEDICARE

## 2020-04-13 DIAGNOSIS — Z20.822 SUSPECTED COVID-19 VIRUS INFECTION: ICD-10-CM

## 2020-04-13 DIAGNOSIS — G04.81: ICD-10-CM

## 2020-04-13 DIAGNOSIS — R05.9 COUGH: Primary | ICD-10-CM

## 2020-04-13 DIAGNOSIS — R05.9 COUGH: ICD-10-CM

## 2020-04-13 PROCEDURE — 99442 PHONE E/M BY PHYS 11-20 MIN: CPT | Performed by: FAMILY MEDICINE

## 2020-04-13 RX ORDER — ALBUTEROL SULFATE 90 UG/1
2 AEROSOL, METERED RESPIRATORY (INHALATION) EVERY 6 HOURS PRN
Qty: 1 INHALER | Refills: 2 | Status: SHIPPED | OUTPATIENT
Start: 2020-04-13 | End: 2020-07-22

## 2020-04-13 NOTE — PROGRESS NOTES
Virtual/Telephone Check-In    Nick Mcnulty verbally consents to a Virtual/Telephone Check-In service on 4/13/2020. Patient understands and accepts financial responsibility for any deductible, co-insurance and/or co-pays associated with this service.

## 2020-04-15 ENCOUNTER — VIRTUAL PHONE E/M (OUTPATIENT)
Dept: FAMILY MEDICINE CLINIC | Facility: CLINIC | Age: 55
End: 2020-04-15
Payer: MEDICARE

## 2020-04-15 DIAGNOSIS — F33.0 MILD RECURRENT MAJOR DEPRESSION (HCC): Chronic | ICD-10-CM

## 2020-04-15 DIAGNOSIS — J01.40 ACUTE NON-RECURRENT PANSINUSITIS: ICD-10-CM

## 2020-04-15 DIAGNOSIS — R05.9 COUGH: Primary | ICD-10-CM

## 2020-04-15 DIAGNOSIS — D80.1 HYPOGAMMAGLOBULINEMIA, ACQUIRED (HCC): ICD-10-CM

## 2020-04-15 PROCEDURE — 99442 PHONE E/M BY PHYS 11-20 MIN: CPT | Performed by: FAMILY MEDICINE

## 2020-04-15 RX ORDER — CEFUROXIME AXETIL 250 MG/1
250 TABLET ORAL 2 TIMES DAILY
Qty: 20 TABLET | Refills: 0 | Status: SHIPPED | OUTPATIENT
Start: 2020-04-15 | End: 2020-04-25

## 2020-04-15 NOTE — PROGRESS NOTES
LMTCB, 4/15/2020 12:43 PM    Virtual/Telephone Check-In    Nabeel Garrett verbally consents to a Virtual/Telephone Check-In service on 4/15/2020.   Patient understands and accepts financial responsibility for any deductible, co-insurance and/or co-pays as

## 2020-04-24 ENCOUNTER — PATIENT MESSAGE (OUTPATIENT)
Dept: FAMILY MEDICINE CLINIC | Facility: CLINIC | Age: 55
End: 2020-04-24

## 2020-04-27 ENCOUNTER — TELEPHONE (OUTPATIENT)
Dept: FAMILY MEDICINE CLINIC | Facility: CLINIC | Age: 55
End: 2020-04-27

## 2020-04-27 DIAGNOSIS — G89.4 CHRONIC PAIN SYNDROME: Primary | ICD-10-CM

## 2020-04-27 PROCEDURE — 99442 PHONE E/M BY PHYS 11-20 MIN: CPT | Performed by: FAMILY MEDICINE

## 2020-04-27 NOTE — TELEPHONE ENCOUNTER
Mykel Gray MD 4/25/2020 8:38 AM CDT    I imagine she would need a virtual visit, but I thought I would let you make that call    ----- Message -----  From: John Wright  Sent: 4/24/2020 8:55 PM CDT  To: [facility] Clinical Staff  Subject: Prescription Question     Hi Dr Alicia Corrales: I would like to request a prescription for Norco, if possible. Thank you.   Jeramy Salmon

## 2020-04-27 NOTE — TELEPHONE ENCOUNTER
----- Message from Andrew Floyd MD sent at 4/25/2020  8:38 AM CDT -----  Regarding: FW: Prescription Question  Contact: [phone]  I imagine she would need a virtual visit, but I thought I would let you make that call    ----- Message -----  From: SOHAIL

## 2020-04-27 NOTE — TELEPHONE ENCOUNTER
Virtual Telephone Check-In    Johnson Nomi verbally consents to a Virtual/Telephone Check-In visit on 04/27/20. aswered at 4:13 PM    Patient understands and accepts financial responsibility for any deductible, co-insurance and/or co-pays associated wi

## 2020-04-28 ENCOUNTER — TELEPHONE (OUTPATIENT)
Dept: FAMILY MEDICINE CLINIC | Facility: CLINIC | Age: 55
End: 2020-04-28

## 2020-04-28 RX ORDER — FENTANYL 25 UG/H
1 PATCH TRANSDERMAL
Qty: 10 PATCH | Refills: 0 | Status: SHIPPED | OUTPATIENT
Start: 2020-04-28 | End: 2020-05-28

## 2020-04-28 NOTE — TELEPHONE ENCOUNTER
Spoke to pt who informed me that Dr. Nae Hillman told the pt if she had not received her medication for fentaNYL 25 MCG/HR Transdermal Patch 72 Hr to contact the office.   The pt has not received the fentaNYL 25 MCG/HR Transdermal Patch 72 Hr and would like this s

## 2020-04-28 NOTE — TELEPHONE ENCOUNTER
Please notify:- this was documented in yesterday's virtual visit    Requested Prescriptions     Signed Prescriptions Disp Refills   • fentaNYL 25 MCG/HR Transdermal Patch 72 Hr 10 patch 0     Sig: Place 1 patch onto the skin every third day.      Sangita Somers

## 2020-05-07 ENCOUNTER — PATIENT MESSAGE (OUTPATIENT)
Dept: FAMILY MEDICINE CLINIC | Facility: CLINIC | Age: 55
End: 2020-05-07

## 2020-05-08 ENCOUNTER — PATIENT MESSAGE (OUTPATIENT)
Dept: FAMILY MEDICINE CLINIC | Facility: CLINIC | Age: 55
End: 2020-05-08

## 2020-05-08 ENCOUNTER — VIRTUAL PHONE E/M (OUTPATIENT)
Dept: FAMILY MEDICINE CLINIC | Facility: CLINIC | Age: 55
End: 2020-05-08
Payer: MEDICARE

## 2020-05-08 DIAGNOSIS — J41.0 SIMPLE CHRONIC BRONCHITIS (HCC): Primary | ICD-10-CM

## 2020-05-08 PROCEDURE — 99442 PHONE E/M BY PHYS 11-20 MIN: CPT | Performed by: FAMILY MEDICINE

## 2020-05-08 RX ORDER — PREDNISONE 10 MG/1
10 TABLET ORAL 2 TIMES DAILY
Qty: 10 TABLET | Refills: 0 | Status: SHIPPED | OUTPATIENT
Start: 2020-05-08 | End: 2020-05-13

## 2020-05-08 RX ORDER — LEVOFLOXACIN 500 MG/1
500 TABLET, FILM COATED ORAL DAILY
Qty: 7 TABLET | Refills: 0 | Status: SHIPPED | OUTPATIENT
Start: 2020-05-08 | End: 2020-05-15

## 2020-05-08 RX ORDER — FLUTICASONE PROPIONATE AND SALMETEROL 250; 50 UG/1; UG/1
1 POWDER RESPIRATORY (INHALATION) EVERY 12 HOURS SCHEDULED
Qty: 1 EACH | Refills: 1 | Status: SHIPPED | OUTPATIENT
Start: 2020-05-08 | End: 2020-05-11

## 2020-05-08 NOTE — TELEPHONE ENCOUNTER
Patient called back and scheduled virtual visit for 05/08/20.  Did not want video visit as she feels very sick and felt it would take too long to set up

## 2020-05-08 NOTE — TELEPHONE ENCOUNTER
Called patient and left message on machine to contact the office and schedule virtual or video visit

## 2020-05-08 NOTE — PROGRESS NOTES
Virtual/Telephone Check-In    Obinna Lechuga verbally consents to a Virtual/Telephone Check-In service on 05/08/20. Patient understands and accepts financial responsibility for any deductible, co-insurance and/or co-pays associated with this service. predniSONE 10 MG Oral Tab    fluticasone-salmeterol (ADVAIR DISKUS) 250-50 MCG/DOSE Inhalation Aerosol Powder, Breath Activated    levofloxacin (LEVAQUIN) 500 MG Oral Tab         11 minutes spent with patient on this call and chart review and hx review  Re

## 2020-05-11 ENCOUNTER — TELEPHONE (OUTPATIENT)
Dept: FAMILY MEDICINE CLINIC | Facility: CLINIC | Age: 55
End: 2020-05-11

## 2020-05-11 ENCOUNTER — PATIENT MESSAGE (OUTPATIENT)
Dept: FAMILY MEDICINE CLINIC | Facility: CLINIC | Age: 55
End: 2020-05-11

## 2020-05-11 DIAGNOSIS — J41.0 SIMPLE CHRONIC BRONCHITIS (HCC): Primary | ICD-10-CM

## 2020-05-11 DIAGNOSIS — R05.9 COUGH: ICD-10-CM

## 2020-05-11 RX ORDER — CYCLOBENZAPRINE HCL 10 MG
10 TABLET ORAL 3 TIMES DAILY PRN
Qty: 15 TABLET | Refills: 0 | Status: SHIPPED | OUTPATIENT
Start: 2020-05-11 | End: 2022-02-23

## 2020-05-11 NOTE — TELEPHONE ENCOUNTER
Per insurance preferred med is Luis Schwartz  Do you want to change or proceed with Prior Authorization on [place] PENDING for consideration

## 2020-05-11 NOTE — TELEPHONE ENCOUNTER
Diagnoses and all orders for this visit:    Simple chronic bronchitis (HCC)  -     Fluticasone Furoate-Vilanterol (BREO ELLIPTA) 200-25 MCG/INH Inhalation Aerosol Powder, Breath Activated; Inhale 1 puff into the lungs daily.     Cough  -     Fluticasone Fur

## 2020-05-11 NOTE — TELEPHONE ENCOUNTER
Patient called with an update, had a virtual visit with Dr. Gaffney Ear 05/08/20. Patient is still having difficulty breathing, heavy chest,  Soaking through her clothes and bedding, breathing is heavy like she went for a run.     Patient also states she threw out

## 2020-05-11 NOTE — TELEPHONE ENCOUNTER
LMB, 5/11/2020 5:55 PM     OK for Breo, ok for flexeril. Follow up Wednesday to let me know how she is doing.

## 2020-05-11 NOTE — TELEPHONE ENCOUNTER
Received incoming fax from [address] [place] [address] requesting a prior authorization for Phuc Potts 250/50MCG 62s. Lm explaining process to pt.  Fax in Pat's office

## 2020-05-12 NOTE — TELEPHONE ENCOUNTER
From: Dominic Suarez  To: Suzanne Jauregui MD  Sent: 5/11/2020 7:47 PM CDT  Subject: Visit Follow-up Question    Hi Dr Casper Lundberg: On Thursday, I picked up the prescription for Zygmunt Her, right after you sent it to the pharmacy. I have been using it for 5 days. I won't  the prescription for the [facility], as it is not needed. The Flexeril says to take one pill every 8 hours, which I've done. I have two pills left but hopefully I'll see some improvement by then. Thank you.   Lopez Peres

## 2020-05-15 ENCOUNTER — PATIENT MESSAGE (OUTPATIENT)
Dept: FAMILY MEDICINE CLINIC | Facility: CLINIC | Age: 55
End: 2020-05-15

## 2020-05-15 NOTE — TELEPHONE ENCOUNTER
From: Jt Yeboah  To: Margoth Gibbons MD  Sent: 5/15/2020 2:35 PM CDT  Subject: Other    Hi Dr. Kaden Crooks had requested we speak on Wednesday. I dropped the ball. As of date, I'm still running a slight fever (2 degrees). I keep soaking through my clothes (Day & Night). The breathing is better, but still mildly labored. I have some slight heaviness on my chest. The cough is almost gone. I have a sore throat, which I believe is due from the Advair? I did rinse my mouth, after each use. Thank you.    Yuliya Cruz

## 2020-05-18 ENCOUNTER — PATIENT MESSAGE (OUTPATIENT)
Dept: FAMILY MEDICINE CLINIC | Facility: CLINIC | Age: 55
End: 2020-05-18

## 2020-05-19 DIAGNOSIS — F33.0 MILD RECURRENT MAJOR DEPRESSION (HCC): Chronic | ICD-10-CM

## 2020-05-19 RX ORDER — VENLAFAXINE HYDROCHLORIDE 150 MG/1
CAPSULE, EXTENDED RELEASE ORAL
Qty: 180 CAPSULE | Refills: 3 | Status: SHIPPED | OUTPATIENT
Start: 2020-05-19 | End: 2021-04-15

## 2020-05-19 NOTE — TELEPHONE ENCOUNTER
Refill request for:    Requested Prescriptions     Pending Prescriptions Disp Refills   • VENLAFAXINE HCL  MG Oral Capsule SR 24 Hr [Pharmacy Med Name: VENLAFAXINE 150MG ER CAPSULES] 180 capsule 4     Sig: TAKE 2 CAPSULES BY MOUTH [facility]

## 2020-05-26 RX ORDER — FENTANYL 25 UG/H
1 PATCH TRANSDERMAL
Qty: 10 PATCH | Refills: 0 | OUTPATIENT
Start: 2020-05-26 | End: 2020-06-25

## 2020-05-27 ENCOUNTER — VIRTUAL PHONE E/M (OUTPATIENT)
Dept: FAMILY MEDICINE CLINIC | Facility: CLINIC | Age: 55
End: 2020-05-27
Payer: MEDICARE

## 2020-05-27 DIAGNOSIS — E06.3 CHRONIC LYMPHOCYTIC THYROIDITIS: Chronic | ICD-10-CM

## 2020-05-27 DIAGNOSIS — F90.0 ADHD (ATTENTION DEFICIT HYPERACTIVITY DISORDER), INATTENTIVE TYPE: Chronic | ICD-10-CM

## 2020-05-27 DIAGNOSIS — G89.29 CHRONIC MIDLINE LOW BACK PAIN WITHOUT SCIATICA: ICD-10-CM

## 2020-05-27 DIAGNOSIS — M79.672 HEEL PAIN, BILATERAL: ICD-10-CM

## 2020-05-27 DIAGNOSIS — G89.4 CHRONIC PAIN SYNDROME: Primary | ICD-10-CM

## 2020-05-27 DIAGNOSIS — M54.50 CHRONIC MIDLINE LOW BACK PAIN WITHOUT SCIATICA: ICD-10-CM

## 2020-05-27 DIAGNOSIS — E78.2 MIXED HYPERLIPIDEMIA: Chronic | ICD-10-CM

## 2020-05-27 DIAGNOSIS — M79.671 HEEL PAIN, BILATERAL: ICD-10-CM

## 2020-05-27 PROCEDURE — 99442 PHONE E/M BY PHYS 11-20 MIN: CPT | Performed by: FAMILY MEDICINE

## 2020-05-27 RX ORDER — FENTANYL 25 UG/H
1 PATCH TRANSDERMAL
Qty: 10 PATCH | Refills: 0 | Status: SHIPPED | OUTPATIENT
Start: 2020-07-28 | End: 2020-11-18

## 2020-05-27 RX ORDER — FENTANYL 25 UG/H
1 PATCH TRANSDERMAL
Qty: 10 PATCH | Refills: 0 | Status: SHIPPED | OUTPATIENT
Start: 2020-06-27 | End: 2020-07-24

## 2020-05-27 RX ORDER — FENTANYL 25 UG/H
1 PATCH TRANSDERMAL
Qty: 10 PATCH | Refills: 0 | Status: SHIPPED | OUTPATIENT
Start: 2020-05-27 | End: 2020-07-24

## 2020-05-27 NOTE — PROGRESS NOTES
Virtual/Telephone Check-In    Daphnie Mcclain verbally consents to a Virtual/Telephone Check-In service on 05/27/20. Patient has been referred to the [facility] website at [email or web address] to review the yearly Consent to Treat document.   Patient unders (Chronic)    Overview     Dx at Replaced by [facility] in about 2005         Relevant Orders    ASSAY, THYROID STIM HORMONE    FREE T4 (FREE THYROXINE)    FREE T3 (TRIIODOTHYRONINE)    COMP METABOLIC PANEL (14)    CBC WITH DIFFERENTIAL WITH PLATELET       Neurologic

## 2020-05-27 NOTE — PATIENT INSTRUCTIONS
Calf Raise (Strength)    1. Stand up straight with both feet flat on the floor, slightly apart. Hold onto a sturdy chair, railing, counter, or table. 2. Raise both heels so you’re standing on the balls of your feet.  Don’t lock your knees or arch your ba This exercise both stretches and strengthens your lower body to help your back. Do the exercise as often as suggested by your healthcare provider. As you work out, don’t rush or strain.  Use an exercise mat, pillow, or folded towel to protect your knees and The following flexibility exercise may be suggested by your physical therapist. Stop the exercise if it causes pain and discuss it with your physical therapist or healthcare provider. During the exercise, be sure not to bounce.   Here are the steps to the s

## 2020-06-25 DIAGNOSIS — G89.4 CHRONIC PAIN SYNDROME: ICD-10-CM

## 2020-06-25 RX ORDER — HYDROCODONE BITARTRATE AND ACETAMINOPHEN 5; 325 MG/1; MG/1
1 TABLET ORAL EVERY 8 HOURS PRN
Qty: 60 TABLET | Refills: 0 | OUTPATIENT
Start: 2020-06-25 | End: 2020-07-25

## 2020-06-25 NOTE — TELEPHONE ENCOUNTER
Refill request for:    Requested Prescriptions     Pending Prescriptions Disp Refills   • HYDROcodone-acetaminophen (NORCO) 5-325 MG Oral Tab 60 tablet 0     Sig: Take 1 tablet by mouth every 8 (eight) hours as needed for Pain.         Last Prescribed Quant

## 2020-06-25 NOTE — TELEPHONE ENCOUNTER
She is on fentanyl, not hydrocodone at this time, denied, just discussed with her 1 month ago and has follow-up appointment in 2 months but at this point were just doing the fentanyl patches

## 2020-07-15 ENCOUNTER — LAB ENCOUNTER (OUTPATIENT)
Dept: LAB | Facility: HOSPITAL | Age: 55
End: 2020-07-15
Attending: RADIOLOGY
Payer: MEDICARE

## 2020-07-15 DIAGNOSIS — E78.2 MIXED HYPERLIPIDEMIA: Chronic | ICD-10-CM

## 2020-07-15 DIAGNOSIS — E06.3 CHRONIC LYMPHOCYTIC THYROIDITIS: ICD-10-CM

## 2020-07-15 LAB
ALBUMIN SERPL-MCNC: 3.9 G/DL (ref 3.4–5)
ALBUMIN/GLOB SERPL: 1.2 {RATIO} (ref 1–2)
ALP LIVER SERPL-CCNC: 55 U/L (ref 46–118)
ALT SERPL-CCNC: 24 U/L (ref 13–56)
ANION GAP SERPL CALC-SCNC: 1 MMOL/L (ref 0–18)
AST SERPL-CCNC: 22 U/L (ref 15–37)
BASOPHILS # BLD AUTO: 0.07 X10(3) UL (ref 0–0.2)
BASOPHILS NFR BLD AUTO: 1.4 %
BILIRUB SERPL-MCNC: 0.3 MG/DL (ref 0.1–2)
BUN BLD-MCNC: 14 MG/DL (ref 7–18)
BUN/CREAT SERPL: 13.1 (ref 10–20)
CALCIUM BLD-MCNC: 9.2 MG/DL (ref 8.5–10.1)
CHLORIDE SERPL-SCNC: 100 MMOL/L (ref 98–112)
CHOLEST SMN-MCNC: 155 MG/DL (ref ?–200)
CO2 SERPL-SCNC: 35 MMOL/L (ref 21–32)
CREAT BLD-MCNC: 1.07 MG/DL (ref 0.55–1.02)
DEPRECATED RDW RBC AUTO: 38.9 FL (ref 35.1–46.3)
EOSINOPHIL # BLD AUTO: 0.09 X10(3) UL (ref 0–0.7)
EOSINOPHIL NFR BLD AUTO: 1.8 %
ERYTHROCYTE [DISTWIDTH] IN BLOOD BY AUTOMATED COUNT: 11.8 % (ref 11–15)
GLOBULIN PLAS-MCNC: 3.2 G/DL (ref 2.8–4.4)
GLUCOSE BLD-MCNC: 96 MG/DL (ref 70–99)
HCT VFR BLD AUTO: 41.8 % (ref 35–48)
HDLC SERPL-MCNC: 76 MG/DL (ref 40–59)
HGB BLD-MCNC: 14.2 G/DL (ref 12–16)
IMM GRANULOCYTES # BLD AUTO: 0.01 X10(3) UL (ref 0–1)
IMM GRANULOCYTES NFR BLD: 0.2 %
LDLC SERPL CALC-MCNC: 60 MG/DL (ref ?–100)
LYMPHOCYTES # BLD AUTO: 1.35 X10(3) UL (ref 1–4)
LYMPHOCYTES NFR BLD AUTO: 27.6 %
M PROTEIN MFR SERPL ELPH: 7.1 G/DL (ref 6.4–8.2)
MCH RBC QN AUTO: 31.1 PG (ref 26–34)
MCHC RBC AUTO-ENTMCNC: 34 G/DL (ref 31–37)
MCV RBC AUTO: 91.5 FL (ref 80–100)
MONOCYTES # BLD AUTO: 0.45 X10(3) UL (ref 0.1–1)
MONOCYTES NFR BLD AUTO: 9.2 %
NEUTROPHILS # BLD AUTO: 2.92 X10 (3) UL (ref 1.5–7.7)
NEUTROPHILS # BLD AUTO: 2.92 X10(3) UL (ref 1.5–7.7)
NEUTROPHILS NFR BLD AUTO: 59.8 %
NONHDLC SERPL-MCNC: 79 MG/DL (ref ?–130)
OSMOLALITY SERPL CALC.SUM OF ELEC: 282 MOSM/KG (ref 275–295)
PATIENT FASTING Y/N/NP: NO
PATIENT FASTING Y/N/NP: NO
PLATELET # BLD AUTO: 219 10(3)UL (ref 150–450)
POTASSIUM SERPL-SCNC: 4.3 MMOL/L (ref 3.5–5.1)
RBC # BLD AUTO: 4.57 X10(6)UL (ref 3.8–5.3)
SODIUM SERPL-SCNC: 136 MMOL/L (ref 136–145)
T3FREE SERPL-MCNC: 2.58 PG/ML (ref 2.4–4.2)
T4 FREE SERPL-MCNC: 1.3 NG/DL (ref 0.8–1.7)
TRIGL SERPL-MCNC: 93 MG/DL (ref 30–149)
TSI SER-ACNC: 0.72 MIU/ML (ref 0.36–3.74)
VLDLC SERPL CALC-MCNC: 19 MG/DL (ref 0–30)
WBC # BLD AUTO: 4.9 X10(3) UL (ref 4–11)

## 2020-07-15 PROCEDURE — 84443 ASSAY THYROID STIM HORMONE: CPT

## 2020-07-15 PROCEDURE — 80061 LIPID PANEL: CPT

## 2020-07-15 PROCEDURE — 84481 FREE ASSAY (FT-3): CPT

## 2020-07-15 PROCEDURE — 80053 COMPREHEN METABOLIC PANEL: CPT

## 2020-07-15 PROCEDURE — 36415 COLL VENOUS BLD VENIPUNCTURE: CPT

## 2020-07-15 PROCEDURE — 84439 ASSAY OF FREE THYROXINE: CPT

## 2020-07-15 PROCEDURE — 85025 COMPLETE CBC W/AUTO DIFF WBC: CPT

## 2020-07-23 NOTE — ASSESSMENT & PLAN NOTE
Stable, Continue present management.     Thyroid  (most recent labs)   Lab Results   Component Value Date/Time    TSH 0.719 07/15/2020 05:10 PM    T4F 1.3 07/15/2020 05:10 PM         Endocrine Medications          Levothyroxine Sodium 100 MCG Oral Tab

## 2020-07-24 ENCOUNTER — OFFICE VISIT (OUTPATIENT)
Dept: FAMILY MEDICINE CLINIC | Facility: CLINIC | Age: 55
End: 2020-07-24
Payer: MEDICARE

## 2020-07-24 VITALS
HEART RATE: 100 BPM | TEMPERATURE: 98 F | SYSTOLIC BLOOD PRESSURE: 120 MMHG | RESPIRATION RATE: 20 BRPM | HEIGHT: 62 IN | WEIGHT: 113 LBS | BODY MASS INDEX: 20.8 KG/M2 | DIASTOLIC BLOOD PRESSURE: 74 MMHG

## 2020-07-24 DIAGNOSIS — T40.2X5A THERAPEUTIC OPIOID-INDUCED CONSTIPATION (OIC): ICD-10-CM

## 2020-07-24 DIAGNOSIS — M54.50 CHRONIC MIDLINE LOW BACK PAIN WITHOUT SCIATICA: ICD-10-CM

## 2020-07-24 DIAGNOSIS — E06.3 CHRONIC LYMPHOCYTIC THYROIDITIS: ICD-10-CM

## 2020-07-24 DIAGNOSIS — G89.29 CHRONIC MIDLINE LOW BACK PAIN WITHOUT SCIATICA: ICD-10-CM

## 2020-07-24 DIAGNOSIS — K59.03 THERAPEUTIC OPIOID-INDUCED CONSTIPATION (OIC): ICD-10-CM

## 2020-07-24 DIAGNOSIS — E78.2 MIXED HYPERLIPIDEMIA: ICD-10-CM

## 2020-07-24 DIAGNOSIS — G89.4 CHRONIC PAIN SYNDROME: Primary | ICD-10-CM

## 2020-07-24 DIAGNOSIS — F90.0 ADHD (ATTENTION DEFICIT HYPERACTIVITY DISORDER), INATTENTIVE TYPE: ICD-10-CM

## 2020-07-24 DIAGNOSIS — E06.3 HASHIMOTO'S THYROIDITIS: ICD-10-CM

## 2020-07-24 PROCEDURE — 99214 OFFICE O/P EST MOD 30 MIN: CPT | Performed by: FAMILY MEDICINE

## 2020-07-24 RX ORDER — NALOXONE HYDROCHLORIDE 4 MG/.1ML
4 SPRAY, METERED NASAL AS NEEDED
Qty: 1 KIT | Refills: 0 | Status: SHIPPED | OUTPATIENT
Start: 2020-07-24

## 2020-07-24 RX ORDER — DEXTROAMPHETAMINE SACCHARATE, AMPHETAMINE ASPARTATE, DEXTROAMPHETAMINE SULFATE AND AMPHETAMINE SULFATE 5; 5; 5; 5 MG/1; MG/1; MG/1; MG/1
20 TABLET ORAL 2 TIMES DAILY
Qty: 60 TABLET | Refills: 0 | Status: SHIPPED | OUTPATIENT
Start: 2020-07-24 | End: 2020-09-21

## 2020-07-24 RX ORDER — DEXTROAMPHETAMINE SACCHARATE, AMPHETAMINE ASPARTATE, DEXTROAMPHETAMINE SULFATE AND AMPHETAMINE SULFATE 5; 5; 5; 5 MG/1; MG/1; MG/1; MG/1
20 TABLET ORAL 2 TIMES DAILY
Qty: 60 TABLET | Refills: 0 | Status: SHIPPED | OUTPATIENT
Start: 2020-08-23 | End: 2020-11-18

## 2020-07-24 RX ORDER — ATORVASTATIN CALCIUM 10 MG/1
10 TABLET, FILM COATED ORAL DAILY
Qty: 90 TABLET | Refills: 3 | Status: SHIPPED | OUTPATIENT
Start: 2020-07-24 | End: 2021-07-14

## 2020-07-24 RX ORDER — HYDROCODONE BITARTRATE AND ACETAMINOPHEN 5; 325 MG/1; MG/1
1 TABLET ORAL EVERY 8 HOURS PRN
Qty: 60 TABLET | Refills: 0 | Status: SHIPPED | OUTPATIENT
Start: 2020-09-22 | End: 2020-10-22

## 2020-07-24 RX ORDER — FENTANYL 25 UG/H
1 PATCH TRANSDERMAL
Qty: 10 PATCH | Refills: 0 | Status: SHIPPED | OUTPATIENT
Start: 2020-08-27 | End: 2020-10-22

## 2020-07-24 RX ORDER — FENTANYL 25 UG/H
1 PATCH TRANSDERMAL
Qty: 10 PATCH | Refills: 0 | Status: SHIPPED | OUTPATIENT
Start: 2020-09-26 | End: 2020-11-18

## 2020-07-24 RX ORDER — HYDROCODONE BITARTRATE AND ACETAMINOPHEN 5; 325 MG/1; MG/1
1 TABLET ORAL EVERY 8 HOURS PRN
Qty: 60 TABLET | Refills: 0 | Status: SHIPPED | OUTPATIENT
Start: 2020-07-24 | End: 2020-08-22 | Stop reason: WASHOUT

## 2020-07-24 RX ORDER — HYDROCODONE BITARTRATE AND ACETAMINOPHEN 5; 325 MG/1; MG/1
1 TABLET ORAL EVERY 8 HOURS PRN
Qty: 60 TABLET | Refills: 0 | Status: SHIPPED | OUTPATIENT
Start: 2020-08-23 | End: 2020-09-21 | Stop reason: WASHOUT

## 2020-07-24 NOTE — PROGRESS NOTES
Patient presents with:  Pain: review going to [facility] and travel  ADD: request going back on adderall for energy      Subjective   HPI:   This is a 62year old female coming in for continuyed hashimotos, so looking at [address] back to May of this.  Fatigue and ciarra place, and time. She has normal strength. Skin: Skin is warm and dry. No rash noted. Psychiatric: She has a normal mood and affect.  Her speech is normal and behavior is normal. Judgment and thought content normal. Cognition and memory are normal. Oral Tab    amphetamine-dextroamphetamine (ADDERALL) 20 MG Oral Tab (Start on 8/23/2020)       Musculoskeletal    Chronic midline low back pain without sciatica    Current Assessment & Plan     Chronic pain.  Continue present management          Relevant Me

## 2020-07-27 ENCOUNTER — TELEPHONE (OUTPATIENT)
Dept: FAMILY MEDICINE CLINIC | Facility: CLINIC | Age: 55
End: 2020-07-27

## 2020-07-27 NOTE — TELEPHONE ENCOUNTER
Received fax from [place], prior authorization required for Relistor. Called patient and left message on machine explaining process.  Fax in triage

## 2020-08-03 NOTE — TELEPHONE ENCOUNTER
[facility] pharmacy called checking status on prior authorization, asking for a call back [phone] since this is a medication that would have to be ordered.

## 2020-08-04 ENCOUNTER — PATIENT MESSAGE (OUTPATIENT)
Dept: FAMILY MEDICINE CLINIC | Facility: CLINIC | Age: 55
End: 2020-08-04

## 2020-08-04 DIAGNOSIS — G04.81: Primary | Chronic | ICD-10-CM

## 2020-08-04 DIAGNOSIS — E06.3 CHRONIC LYMPHOCYTIC THYROIDITIS: Chronic | ICD-10-CM

## 2020-08-04 DIAGNOSIS — G47.10 HYPERSOMNIA: ICD-10-CM

## 2020-08-04 DIAGNOSIS — G89.4 CHRONIC PAIN SYNDROME: ICD-10-CM

## 2020-08-04 NOTE — TELEPHONE ENCOUNTER
From: Leonid Galindo  To: Iman Estes MD  Sent: 8/4/2020 5:18 PM CDT  Subject: Referral Request    Hi Dr Keon Viveros: In order to get an appointment at [facility], I will need a 1-page summary faxed to Neurology [phone] with:  1.) Why I should be seen

## 2020-08-04 NOTE — TELEPHONE ENCOUNTER
From: Harrison Mcdermott  To: Magno Godinez MD  Sent: 8/4/2020 5:47 PM CDT  Subject: Referral Request    In his report he quotes me as saying \"You know me so well. \" I NEVER said that. First, it's ridiculous to say to someone you've known for 10 minutes! Secondly, it's not in my vernacular. After Dr. Bibi Joes saw the evaluation, he dismissed me as a patient, assuming I was having emotional problems. I do not feel this is a fair evaluation. I'm afraid if it's brought to the attention of [facility], they will dismiss me as well. I've been ill for 15 years. My symptoms and difficulties have remained constant throughout those 15 years. I've had all sorts of Neuropsych testing (4 times). It was determined that depression or psychological issues were not contributing to my illness. I just want to be able to get out of bed & function! Thanks!  MLS

## 2020-08-04 NOTE — TELEPHONE ENCOUNTER
1 page summary needs to be faxed to [facility] as to why Yefri Garcias needs to be seen      Routed to [facility]

## 2020-08-04 NOTE — TELEPHONE ENCOUNTER
Initiated PA for Relistor 12mg/0.6ml by accessing [employer] form on CMM. Entered info, pt diagnosis K59.03 and T40.2X5A and answered applicable questions. Sent to plan.   [id number] see ambulance record
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
## 2020-08-04 NOTE — TELEPHONE ENCOUNTER
Daniel Berrios  Approved   Request Reference Number: [id number]. RELISTOR INJ 12/0.6ML is approved through 02/04/2021. For further questions, call [phone].

## 2020-08-17 ENCOUNTER — TELEPHONE (OUTPATIENT)
Dept: FAMILY MEDICINE CLINIC | Facility: CLINIC | Age: 55
End: 2020-08-17

## 2020-08-17 NOTE — TELEPHONE ENCOUNTER
Pt saw Dr. Mayra Cabral on 7/27 and was prescribed Fentanyl and it was refilled on 7/27/20 and the next one says 8/27/20 she will need her August one refilled 8/25/20 to make sure she doesn't go two days without it.  Her neurologist is afraid she may have a seizure

## 2020-08-18 ENCOUNTER — PATIENT OUTREACH (OUTPATIENT)
Dept: FAMILY MEDICINE CLINIC | Facility: CLINIC | Age: 55
End: 2020-08-18

## 2020-08-18 DIAGNOSIS — Z12.31 ENCOUNTER FOR SCREENING MAMMOGRAM FOR MALIGNANT NEOPLASM OF BREAST: Primary | ICD-10-CM

## 2020-08-24 ENCOUNTER — MED REC SCAN ONLY (OUTPATIENT)
Dept: FAMILY MEDICINE CLINIC | Facility: CLINIC | Age: 55
End: 2020-08-24

## 2020-09-21 ENCOUNTER — TELEPHONE (OUTPATIENT)
Dept: FAMILY MEDICINE CLINIC | Facility: CLINIC | Age: 55
End: 2020-09-21

## 2020-09-21 DIAGNOSIS — F90.0 ADHD (ATTENTION DEFICIT HYPERACTIVITY DISORDER), INATTENTIVE TYPE: ICD-10-CM

## 2020-09-21 RX ORDER — DEXTROAMPHETAMINE SACCHARATE, AMPHETAMINE ASPARTATE, DEXTROAMPHETAMINE SULFATE AND AMPHETAMINE SULFATE 5; 5; 5; 5 MG/1; MG/1; MG/1; MG/1
20 TABLET ORAL 2 TIMES DAILY
Qty: 60 TABLET | Refills: 0 | Status: SHIPPED | OUTPATIENT
Start: 2020-09-21 | End: 2020-10-21 | Stop reason: WASHOUT

## 2020-09-21 NOTE — TELEPHONE ENCOUNTER
Diagnoses and all orders for this visit:    ADHD (attention deficit hyperactivity disorder), inattentive type  -     amphetamine-dextroamphetamine (ADDERALL) 20 MG Oral Tab; Take 1 tablet (20 mg total) by mouth 2 (two) times daily. OK to notify.  Romain Bowling

## 2020-09-21 NOTE — TELEPHONE ENCOUNTER
Chart reviewed - Adderall script was not sent for September at 7/24/2020 visit. Pt was advised to f/u in 3 months at that time (10/2020).     Future Appointments   Date Time Provider Milla Garcia   10/23/2020  3:00 PM Jeri Grigsby MD [facility]

## 2020-09-21 NOTE — TELEPHONE ENCOUNTER
Pt states she received meds except of the Aderrall. Can we send to her [facility]?

## 2020-09-22 ENCOUNTER — TELEPHONE (OUTPATIENT)
Dept: FAMILY MEDICINE CLINIC | Facility: CLINIC | Age: 55
End: 2020-09-22

## 2020-09-22 NOTE — TELEPHONE ENCOUNTER
Pt Rx for fentaNYL 25 MCG/HR Transdermal Patch 67 Hr was given the wrong date and last month Dr Theron Sanz corrected the Rx but the one for this month is still  Incorrect. Can we please fix the date that she can  today.  Pt can not go off this medication

## 2020-09-22 NOTE — TELEPHONE ENCOUNTER
Patient states the pharmacy told her that she should refill the medication tomorrow, but that is not correct. She states she put her last patch on today and will need to remove it on Friday (9/25).  This is one day earlier than start date of next script (9/

## 2020-09-22 NOTE — TELEPHONE ENCOUNTER
These WERE the correct dates based on what she had in July. Later she insisted on early refill. Why is it not lasting 10 atches for 30 days? Last refill was for 7.28. 8/27 and 9/26. The math is not adding up.  We let her fill early last time but she was

## 2020-09-23 NOTE — TELEPHONE ENCOUNTER
[id number] Phyllis Lewis for 1 day early, but these are all calculated by the prescription system so she must have verified the wrong refill date at last visit as I do T+30 and T+60, doesn't matter how many days in a month

## 2020-10-07 ENCOUNTER — PATIENT MESSAGE (OUTPATIENT)
Dept: FAMILY MEDICINE CLINIC | Facility: CLINIC | Age: 55
End: 2020-10-07

## 2020-10-07 NOTE — TELEPHONE ENCOUNTER
From: Paola Quevedo  To: Bartolo Garcia MD  Sent: 10/7/2020 4:40 PM CDT  Subject: Non-Urgent Medical Question    Hi Dr Rosario Porras recently seen a provider, Dr. Shelia Preciado, outside of [place] [address]. She is a Naturopathic Doctor. I have attached her visit summary for your review. I have an appointment with you next week. Thank you kindly.     Sky Newsome

## 2020-10-19 ENCOUNTER — PATIENT MESSAGE (OUTPATIENT)
Dept: FAMILY MEDICINE CLINIC | Facility: CLINIC | Age: 55
End: 2020-10-19

## 2020-10-19 NOTE — TELEPHONE ENCOUNTER
From: Javad Guadarrama  To: Albert Ordonez MD  Sent: 10/19/2020 5:07 PM CDT  Subject: Other    Hi Dr. Munoz Clock: Our next appointment is on Thursday, October 22. I question my ability to make it to this appointment. I am in such severe pain,  I cannot walk. The pain is making me vomit. I can't keep food down. I currently weigh 102.8 pounds. Can we set up a teleconference as a back-up plan? My preference would be to come to your office. However, I don't want to miss my appointment.  Thank you kindly,  Henna Arteaga

## 2020-10-21 NOTE — TELEPHONE ENCOUNTER
appt changed to video visit. Pt was instructed to download Etalia frederick on her smart phone and begin the E-Check in process. Pt to call back if any issues with this.

## 2020-10-22 NOTE — PROGRESS NOTES
Telehealth outside of [address] Verbal Consent   I conducted a telehealth visit with Abdias Monteiro today, 10/22/20, which was completed using two-way, real-time interactive audio and video communication.  This has been done in good wayne to pro laxative to use the restroom. Her stomach is bloated. Fatigue - Prior to Fentanyl fatigue was very bad. Pain - Hypersensitive nerves. Something that shouldn't be painful does feel. She can't stand for very long. It is too painful  back and legs.  She can Current Assessment & Plan     Stable continue present management             Mental Health    ADHD (attention deficit hyperactivity disorder), inattentive type - Primary (Chronic)    Overview     adderall 20 BID, in remission for sx            Estimated yves

## 2020-10-23 ENCOUNTER — TELEPHONE (OUTPATIENT)
Dept: FAMILY MEDICINE CLINIC | Facility: CLINIC | Age: 55
End: 2020-10-23

## 2020-10-23 NOTE — TELEPHONE ENCOUNTER
YESSSSSSSS!!!!!!! That was what recent call was all about. Weight down 10% since last check (113 became 102!)  I do not knew how much clearer that could have been.  It could be aggregating weight loss

## 2020-10-29 ENCOUNTER — PATIENT MESSAGE (OUTPATIENT)
Dept: FAMILY MEDICINE CLINIC | Facility: CLINIC | Age: 55
End: 2020-10-29

## 2020-10-30 NOTE — TELEPHONE ENCOUNTER
From: Ann-Marie Sandoval  To: Jos Pedraza MD  Sent: 10/29/2020 7:50 PM CDT  Subject: Other    One Hurtis Shorten Dr Monika Thompson: I discontinued the Adderall as instructed. It clearly was contributing to the nausea. In addition, I have found out I am allergic to buttermilk (which I had daily intake). I've been able to eat normally again. My weight has increased to 105.8 pounds. I have been having regular bowel movements with the increased food intake. Discontinuing the Adderall has left me lethargic and melancholy. The only nausea I am experiencing is with the break-through pain I have on the Fentanyl. I have scheduled our One-month follow-up for November 18. Thank you.   Sha Porter

## 2020-11-18 NOTE — PATIENT INSTRUCTIONS
Adderall. 1/2 tab for 2-3 days then 1/2 tab 2x daily for 2-3 days and see how weight and appetite does.

## 2020-11-18 NOTE — PROGRESS NOTES
This visit is conducted using Telemedicine with live, interactive video and audio. No chief complaint on file. Subjective   HPI:   This is a 60year old female coming in for follow up weight loss. Up to 110.  Wt Readings from Last 10 Encounters:  1 mood and affect.  Her behavior is normal. Judgment and thought content normal.            Assessment and Plan:     Problem List Items Addressed This Visit        Endocrine    Chronic lymphocytic thyroiditis (Chronic)    Overview     Dx at [facility] in about 2005

## 2021-02-07 ENCOUNTER — PATIENT MESSAGE (OUTPATIENT)
Dept: TELEHEALTH | Age: 56
End: 2021-02-07

## 2021-02-11 ENCOUNTER — PATIENT MESSAGE (OUTPATIENT)
Dept: FAMILY MEDICINE CLINIC | Facility: CLINIC | Age: 56
End: 2021-02-11

## 2021-02-12 ENCOUNTER — TELEMEDICINE (OUTPATIENT)
Dept: FAMILY MEDICINE CLINIC | Facility: CLINIC | Age: 56
End: 2021-02-12

## 2021-02-12 DIAGNOSIS — F33.0 MILD RECURRENT MAJOR DEPRESSION (HCC): Chronic | ICD-10-CM

## 2021-02-12 DIAGNOSIS — D80.1 HYPOGAMMAGLOBULINEMIA, ACQUIRED (HCC): ICD-10-CM

## 2021-02-12 DIAGNOSIS — E06.3 CHRONIC LYMPHOCYTIC THYROIDITIS: ICD-10-CM

## 2021-02-12 DIAGNOSIS — G04.81: Primary | ICD-10-CM

## 2021-02-12 DIAGNOSIS — G47.10 HYPERSOMNIA: ICD-10-CM

## 2021-02-12 DIAGNOSIS — G89.4 CHRONIC PAIN SYNDROME: ICD-10-CM

## 2021-02-12 DIAGNOSIS — F90.0 ADHD (ATTENTION DEFICIT HYPERACTIVITY DISORDER), INATTENTIVE TYPE: ICD-10-CM

## 2021-02-12 DIAGNOSIS — M54.50 CHRONIC MIDLINE LOW BACK PAIN WITHOUT SCIATICA: ICD-10-CM

## 2021-02-12 DIAGNOSIS — G89.29 CHRONIC MIDLINE LOW BACK PAIN WITHOUT SCIATICA: ICD-10-CM

## 2021-02-12 PROCEDURE — 99214 OFFICE O/P EST MOD 30 MIN: CPT | Performed by: FAMILY MEDICINE

## 2021-02-12 RX ORDER — HYDROCODONE BITARTRATE AND ACETAMINOPHEN 5; 325 MG/1; MG/1
1 TABLET ORAL EVERY 8 HOURS PRN
Qty: 60 TABLET | Refills: 0 | Status: SHIPPED | OUTPATIENT
Start: 2021-02-12 | End: 2021-05-12

## 2021-02-12 RX ORDER — DEXTROAMPHETAMINE SACCHARATE, AMPHETAMINE ASPARTATE, DEXTROAMPHETAMINE SULFATE AND AMPHETAMINE SULFATE 5; 5; 5; 5 MG/1; MG/1; MG/1; MG/1
20 TABLET ORAL 2 TIMES DAILY
Qty: 60 TABLET | Refills: 0 | Status: SHIPPED | OUTPATIENT
Start: 2021-03-14 | End: 2021-05-12

## 2021-02-12 RX ORDER — HYDROCODONE BITARTRATE AND ACETAMINOPHEN 5; 325 MG/1; MG/1
1 TABLET ORAL EVERY 8 HOURS PRN
Qty: 60 TABLET | Refills: 0 | Status: SHIPPED | OUTPATIENT
Start: 2021-03-14 | End: 2021-05-12

## 2021-02-12 RX ORDER — FENTANYL 25 UG/H
1 PATCH TRANSDERMAL
Qty: 10 PATCH | Refills: 0 | Status: SHIPPED | OUTPATIENT
Start: 2021-02-12 | End: 2021-05-12

## 2021-02-12 RX ORDER — FENTANYL 25 UG/H
1 PATCH TRANSDERMAL
Qty: 10 PATCH | Refills: 0 | Status: SHIPPED | OUTPATIENT
Start: 2021-03-14 | End: 2021-05-12

## 2021-02-12 RX ORDER — FENTANYL 25 UG/H
1 PATCH TRANSDERMAL
Qty: 10 PATCH | Refills: 0 | Status: SHIPPED | OUTPATIENT
Start: 2021-04-13 | End: 2021-05-12

## 2021-02-12 RX ORDER — DEXTROAMPHETAMINE SACCHARATE, AMPHETAMINE ASPARTATE, DEXTROAMPHETAMINE SULFATE AND AMPHETAMINE SULFATE 5; 5; 5; 5 MG/1; MG/1; MG/1; MG/1
20 TABLET ORAL 2 TIMES DAILY
Qty: 60 TABLET | Refills: 0 | Status: SHIPPED | OUTPATIENT
Start: 2021-04-13 | End: 2021-05-12

## 2021-02-12 RX ORDER — DEXTROAMPHETAMINE SACCHARATE, AMPHETAMINE ASPARTATE, DEXTROAMPHETAMINE SULFATE AND AMPHETAMINE SULFATE 5; 5; 5; 5 MG/1; MG/1; MG/1; MG/1
20 TABLET ORAL 2 TIMES DAILY
Qty: 60 TABLET | Refills: 0 | Status: SHIPPED | OUTPATIENT
Start: 2021-02-12 | End: 2021-05-12

## 2021-02-12 RX ORDER — HYDROCODONE BITARTRATE AND ACETAMINOPHEN 5; 325 MG/1; MG/1
1 TABLET ORAL EVERY 8 HOURS PRN
Qty: 60 TABLET | Refills: 0 | Status: SHIPPED | OUTPATIENT
Start: 2021-04-13 | End: 2021-05-12

## 2021-02-12 NOTE — PROGRESS NOTES
Subjective   HPI:   This is a 60year old female coming in for had no chief complaint listed for this encounter. doing better overall. Also seeing natjose as well and felin better with fatigue and GI issues. More alert awakening as well. Less rashes.    P Relevant Medications    fentaNYL 25 MCG/HR Transdermal Patch 72 Hr    fentaNYL 25 MCG/HR Transdermal Patch 72 Hr (Start on 3/14/2021)    fentaNYL 25 MCG/HR Transdermal Patch 72 Hr (Start on 4/13/2021)    HYDROcodone-acetaminophen (NORCO) 5-325 MG Oral Tab 2016         Current Assessment & Plan     Stable continue present management .  Avoid covid vaccines except mRNA           Continue current pain meds, continue to follow closely and reassess in 3 months    Return in about 3 months (around 5/12/2021) for AW

## 2021-02-12 NOTE — TELEPHONE ENCOUNTER
From: Harrison Mcdermott  To: Magno Godinez MD  Sent: 2/11/2021 7:37 PM CST  Subject: Other    Hi Dr. Yanna Gonzalez,   Attached you will find an After Visit Summary from an appointment I had with Dr. Meena Mckeon on February 5, 2021. She is my Naturopathic doctor. This was my second tel-med visit with her. We have made progress in several areas. We can discuss at my next appointment. Thank you, Kindly.    Harrison Mcdermott

## 2021-03-17 ENCOUNTER — TELEPHONE (OUTPATIENT)
Dept: FAMILY MEDICINE CLINIC | Facility: CLINIC | Age: 56
End: 2021-03-17

## 2021-03-17 NOTE — TELEPHONE ENCOUNTER
Patient is due for their annual physical and pap please call patient to have them scheduled for an appointment.  Ask if they see gyne if they do just have them scheduled as a annual physical     Thank you     Routed to front staff

## 2021-04-14 DIAGNOSIS — F33.0 MILD RECURRENT MAJOR DEPRESSION (HCC): Chronic | ICD-10-CM

## 2021-04-15 RX ORDER — VENLAFAXINE HYDROCHLORIDE 150 MG/1
CAPSULE, EXTENDED RELEASE ORAL
Qty: 180 CAPSULE | Refills: 3 | Status: SHIPPED | OUTPATIENT
Start: 2021-04-15

## 2021-04-15 NOTE — TELEPHONE ENCOUNTER
Refill request for:    Requested Prescriptions     Pending Prescriptions Disp Refills   • VENLAFAXINE HCL  MG Oral Capsule SR 24 Hr [Pharmacy Med Name: VENLAFAXINE 150MG ER CAPSULES] 180 capsule 3     Sig: TAKE 2 CAPSULES BY MOUTH [facility]

## 2021-04-21 ENCOUNTER — TELEPHONE (OUTPATIENT)
Dept: FAMILY MEDICINE CLINIC | Facility: CLINIC | Age: 56
End: 2021-04-21

## 2021-04-21 NOTE — TELEPHONE ENCOUNTER
Mammogram is overdue. Order was placed on 8/18/20 and letter was sent that day also.  Overdue letter was sent via [facility]

## 2021-05-12 PROBLEM — J41.0 SIMPLE CHRONIC BRONCHITIS (HCC): Chronic | Status: ACTIVE | Noted: 2021-05-12

## 2021-05-12 NOTE — PROGRESS NOTES
This visit is conducted using Telemedicine with live, interactive video and audio. Patient has been referred to the [facility] website at [email or web address] to review the yearly Consent to Treat document.     Patient understands and accepts financial res thyroiditis  Overview:  Dx at [facility] in about 2005  Assessment & Plan:  Stable continue present management   4.  Chronic pain syndrome  Overview:  Due to chronic encephalitis, Off opiod since May 2017   Assessment & Plan:  Stable, continue present management midline low back pain without sciatica  -     fentaNYL; Place 1 patch onto the skin every third day. Dispense: 10 patch; Refill: 0  -     fentaNYL; Place 1 patch onto the skin every third day. Dispense: 10 patch;  Refill: 0  -     fentaNYL; Place 1 patch

## 2021-06-01 ENCOUNTER — PATIENT MESSAGE (OUTPATIENT)
Dept: FAMILY MEDICINE CLINIC | Facility: CLINIC | Age: 56
End: 2021-06-01

## 2021-06-02 RX ORDER — SOLRIAMFETOL 150 MG/1
150 TABLET, FILM COATED ORAL DAILY
Qty: 15 TABLET | Refills: 1 | Status: SHIPPED | OUTPATIENT
Start: 2021-06-02 | End: 2021-09-09

## 2021-06-04 ENCOUNTER — TELEPHONE (OUTPATIENT)
Dept: FAMILY MEDICINE CLINIC | Facility: CLINIC | Age: 56
End: 2021-06-04

## 2021-06-04 NOTE — TELEPHONE ENCOUNTER
Received PA from [employer] ([employer]) for [employer]. LM to advise pt on PA process.  Placed in Pat's inbox     Blake:[id number]

## 2021-06-08 NOTE — TELEPHONE ENCOUNTER
We received a faxed response from [facility] regarding coverage of Sunosi Tabs, coverage has been denied because Hypersomnia is not a conditiion that supports use of this medication according to the [address] [place] [address] Avenue   Pt can use [facility] or p

## 2021-06-09 NOTE — TELEPHONE ENCOUNTER
Called patient and informed her of denial and use of [address], patient had picked up prescription and paid out of pocket

## 2021-06-17 ENCOUNTER — PATIENT MESSAGE (OUTPATIENT)
Dept: FAMILY MEDICINE CLINIC | Facility: CLINIC | Age: 56
End: 2021-06-17

## 2021-06-17 NOTE — TELEPHONE ENCOUNTER
From: Qi Martin  To: Davi Hernandez MD  Sent: 6/17/2021 2:28 PM CDT  Subject: Other    Hi Dr Tomi Garner: I'm finishing up my 2-week trial with Sunosi. I have seen significant results. I do not feel lethargic. This week I drove my car, (Something I haven't done for years). The problem is that I can't afford the drug. Medicare RX denied coverage because I'm using it off label. Unfortunately, the drug company will not offer any assistance in paying for the drug for the same reason (Off Label). I plan on filing an appeal to Medicare RX. 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 . Can you please tell me if you know of any other recourse in trying to pay for this drug? It's a game-changer. Thank you for your help.  Qi Martin

## 2021-06-19 ENCOUNTER — HOSPITAL ENCOUNTER (OUTPATIENT)
Dept: MRI IMAGING | Age: 56
Discharge: HOME | End: 2021-06-19
Attending: FAMILY MEDICINE
Payer: MEDICARE

## 2021-06-19 DIAGNOSIS — M54.50 CHRONIC MIDLINE LOW BACK PAIN WITHOUT SCIATICA: ICD-10-CM

## 2021-06-19 DIAGNOSIS — G89.29 CHRONIC MIDLINE LOW BACK PAIN WITHOUT SCIATICA: ICD-10-CM

## 2021-06-19 PROCEDURE — 72148 MRI LUMBAR SPINE W/O DYE: CPT | Performed by: FAMILY MEDICINE

## 2021-06-28 ENCOUNTER — PATIENT MESSAGE (OUTPATIENT)
Dept: FAMILY MEDICINE CLINIC | Facility: CLINIC | Age: 56
End: 2021-06-28

## 2021-06-29 NOTE — TELEPHONE ENCOUNTER
From: [place]  To: Makayla Gould MD  Sent: 6/28/2021 4:17 PM CDT  Subject: Test Results Question    Hi Dr. Liya Montilla: My MRI shows some issues with the L4 area ( disc issues). You recommended a neurosurgeon, Dr Mei Cr. You said we can discuss at my next appointment. I'm in excruciating pain and would like to see Dr Mei Cr immediately. Do you need to see me first? I don't need to see you until August and I want to get working on this pain. Please advise.    Bri Avila

## 2021-07-07 ENCOUNTER — OFFICE VISIT (OUTPATIENT)
Dept: SURGERY | Facility: CLINIC | Age: 56
End: 2021-07-07
Payer: MEDICARE

## 2021-07-07 VITALS
HEIGHT: 62 IN | BODY MASS INDEX: 20.8 KG/M2 | DIASTOLIC BLOOD PRESSURE: 60 MMHG | WEIGHT: 113 LBS | SYSTOLIC BLOOD PRESSURE: 97 MMHG

## 2021-07-07 DIAGNOSIS — M47.816 ARTHRITIS OF FACET JOINT OF LUMBAR SPINE: ICD-10-CM

## 2021-07-07 DIAGNOSIS — M62.838 MUSCLE SPASM: ICD-10-CM

## 2021-07-07 DIAGNOSIS — M48.061 LUMBAR FORAMINAL STENOSIS: ICD-10-CM

## 2021-07-07 DIAGNOSIS — M51.36 DDD (DEGENERATIVE DISC DISEASE), LUMBAR: ICD-10-CM

## 2021-07-07 DIAGNOSIS — M53.3 PAIN OF BOTH SACROILIAC JOINTS: ICD-10-CM

## 2021-07-07 DIAGNOSIS — M54.50 LUMBAR PAIN: Primary | ICD-10-CM

## 2021-07-07 DIAGNOSIS — M62.89 MUSCLE TIGHTNESS: ICD-10-CM

## 2021-07-07 DIAGNOSIS — M47.816 LUMBAR FACET JOINT SYNDROME: ICD-10-CM

## 2021-07-07 PROCEDURE — 99215 OFFICE O/P EST HI 40 MIN: CPT | Performed by: PHYSICIAN ASSISTANT

## 2021-07-07 NOTE — H&P
Patient:  Leonid Galindo  Medical Record Number: [id number]  YOB: 1965  PCP: Iman Estes MD    Referring Physician: Iman Estes  Reason for visit: Patient presents with:  New Patient: lumbar pain      Dear Dr. Valerio Patient:  Thank you very • Kidney infection 01/2017   • Thyroid disease    • Visual impairment     contact      Past Surgical History:   Procedure Laterality Date   • COLONOSCOPY N/A 5/24/2016    Procedure: COLONOSCOPY;  Surgeon: Ernesto Avila MD;  Location: [facility] ENDOSCOPY [START ON 7/11/2021] amphetamine-dextroamphetamine (ADDERALL) 20 MG Oral Tab Take 1 tablet (20 mg total) by mouth 2 (two) times daily.  60 tablet 0   • HYDROcodone-acetaminophen (NORCO) 5-325 MG Oral Tab Take 1 tablet by mouth every 8 (eight) hours as neede (51.3 kg). Her blood pressure is 97/60. GENERAL: Very pleasant patient is in no apparent distress. Sitting comfortably in the examination chair. HEENT: Normocephalic, atraumatic.   RESPIRATORY RATE: Easy and Even  SKIN: Warm and dry  NEURO: Awake, alert abnormality, spinal stenosis, or foraminal narrowing. L2-L3:  No significant disc/facet abnormality, spinal stenosis, or foraminal narrowing.    L3-L4:  Mild degenerative disc bulge/osteophyte complex.  There is mild facet joint degenerative change bilate Reviewed today:    - MRI lumbar spine:     -Multilevel lumbar facet arthropathy, most prominent in the inferior aspect of the lumbar spine. Right L4-5 disc bulge with mild impingement on the L4 nerve root.    - Ordered today:    - XR lumbar spine:     -Reshma Alexander

## 2021-07-07 NOTE — PROGRESS NOTES
Ongoing back pain  Pretty severe  No accident or injury that made it worse    MRI in chart    Has been walking with cane or wheelchair due the pain.     No back sx  No epidural injections  No PT for back    Pain is low back and did have a little sciatic ciarra

## 2021-07-08 ENCOUNTER — PATIENT MESSAGE (OUTPATIENT)
Dept: FAMILY MEDICINE CLINIC | Facility: CLINIC | Age: 56
End: 2021-07-08

## 2021-07-09 NOTE — TELEPHONE ENCOUNTER
From: Sherry Rodriguez  To: Stephany Hui MD  Sent: 7/8/2021 7:24 PM CDT  Subject: Visit Follow-up Question    Hi Dr. Kai Godwin, I saw Gretchen Duenas PA-C, from the neurosurgeon's office. She was very thorough. I will be setting up physical therapy with a home health care agency. I will also receive spinal injections. Hopefully, some of this pain will be alleviated. Until then, can you please tell me the maximum dose of hydrocodone that I may have. I will not need any additional prescriptions, even if there is an increase in the dosage. The pain is excruciating (level 8-10). I would like to increase the medication. Currently, I cannot sit up due to the pain.  Thank you kindly,  Lynn Grace

## 2021-07-11 ENCOUNTER — TELEPHONE (OUTPATIENT)
Dept: FAMILY MEDICINE CLINIC | Facility: CLINIC | Age: 56
End: 2021-07-11

## 2021-07-11 DIAGNOSIS — E78.2 MIXED HYPERLIPIDEMIA: Primary | ICD-10-CM

## 2021-07-14 RX ORDER — ATORVASTATIN CALCIUM 10 MG/1
TABLET, FILM COATED ORAL
Qty: 90 TABLET | Refills: 0 | Status: SHIPPED | OUTPATIENT
Start: 2021-07-14 | End: 2021-11-10

## 2021-07-14 NOTE — TELEPHONE ENCOUNTER
LOV deny edwards/Myrna on 05/12/2021 depression  NOTED  Return in about 3 months (around 8/12/2021) for recheck.   No future appt scheduled  Last Lipid one year ago high HDL 07/15/2020

## 2021-07-14 NOTE — TELEPHONE ENCOUNTER
ATORVASTATIN 10MG TABLETS     Sig: TAKE 1 TABLET(10 MG) BY MOUTH DAILY    Disp:  90 tablet    Refills:  3 (Pharmacy requested: Not specified)    Start: 7/11/2021    Class: Normal    Non-formulary    Last ordered: 11 months ago by Kylee Alberts MD Last refil

## 2021-07-14 NOTE — TELEPHONE ENCOUNTER
Patient did refuse appointment for physical and labs. She is Bed Written. Dr Alan Sharif is aware  Of this condition.

## 2021-07-14 NOTE — TELEPHONE ENCOUNTER
Pt is due for physical and pap and labs  along with request by Dr Brenna Dahl for Dept follow-up Aug 2021  Will refill for one 90 day period to give time to schedule physical and labs

## 2021-08-02 NOTE — TELEPHONE ENCOUNTER
WE can do video AWV, so no excude for not doing follow up. Please arrange video AWV.  Thanks and stay well, Florestine Runner, MD

## 2021-08-04 NOTE — TELEPHONE ENCOUNTER
Called patient and left message on machine to contact office and schedule appointment for video visit with Dr. Teena Sharif for medicare annual

## 2021-08-06 ENCOUNTER — APPOINTMENT (OUTPATIENT)
Dept: MRI IMAGING | Facility: HOSPITAL | Age: 56
End: 2021-08-06
Attending: EMERGENCY MEDICINE
Payer: MEDICARE

## 2021-08-06 ENCOUNTER — HOSPITAL ENCOUNTER (EMERGENCY)
Facility: HOSPITAL | Age: 56
Discharge: HOME | End: 2021-08-06
Attending: EMERGENCY MEDICINE
Payer: MEDICARE

## 2021-08-06 VITALS
WEIGHT: 98 LBS | TEMPERATURE: 98 F | DIASTOLIC BLOOD PRESSURE: 81 MMHG | RESPIRATION RATE: 18 BRPM | SYSTOLIC BLOOD PRESSURE: 135 MMHG | HEART RATE: 55 BPM | OXYGEN SATURATION: 98 % | BODY MASS INDEX: 18 KG/M2

## 2021-08-06 DIAGNOSIS — R20.2 PARESTHESIAS: Primary | ICD-10-CM

## 2021-08-06 LAB
ALBUMIN SERPL-MCNC: 3.7 G/DL (ref 3.4–5)
ALBUMIN/GLOB SERPL: 1.2 {RATIO} (ref 1–2)
ALP LIVER SERPL-CCNC: 49 U/L
ALT SERPL-CCNC: 25 U/L
ANION GAP SERPL CALC-SCNC: 2 MMOL/L (ref 0–18)
AST SERPL-CCNC: 16 U/L (ref 15–37)
BASOPHILS # BLD AUTO: 0.04 X10(3) UL (ref 0–0.2)
BASOPHILS NFR BLD AUTO: 0.7 %
BILIRUB SERPL-MCNC: 0.3 MG/DL (ref 0.1–2)
BUN BLD-MCNC: 7 MG/DL (ref 7–18)
CALCIUM BLD-MCNC: 8.7 MG/DL (ref 8.5–10.1)
CHLORIDE SERPL-SCNC: 106 MMOL/L (ref 98–112)
CO2 SERPL-SCNC: 32 MMOL/L (ref 21–32)
CREAT BLD-MCNC: 0.85 MG/DL
EOSINOPHIL # BLD AUTO: 0.04 X10(3) UL (ref 0–0.7)
EOSINOPHIL NFR BLD AUTO: 0.7 %
ERYTHROCYTE [DISTWIDTH] IN BLOOD BY AUTOMATED COUNT: 12 %
GLOBULIN PLAS-MCNC: 3.1 G/DL (ref 2.8–4.4)
GLUCOSE BLD-MCNC: 96 MG/DL (ref 70–99)
HCT VFR BLD AUTO: 40.8 %
HGB BLD-MCNC: 14 G/DL
IMM GRANULOCYTES # BLD AUTO: 0.01 X10(3) UL (ref 0–1)
IMM GRANULOCYTES NFR BLD: 0.2 %
LYMPHOCYTES # BLD AUTO: 0.9 X10(3) UL (ref 1–4)
LYMPHOCYTES NFR BLD AUTO: 15.6 %
M PROTEIN MFR SERPL ELPH: 6.8 G/DL (ref 6.4–8.2)
MCH RBC QN AUTO: 31.4 PG (ref 26–34)
MCHC RBC AUTO-ENTMCNC: 34.3 G/DL (ref 31–37)
MCV RBC AUTO: 91.5 FL
MONOCYTES # BLD AUTO: 0.44 X10(3) UL (ref 0.1–1)
MONOCYTES NFR BLD AUTO: 7.6 %
NEUTROPHILS # BLD AUTO: 4.33 X10 (3) UL (ref 1.5–7.7)
NEUTROPHILS # BLD AUTO: 4.33 X10(3) UL (ref 1.5–7.7)
NEUTROPHILS NFR BLD AUTO: 75.2 %
OSMOLALITY SERPL CALC.SUM OF ELEC: 288 MOSM/KG (ref 275–295)
PLATELET # BLD AUTO: 171 10(3)UL (ref 150–450)
POTASSIUM SERPL-SCNC: 3.7 MMOL/L (ref 3.5–5.1)
RBC # BLD AUTO: 4.46 X10(6)UL
SODIUM SERPL-SCNC: 140 MMOL/L (ref 136–145)
WBC # BLD AUTO: 5.8 X10(3) UL (ref 4–11)

## 2021-08-06 PROCEDURE — 99284 EMERGENCY DEPT VISIT MOD MDM: CPT

## 2021-08-06 PROCEDURE — 93010 ELECTROCARDIOGRAM REPORT: CPT

## 2021-08-06 PROCEDURE — 85025 COMPLETE CBC W/AUTO DIFF WBC: CPT | Performed by: EMERGENCY MEDICINE

## 2021-08-06 PROCEDURE — A9575 INJ GADOTERATE MEGLUMI 0.1ML: HCPCS | Performed by: EMERGENCY MEDICINE

## 2021-08-06 PROCEDURE — 99285 EMERGENCY DEPT VISIT HI MDM: CPT

## 2021-08-06 PROCEDURE — 80053 COMPREHEN METABOLIC PANEL: CPT | Performed by: EMERGENCY MEDICINE

## 2021-08-06 PROCEDURE — 93005 ELECTROCARDIOGRAM TRACING: CPT

## 2021-08-06 PROCEDURE — 70553 MRI BRAIN STEM W/O & W/DYE: CPT | Performed by: EMERGENCY MEDICINE

## 2021-08-06 PROCEDURE — 36415 COLL VENOUS BLD VENIPUNCTURE: CPT

## 2021-08-07 LAB
ATRIAL RATE: 60 BPM
P AXIS: 68 DEGREES
P-R INTERVAL: 134 MS
Q-T INTERVAL: 450 MS
QRS DURATION: 72 MS
QTC CALCULATION (BEZET): 450 MS
R AXIS: 54 DEGREES
T AXIS: 50 DEGREES
VENTRICULAR RATE: 60 BPM

## 2021-08-07 NOTE — ED PROVIDER NOTES
Patient Seen in: [facility] Emergency Department      History   Patient presents with:  Numbness Weakness    Stated Complaint: numbness to rt side since yesterday at noon    HPI/Subjective:   HPI    58-year-old female complaining of numbness of the Systems    Positive for stated complaint: numbness to rt side since yesterday at noon  Other systems are as noted in HPI. Constitutional and vital signs reviewed. All other systems reviewed and negative except as noted above.     Physical Exam     ED view results for these tests on the individual orders. EKG    Rate, intervals and axes as noted on EKG Report. Rate: 60  Rhythm: Sinus Rhythm  Reading: Nonspecific T wave abnormality this is an abnormal EKG              Labs unremarkable.   MRI BRAIN (

## 2021-08-09 ENCOUNTER — PATIENT MESSAGE (OUTPATIENT)
Dept: FAMILY MEDICINE CLINIC | Facility: CLINIC | Age: 56
End: 2021-08-09

## 2021-08-09 DIAGNOSIS — M54.50 CHRONIC MIDLINE LOW BACK PAIN WITHOUT SCIATICA: ICD-10-CM

## 2021-08-09 DIAGNOSIS — G89.4 CHRONIC PAIN SYNDROME: ICD-10-CM

## 2021-08-09 DIAGNOSIS — G89.29 CHRONIC MIDLINE LOW BACK PAIN WITHOUT SCIATICA: ICD-10-CM

## 2021-08-10 RX ORDER — FENTANYL 25 UG/H
1 PATCH TRANSDERMAL
Qty: 10 PATCH | Refills: 0 | Status: SHIPPED | OUTPATIENT
Start: 2021-08-10 | End: 2021-09-09

## 2021-08-10 NOTE — TELEPHONE ENCOUNTER
Pt called and wanted to make sure that Dr Sonia Quinn received this message. She stated that she will be out of this medication tomorrow and she has a very rare condition that when she is in pain she could have a seizure and die.   I explained that Dr Sonai Quinn is not

## 2021-08-10 NOTE — TELEPHONE ENCOUNTER
Future Appointments   Date Time Provider Milla Garcia   9/15/2021  3:30 PM Courtney Miller MD [facility]     Pt is requesting a refill of Fentanyl    Routed to Dr Marcie White.

## 2021-08-10 NOTE — TELEPHONE ENCOUNTER
Vijay assist her in getting a follow up in next few weeks.    Future Appointments   Date Time Provider Milla Garcia   9/15/2021  3:30 PM Christianne Gandhi MD [facility]    This is TOO LATE for her 90 days follow up, 9/9/2021 is next refill and I tamanna

## 2021-08-10 NOTE — TELEPHONE ENCOUNTER
From: Montserrat Hillman  To: Macarena Garner MD  Sent: 8/9/2021 7:50 PM CDT  Subject: Other    Hi Dr. Ru Reyna,  I just realized I'm out of Fentanyl Patches.  I'll be happy to schedule my next visit with you, but I doubt it will be possible to fit in soon enough to

## 2021-08-10 NOTE — TELEPHONE ENCOUNTER
New script sent, but will need to follow up sooner then 9.15 visit as she will run out again in 30 days. I told her she need to follow up regulalry and not give us out of visit refill demands as we will have to look at other options.

## 2021-09-04 ENCOUNTER — TELEPHONE (OUTPATIENT)
Dept: FAMILY MEDICINE CLINIC | Facility: CLINIC | Age: 56
End: 2021-09-04

## 2021-09-04 NOTE — TELEPHONE ENCOUNTER
Received Prior Authorization for Sunosi 150 mg tablets with two Key numbers,    Key:  [id number] and Key: [id number]    LM for patient explaining the PA process. Placed in Tonie Francisco RN's in box for review.

## 2021-09-07 NOTE — TELEPHONE ENCOUNTER
Sunosi was previously denied by [facility] June 2021  Patient was asked to try [facility]

## 2021-09-09 ENCOUNTER — TELEMEDICINE (OUTPATIENT)
Dept: FAMILY MEDICINE CLINIC | Facility: CLINIC | Age: 56
End: 2021-09-09

## 2021-09-09 DIAGNOSIS — E06.3 CHRONIC LYMPHOCYTIC THYROIDITIS: Chronic | ICD-10-CM

## 2021-09-09 DIAGNOSIS — Z00.00 LABORATORY EXAMINATION ORDERED AS PART OF A ROUTINE GENERAL MEDICAL EXAMINATION: ICD-10-CM

## 2021-09-09 DIAGNOSIS — Z13.6 SCREENING FOR CARDIOVASCULAR CONDITION: ICD-10-CM

## 2021-09-09 DIAGNOSIS — G47.10 HYPERSOMNIA: ICD-10-CM

## 2021-09-09 DIAGNOSIS — E03.9 ACQUIRED HYPOTHYROIDISM: Chronic | ICD-10-CM

## 2021-09-09 DIAGNOSIS — M54.50 CHRONIC MIDLINE LOW BACK PAIN WITHOUT SCIATICA: ICD-10-CM

## 2021-09-09 DIAGNOSIS — Z12.31 VISIT FOR SCREENING MAMMOGRAM: ICD-10-CM

## 2021-09-09 DIAGNOSIS — D63.8 ANEMIA OF CHRONIC DISEASE: ICD-10-CM

## 2021-09-09 DIAGNOSIS — J41.0 SIMPLE CHRONIC BRONCHITIS (HCC): Primary | Chronic | ICD-10-CM

## 2021-09-09 DIAGNOSIS — E61.1 IRON DEFICIENCY: ICD-10-CM

## 2021-09-09 DIAGNOSIS — F33.0 MILD RECURRENT MAJOR DEPRESSION (HCC): Chronic | ICD-10-CM

## 2021-09-09 DIAGNOSIS — G89.29 CHRONIC MIDLINE LOW BACK PAIN WITHOUT SCIATICA: ICD-10-CM

## 2021-09-09 DIAGNOSIS — G89.4 CHRONIC PAIN SYNDROME: ICD-10-CM

## 2021-09-09 PROCEDURE — 99214 OFFICE O/P EST MOD 30 MIN: CPT | Performed by: FAMILY MEDICINE

## 2021-09-09 RX ORDER — FENTANYL 25 UG/H
1 PATCH TRANSDERMAL
Qty: 10 PATCH | Refills: 0 | Status: SHIPPED | OUTPATIENT
Start: 2021-10-09 | End: 2021-12-03

## 2021-09-09 RX ORDER — DEXTROAMPHETAMINE SACCHARATE, AMPHETAMINE ASPARTATE, DEXTROAMPHETAMINE SULFATE AND AMPHETAMINE SULFATE 5; 5; 5; 5 MG/1; MG/1; MG/1; MG/1
20 TABLET ORAL 2 TIMES DAILY
Qty: 60 TABLET | Refills: 0 | Status: SHIPPED | OUTPATIENT
Start: 2021-11-08 | End: 2021-12-03

## 2021-09-09 RX ORDER — FENTANYL 25 UG/H
1 PATCH TRANSDERMAL
Qty: 10 PATCH | Refills: 0 | Status: SHIPPED | OUTPATIENT
Start: 2021-09-09 | End: 2021-12-03

## 2021-09-09 RX ORDER — DEXTROAMPHETAMINE SACCHARATE, AMPHETAMINE ASPARTATE, DEXTROAMPHETAMINE SULFATE AND AMPHETAMINE SULFATE 5; 5; 5; 5 MG/1; MG/1; MG/1; MG/1
20 TABLET ORAL 2 TIMES DAILY
Qty: 60 TABLET | Refills: 0 | Status: SHIPPED | OUTPATIENT
Start: 2021-10-09 | End: 2021-12-03

## 2021-09-09 RX ORDER — DEXTROAMPHETAMINE SACCHARATE, AMPHETAMINE ASPARTATE, DEXTROAMPHETAMINE SULFATE AND AMPHETAMINE SULFATE 5; 5; 5; 5 MG/1; MG/1; MG/1; MG/1
20 TABLET ORAL 2 TIMES DAILY
Qty: 60 TABLET | Refills: 0 | Status: SHIPPED | OUTPATIENT
Start: 2021-09-09 | End: 2021-12-03

## 2021-09-09 RX ORDER — HYDROCODONE BITARTRATE AND ACETAMINOPHEN 5; 325 MG/1; MG/1
1 TABLET ORAL EVERY 8 HOURS PRN
Qty: 60 TABLET | Refills: 0 | Status: SHIPPED | OUTPATIENT
Start: 2021-10-09 | End: 2021-12-03

## 2021-09-09 RX ORDER — HYDROCODONE BITARTRATE AND ACETAMINOPHEN 5; 325 MG/1; MG/1
1 TABLET ORAL EVERY 8 HOURS PRN
Qty: 60 TABLET | Refills: 0 | Status: SHIPPED | OUTPATIENT
Start: 2021-09-09 | End: 2021-12-03

## 2021-09-09 RX ORDER — FENTANYL 25 UG/H
1 PATCH TRANSDERMAL
Qty: 10 PATCH | Refills: 0 | Status: SHIPPED | OUTPATIENT
Start: 2021-11-08 | End: 2021-12-03

## 2021-09-09 RX ORDER — HYDROCODONE BITARTRATE AND ACETAMINOPHEN 5; 325 MG/1; MG/1
1 TABLET ORAL EVERY 8 HOURS PRN
Qty: 60 TABLET | Refills: 0 | Status: SHIPPED | OUTPATIENT
Start: 2021-11-08 | End: 2021-12-03

## 2021-09-09 RX ORDER — SOLRIAMFETOL 150 MG/1
150 TABLET, FILM COATED ORAL DAILY
Qty: 90 TABLET | Refills: 1 | Status: SHIPPED | OUTPATIENT
Start: 2021-09-09 | End: 2021-12-03

## 2021-09-09 RX ORDER — ONDANSETRON 4 MG/1
4 TABLET, ORALLY DISINTEGRATING ORAL EVERY 8 HOURS PRN
Qty: 60 TABLET | Refills: 2 | Status: SHIPPED | OUTPATIENT
Start: 2021-09-09

## 2021-09-09 NOTE — PROGRESS NOTES
Harshil Berman is a 60year old female coming in for had concerns including Medication Request (refills on the prescribed meds ). HPI/Subjective:   Medication Request  This is a chronic problem. The current episode started more than 1 year ago.  The problem oc date: 09/09/2021  3. Chronic lymphocytic thyroiditis  Overview:  Dx at [facility] in about 2005  Assessment & Plan:  Stable, continue present management   Orders:  -     CBC, Platelet; No Differential; Future; Expected date: 09/09/2021  4.  Mild recurrent major d every 8 (eight) hours as needed for Pain. Dispense: 60 tablet; Refill: 0  11. Chronic midline low back pain without sciatica  -     fentaNYL; Place 1 patch onto the skin every third day. Dispense: 10 patch;  Refill: 0  -     fentaNYL; Place 1 patch onto t as previously scheduled.     Aislinn Clemente MD, 9/9/2021, 8:42 AM

## 2021-10-07 DIAGNOSIS — E06.3 HASHIMOTO'S THYROIDITIS: ICD-10-CM

## 2021-10-07 DIAGNOSIS — G04.81: ICD-10-CM

## 2021-10-08 RX ORDER — TRAZODONE HYDROCHLORIDE 100 MG/1
TABLET ORAL
Qty: 90 TABLET | Refills: 0 | Status: SHIPPED | OUTPATIENT
Start: 2021-10-08 | End: 2021-12-03

## 2021-10-08 RX ORDER — LEVOTHYROXINE SODIUM 0.1 MG/1
TABLET ORAL
Qty: 90 TABLET | Refills: 0 | Status: SHIPPED | OUTPATIENT
Start: 2021-10-08 | End: 2021-12-03

## 2021-10-08 NOTE — TELEPHONE ENCOUNTER
Requested Prescriptions     Pending Prescriptions Disp Refills   • TRAZODONE 100 MG Oral Tab [Pharmacy Med Name: TRAZODONE 100MG TABLETS] 90 tablet 3     Sig: TAKE 1 TABLET BY MOUTH DAILY AT NIGHT AS NEEDED FOR SLEEP   • LEVOTHYROXINE 100 MCG Oral Tab [Pha

## 2021-11-08 DIAGNOSIS — E78.2 MIXED HYPERLIPIDEMIA: ICD-10-CM

## 2021-11-10 RX ORDER — ATORVASTATIN CALCIUM 10 MG/1
TABLET, FILM COATED ORAL
Qty: 90 TABLET | Refills: 0 | Status: SHIPPED | OUTPATIENT
Start: 2021-11-10 | End: 2021-12-03

## 2021-11-10 NOTE — TELEPHONE ENCOUNTER
ATORVASTATIN 10MG TABLETS     Sig: TAKE 1 TABLET(10 MG) BY MOUTH DAILY    Disp:  90 tablet    Refills:  0 (Pharmacy requested: Not specified)    Start: 11/8/2021    Class: Normal    Non-formulary For: Mixed hyperlipidemia    Last ordered: 3 months ago by RC

## 2021-11-10 NOTE — TELEPHONE ENCOUNTER
LOV Virtual on 06/06/2021 robe guillermo for bronchitis  Upcoming visit scheduled MAW 12/03/21 grace/ Myrna  Last Lipid 07/15/2020 over one year ago  Future orders not completed  Will give one 90 day refill at this time

## 2021-12-03 ENCOUNTER — TELEMEDICINE (OUTPATIENT)
Dept: FAMILY MEDICINE CLINIC | Facility: CLINIC | Age: 56
End: 2021-12-03

## 2021-12-03 DIAGNOSIS — E78.2 MIXED HYPERLIPIDEMIA: Chronic | ICD-10-CM

## 2021-12-03 DIAGNOSIS — G89.29 CHRONIC MIDLINE LOW BACK PAIN WITHOUT SCIATICA: ICD-10-CM

## 2021-12-03 DIAGNOSIS — E06.3 HASHIMOTO'S THYROIDITIS: ICD-10-CM

## 2021-12-03 DIAGNOSIS — G89.4 CHRONIC PAIN SYNDROME: ICD-10-CM

## 2021-12-03 DIAGNOSIS — Z00.00 ENCOUNTER FOR ANNUAL HEALTH EXAMINATION: ICD-10-CM

## 2021-12-03 DIAGNOSIS — F33.0 MILD RECURRENT MAJOR DEPRESSION (HCC): Chronic | ICD-10-CM

## 2021-12-03 DIAGNOSIS — E03.9 ACQUIRED HYPOTHYROIDISM: Chronic | ICD-10-CM

## 2021-12-03 DIAGNOSIS — D80.1 HYPOGAMMAGLOBULINEMIA, ACQUIRED (HCC): ICD-10-CM

## 2021-12-03 DIAGNOSIS — J41.0 SIMPLE CHRONIC BRONCHITIS (HCC): Chronic | ICD-10-CM

## 2021-12-03 DIAGNOSIS — E06.3 CHRONIC LYMPHOCYTIC THYROIDITIS: Chronic | ICD-10-CM

## 2021-12-03 DIAGNOSIS — Z12.31 VISIT FOR SCREENING MAMMOGRAM: ICD-10-CM

## 2021-12-03 DIAGNOSIS — G47.61 PERIODIC LIMB MOVEMENT DISORDER (PLMD): ICD-10-CM

## 2021-12-03 DIAGNOSIS — Z00.00 ANNUAL PHYSICAL EXAM: Primary | ICD-10-CM

## 2021-12-03 DIAGNOSIS — G47.10 HYPERSOMNIA: Chronic | ICD-10-CM

## 2021-12-03 DIAGNOSIS — G04.81: Chronic | ICD-10-CM

## 2021-12-03 DIAGNOSIS — D63.8 ANEMIA OF CHRONIC DISEASE: ICD-10-CM

## 2021-12-03 DIAGNOSIS — M54.50 CHRONIC MIDLINE LOW BACK PAIN WITHOUT SCIATICA: ICD-10-CM

## 2021-12-03 PROCEDURE — 99214 OFFICE O/P EST MOD 30 MIN: CPT | Performed by: FAMILY MEDICINE

## 2021-12-03 PROCEDURE — G0439 PPPS, SUBSEQ VISIT: HCPCS | Performed by: FAMILY MEDICINE

## 2021-12-03 RX ORDER — TRAZODONE HYDROCHLORIDE 100 MG/1
100 TABLET ORAL NIGHTLY PRN
Qty: 90 TABLET | Refills: 3 | Status: SHIPPED | OUTPATIENT
Start: 2021-12-03

## 2021-12-03 RX ORDER — FENTANYL 25 UG/H
1 PATCH TRANSDERMAL
Qty: 10 PATCH | Refills: 0 | Status: SHIPPED | OUTPATIENT
Start: 2022-01-02 | End: 2022-02-01

## 2021-12-03 RX ORDER — DEXTROAMPHETAMINE SACCHARATE, AMPHETAMINE ASPARTATE, DEXTROAMPHETAMINE SULFATE AND AMPHETAMINE SULFATE 5; 5; 5; 5 MG/1; MG/1; MG/1; MG/1
20 TABLET ORAL 2 TIMES DAILY
Qty: 60 TABLET | Refills: 0 | Status: SHIPPED | OUTPATIENT
Start: 2022-02-01 | End: 2022-03-03

## 2021-12-03 RX ORDER — LEVOTHYROXINE SODIUM 0.1 MG/1
100 TABLET ORAL
Qty: 90 TABLET | Refills: 3 | Status: SHIPPED | OUTPATIENT
Start: 2021-12-03

## 2021-12-03 RX ORDER — FENTANYL 25 UG/H
1 PATCH TRANSDERMAL
Qty: 10 PATCH | Refills: 0 | Status: SHIPPED | OUTPATIENT
Start: 2022-02-01 | End: 2022-03-03

## 2021-12-03 RX ORDER — SOLRIAMFETOL 150 MG/1
150 TABLET, FILM COATED ORAL DAILY
Qty: 90 TABLET | Refills: 1 | Status: SHIPPED | OUTPATIENT
Start: 2021-12-03

## 2021-12-03 RX ORDER — DEXTROAMPHETAMINE SACCHARATE, AMPHETAMINE ASPARTATE, DEXTROAMPHETAMINE SULFATE AND AMPHETAMINE SULFATE 5; 5; 5; 5 MG/1; MG/1; MG/1; MG/1
20 TABLET ORAL 2 TIMES DAILY
Qty: 60 TABLET | Refills: 0 | Status: SHIPPED | OUTPATIENT
Start: 2021-12-03 | End: 2022-01-02

## 2021-12-03 RX ORDER — HYDROCODONE BITARTRATE AND ACETAMINOPHEN 5; 325 MG/1; MG/1
1 TABLET ORAL EVERY 8 HOURS PRN
Qty: 60 TABLET | Refills: 0 | Status: SHIPPED | OUTPATIENT
Start: 2022-01-02 | End: 2022-01-31

## 2021-12-03 RX ORDER — DEXTROAMPHETAMINE SACCHARATE, AMPHETAMINE ASPARTATE, DEXTROAMPHETAMINE SULFATE AND AMPHETAMINE SULFATE 5; 5; 5; 5 MG/1; MG/1; MG/1; MG/1
20 TABLET ORAL 2 TIMES DAILY
Qty: 60 TABLET | Refills: 0 | Status: SHIPPED | OUTPATIENT
Start: 2022-01-02 | End: 2022-02-01

## 2021-12-03 RX ORDER — ATORVASTATIN CALCIUM 10 MG/1
10 TABLET, FILM COATED ORAL DAILY
Qty: 90 TABLET | Refills: 3 | Status: SHIPPED | OUTPATIENT
Start: 2021-12-03

## 2021-12-03 RX ORDER — HYDROCODONE BITARTRATE AND ACETAMINOPHEN 5; 325 MG/1; MG/1
1 TABLET ORAL EVERY 8 HOURS PRN
Qty: 60 TABLET | Refills: 0 | Status: SHIPPED | OUTPATIENT
Start: 2022-02-01 | End: 2022-03-02

## 2021-12-03 RX ORDER — HYDROCODONE BITARTRATE AND ACETAMINOPHEN 5; 325 MG/1; MG/1
1 TABLET ORAL EVERY 8 HOURS PRN
Qty: 60 TABLET | Refills: 0 | Status: SHIPPED | OUTPATIENT
Start: 2021-12-03 | End: 2022-01-01

## 2021-12-03 RX ORDER — FENTANYL 25 UG/H
1 PATCH TRANSDERMAL
Qty: 10 PATCH | Refills: 0 | Status: SHIPPED | OUTPATIENT
Start: 2021-12-03 | End: 2022-01-02

## 2021-12-03 NOTE — PATIENT INSTRUCTIONS
Vanesa Dorsey's SCREENING SCHEDULE   Tests on this list are recommended by your physician but may not be covered, or covered at this frequency, by your insurer. Please check with your insurance carrier before scheduling to verify coverage.    PREVENT recommendations at this time   Pap and Pelvic    Pap   Covered every 2 years for women at normal risk;  Annually if at high risk -  Pap Smear,3 Years Never done    Chlamydia Annually if high risk -  No recommendations at this time   Screening Mammogram    M information from the [address] [place] [address] regarding Advance Directives.

## 2021-12-03 NOTE — PROGRESS NOTES
HPI:   Nabeel Garrett is a 60year old female who presents for a Medicare Subsequent Annual Wellness visit (Pt already had Initial Annual Wellness).     jacques is helping a lot, drove for first time [place] years on [place]   Her last annual assessment has be medications as prescribed: Need some help   She has difficulties Affording Meds based on screening of functional status. Are you able to afford your medications?: No   She has Walking problems based on screening of functional status.    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 HYDROcodone-acetaminophen (NORCO) 5-325 MG Oral Tab, Take 1 tablet by mouth every 8 (eight) hours as needed for Pain. Solriamfetol HCl (SUNOSI) 150 MG Oral Tab, Take 150 mg by mouth daily.   [START ON 2/1/2022] HYDROcodone-acetaminophen (NOR change. HENT: Negative. Eyes: Negative. Respiratory: Negative. Cardiovascular: Negative. Gastrointestinal: Negative. Negative for nausea and vomiting. Endocrine: Negative. Negative for polydipsia, polyphagia and polyuria.    Genitourinary: hyperlipidemia  Overview:  , no meds  Assessment & Plan:  Stable, Continue present management. Cholesterol Lowering Medications          ATORVASTATIN 10 MG Oral Tab          Orders:  -     Atorvastatin Calcium;  Take 1 tablet (10 mg total) by mout Take 1 tablet (20 mg total) by mouth 2 (two) times daily. Dispense: 60 tablet; Refill: 0  -     Amphetamine-Dextroamphetamine; Take 1 tablet (20 mg total) by mouth 2 (two) times daily. Dispense: 60 tablet;  Refill: 0  -     OFFICE/OUTPT VISIT,EST,LEVL IV third day. Dispense: 10 patch; Refill: 0  -     fentaNYL; Place 1 patch onto the skin every third day. Dispense: 10 patch; Refill: 0  -     HYDROcodone-Acetaminophen;  Take 1 tablet by mouth every 8 (eight) hours as needed for Pain (takes 1 pill usually B physical activity?: None  How would you describe your current health state?: Poor  How do you maintain positive mental well-being?: Social Interaction;Puzzles;Games     Supplementary Documentation:   Bette Dorsey's SCREENING SCHEDULE   Tests on this li if diagnosed with risk of osteoporosis or estrogen deficiency. Covered yearly for long-term glucocorticoid medication use (Steroids) No results found for this or any previous visit.       No recommendations at this time   Pap and Pelvic    Pap   Covered

## 2021-12-03 NOTE — ASSESSMENT & PLAN NOTE
Stable, continue present management WBC: 5.8, done on 8/6/2021. HGB: 14, done on 8/6/2021. PLT: 171, done on 8/6/2021.

## 2021-12-03 NOTE — ASSESSMENT & PLAN NOTE
Stable, Continue present management.     Thyroid  (most recent labs)   Lab Results   Component Value Date/Time    TSH 0.719 07/15/2020 05:10 PM    T4F 1.3 07/15/2020 05:10 PM         Endocrine Medications          LEVOTHYROXINE 100 MCG Oral Tab

## 2021-12-06 ENCOUNTER — TELEPHONE (OUTPATIENT)
Dept: FAMILY MEDICINE CLINIC | Facility: CLINIC | Age: 56
End: 2021-12-06

## 2021-12-06 NOTE — TELEPHONE ENCOUNTER
Received fax from [employer], covermymeds, prior authorization required for Sunosi 150 MG Tablets     Key # [id number]    Called patient and she stated no need to process as insurance does not cover she will use [facility]. Spotsize (Include Units): 8

## 2022-02-23 ENCOUNTER — TELEMEDICINE (OUTPATIENT)
Dept: FAMILY MEDICINE CLINIC | Facility: CLINIC | Age: 57
End: 2022-02-23
Payer: COMMERCIAL

## 2022-02-23 DIAGNOSIS — D63.8 ANEMIA OF CHRONIC DISEASE: ICD-10-CM

## 2022-02-23 DIAGNOSIS — G47.61 PERIODIC LIMB MOVEMENT DISORDER (PLMD): ICD-10-CM

## 2022-02-23 DIAGNOSIS — F33.0 MILD RECURRENT MAJOR DEPRESSION (HCC): Chronic | ICD-10-CM

## 2022-02-23 DIAGNOSIS — M54.50 CHRONIC MIDLINE LOW BACK PAIN WITHOUT SCIATICA: ICD-10-CM

## 2022-02-23 DIAGNOSIS — E78.2 MIXED HYPERLIPIDEMIA: Primary | ICD-10-CM

## 2022-02-23 DIAGNOSIS — G04.81: ICD-10-CM

## 2022-02-23 DIAGNOSIS — E03.9 ACQUIRED HYPOTHYROIDISM: ICD-10-CM

## 2022-02-23 DIAGNOSIS — G89.4 CHRONIC PAIN SYNDROME: ICD-10-CM

## 2022-02-23 DIAGNOSIS — G89.29 CHRONIC MIDLINE LOW BACK PAIN WITHOUT SCIATICA: ICD-10-CM

## 2022-02-23 DIAGNOSIS — D80.1 HYPOGAMMAGLOBULINEMIA, ACQUIRED (HCC): ICD-10-CM

## 2022-02-23 DIAGNOSIS — G47.10 HYPERSOMNIA: Chronic | ICD-10-CM

## 2022-02-23 DIAGNOSIS — J41.0 SIMPLE CHRONIC BRONCHITIS (HCC): ICD-10-CM

## 2022-02-23 DIAGNOSIS — E06.3 CHRONIC LYMPHOCYTIC THYROIDITIS: ICD-10-CM

## 2022-02-23 DIAGNOSIS — M46.96 INFLAMMATORY SPONDYLOPATHY OF LUMBAR REGION (HCC): ICD-10-CM

## 2022-02-23 PROCEDURE — 99214 OFFICE O/P EST MOD 30 MIN: CPT | Performed by: FAMILY MEDICINE

## 2022-02-23 RX ORDER — HYDROCODONE BITARTRATE AND ACETAMINOPHEN 5; 325 MG/1; MG/1
1 TABLET ORAL EVERY 8 HOURS PRN
Qty: 60 TABLET | Refills: 0 | Status: SHIPPED | OUTPATIENT
Start: 2022-04-24 | End: 2022-05-23

## 2022-02-23 RX ORDER — HYDROCODONE BITARTRATE AND ACETAMINOPHEN 5; 325 MG/1; MG/1
1 TABLET ORAL EVERY 8 HOURS PRN
Qty: 60 TABLET | Refills: 0 | Status: SHIPPED | OUTPATIENT
Start: 2022-02-23 | End: 2022-03-24

## 2022-02-23 RX ORDER — FENTANYL 25 UG/H
1 PATCH TRANSDERMAL
Qty: 10 PATCH | Refills: 0 | Status: SHIPPED | OUTPATIENT
Start: 2022-02-23 | End: 2022-03-25

## 2022-02-23 RX ORDER — DEXTROAMPHETAMINE SACCHARATE, AMPHETAMINE ASPARTATE, DEXTROAMPHETAMINE SULFATE AND AMPHETAMINE SULFATE 5; 5; 5; 5 MG/1; MG/1; MG/1; MG/1
20 TABLET ORAL 2 TIMES DAILY
Qty: 60 TABLET | Refills: 0 | Status: SHIPPED | OUTPATIENT
Start: 2022-04-24 | End: 2022-05-24

## 2022-02-23 RX ORDER — FENTANYL 25 UG/H
1 PATCH TRANSDERMAL
Qty: 10 PATCH | Refills: 0 | Status: SHIPPED | OUTPATIENT
Start: 2022-03-25 | End: 2022-04-24

## 2022-02-23 RX ORDER — DEXTROAMPHETAMINE SACCHARATE, AMPHETAMINE ASPARTATE, DEXTROAMPHETAMINE SULFATE AND AMPHETAMINE SULFATE 5; 5; 5; 5 MG/1; MG/1; MG/1; MG/1
20 TABLET ORAL 2 TIMES DAILY
Qty: 60 TABLET | Refills: 0 | Status: SHIPPED | OUTPATIENT
Start: 2022-02-23 | End: 2022-03-25

## 2022-02-23 RX ORDER — FENTANYL 25 UG/H
1 PATCH TRANSDERMAL
Qty: 10 PATCH | Refills: 0 | Status: SHIPPED | OUTPATIENT
Start: 2022-04-24 | End: 2022-05-24

## 2022-02-23 RX ORDER — HYDROCODONE BITARTRATE AND ACETAMINOPHEN 5; 325 MG/1; MG/1
1 TABLET ORAL EVERY 8 HOURS PRN
Qty: 60 TABLET | Refills: 0 | Status: SHIPPED | OUTPATIENT
Start: 2022-03-25 | End: 2022-04-23

## 2022-02-23 RX ORDER — DEXTROAMPHETAMINE SACCHARATE, AMPHETAMINE ASPARTATE, DEXTROAMPHETAMINE SULFATE AND AMPHETAMINE SULFATE 5; 5; 5; 5 MG/1; MG/1; MG/1; MG/1
20 TABLET ORAL 2 TIMES DAILY
Qty: 60 TABLET | Refills: 0 | Status: SHIPPED | OUTPATIENT
Start: 2022-03-25 | End: 2022-04-24

## 2022-02-23 NOTE — PROGRESS NOTES
Merlene Hopkins is a 60year old female coming in for had concerns including Medication Request (adderall, narco, patch ). Subjective:   Medication Request  Associated symptoms include fatigue and neck pain. Pertinent negatives include no nausea or weakness. doing well overall, still some low energy days,. More pain in last month. It has been a while since last spinal injeciton, last was in December. ROS: GENERAL HEALTH: feels well otherwise  This visit is conducted using Telemedicine with live, interactive video and audio. Patient has been referred to the [facility] website at [email or web address] to review the yearly Consent to Treat document. Patient understands and accepts financial responsibility for any deductible, co-insurance and/or co-pays associated with this service. due for labs. Ordered in September. Had all 3 moderna vaccines. Objective: There were no vitals taken for this visit. There is no height or weight on file to calculate BMI. Physical Exam  Constitutional:       Appearance: She is well-developed. HENT:      Head: Normocephalic and atraumatic. Pulmonary:      Effort: Pulmonary effort is normal. No respiratory distress. Musculoskeletal:         General: Normal range of motion. Cervical back: Normal range of motion. Skin:     Findings: No rash. Neurological:      Mental Status: She is alert and oriented to person, place, and time. Psychiatric:         Mood and Affect: Mood normal.         Behavior: Behavior normal.         Thought Content: Thought content normal.         Judgment: Judgment normal.           Assessment & Plan:   1. Mixed hyperlipidemia (Primary)  Overview:  , no meds  Assessment & Plan:  Stable, Continue present management. Cholesterol Lowering Medications          atorvastatin 10 MG Oral Tab          2.  Simple chronic bronchitis ([facility] [address]  Overview:  flare in 2020 tx with Breo    Assessment & Plan:  Stable, continue present management 3. Acquired hypothyroidism  Overview:  100 levothyroxine 4/11/2016  Assessment & Plan:  Stable, Continue present management. Thyroid  (most recent labs)   Lab Results   Component Value Date/Time    TSH 0.719 07/15/2020 05:10 PM    T4F 1.3 07/15/2020 05:10 PM         Endocrine Medications          levothyroxine 100 MCG Oral Tab          4. Chronic lymphocytic thyroiditis  Overview:  Dx at [facility] in about 2005  5. 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 Periodic limb movement disorder (PLMD)  Overview:  mirapex 0.125 started 5/1/2019  Assessment & Plan:  Stable, continue present management     7. Chronic pain syndrome  Overview:  Due to chronic encephalitis, Off opiod since May 2017   Assessment & Plan:  Stable, continue present management     Orders:  -     HYDROcodone-Acetaminophen; Take 1 tablet by mouth every 8 (eight) hours as needed for Pain. Dispense: 60 tablet; Refill: 0  -     fentaNYL; Place 1 patch onto the skin every third day. Dispense: 10 patch; Refill: 0  -     fentaNYL; Place 1 patch onto the skin every third day. Dispense: 10 patch; Refill: 0  -     fentaNYL; Place 1 patch onto the skin every third day. Dispense: 10 patch; Refill: 0  -     HYDROcodone-Acetaminophen; Take 1 tablet by mouth every 8 (eight) hours as needed for Pain (takes 1 pill usually BID). Dispense: 60 tablet; Refill: 0  -     HYDROcodone-Acetaminophen; Take 1 tablet by mouth every 8 (eight) hours as needed for Pain. Dispense: 60 tablet; Refill: 0  8. Hypersomnia  Overview:  adderall 20 BID   Assessment & Plan:  Stable, continue present management     Orders:  -     Amphetamine-Dextroamphetamine; Take 1 tablet (20 mg total) by mouth 2 (two) times daily. Dispense: 60 tablet; Refill: 0  -     Amphetamine-Dextroamphetamine;  Take 1 tablet (20 mg total) by mouth 2 (two) times daily. Dispense: 60 tablet; Refill: 0  -     Amphetamine-Dextroamphetamine; Take 1 tablet (20 mg total) by mouth 2 (two) times daily. Dispense: 60 tablet; Refill: 0  9. Mild recurrent major depression (HCC)  Overview:  Venlafaxine  daily, trazodone 1  Assessment & Plan:  Stable, continue present management   Orders:  -     [facility]  10. Hypogammaglobulinemia, acquired [facility])  Overview:  Lab test 2016  Assessment & Plan:  Stable, continue present management     11. Anemia of chronic disease  Overview:  Possible due to plasmaphoresis, nl EGD and colonoscopy  Assessment & Plan:  Stable, continue present management     12. Chronic midline low back pain without sciatica  -     HYDROcodone-Acetaminophen; Take 1 tablet by mouth every 8 (eight) hours as needed for Pain. Dispense: 60 tablet; Refill: 0  -     fentaNYL; Place 1 patch onto the skin every third day. Dispense: 10 patch; Refill: 0  -     fentaNYL; Place 1 patch onto the skin every third day. Dispense: 10 patch; Refill: 0  -     fentaNYL; Place 1 patch onto the skin every third day. Dispense: 10 patch; Refill: 0  -     HYDROcodone-Acetaminophen; Take 1 tablet by mouth every 8 (eight) hours as needed for Pain (takes 1 pill usually BID). Dispense: 60 tablet; Refill: 0  -     HYDROcodone-Acetaminophen; Take 1 tablet by mouth every 8 (eight) hours as needed for Pain. Dispense: 60 tablet; Refill: 0  13. Inflammatory spondylopathy of lumbar region [facility])  Continue to work on her back pain is monitored by pain clinic and hospitalization last year  More depression 7 try to get her counseling, she may need to do video counseling if she is having a hard time still getting out of home due to her chronic thyroiditis condition  I have discontinued Bri Dorsey's cyclobenzaprine, Naloxone HCl, and prochlorperazine.  I am also having her maintain her venlafaxine ER, ondansetron, Sunosi, levothyroxine, traZODone, atorvastatin, amphetamine-dextroamphetamine, HYDROcodone-acetaminophen, fentaNYL, amphetamine-dextroamphetamine, amphetamine-dextroamphetamine, fentaNYL, fentaNYL, HYDROcodone-acetaminophen, and HYDROcodone-acetaminophen. Return in about 3 months (around 5/23/2022).     Denise Samson MD, 2/23/2022, 3:11 PM

## 2022-02-23 NOTE — PATIENT INSTRUCTIONS
You can schedule this by calling Central Scheduling at [phone] or visit the online scheduling site at [email or web address]

## 2022-03-07 ENCOUNTER — TELEPHONE (OUTPATIENT)
Dept: FAMILY MEDICINE CLINIC | Facility: CLINIC | Age: 57
End: 2022-03-07

## 2022-03-09 NOTE — TELEPHONE ENCOUNTER
DENIED Coverage Sunosi   Decision Notes:. All of the criteria must be met for Sunosi to be covered:   1) The drug is being used for a health issue approved by the [employer] (FDA). AND   2) The drug is prescribed by, or together with, a neurology, pulmonology, or sleep medicine specialist. AND   3) Modafinil or armodafinil has been tried and failed. Please note, both require pre-approval and there is a limit on the amount covered at a time. AND   4) Full nocturnal polysomnogram is used to confirm health issue. This is a test that is done to help identify certain health issues linked with sleep. Based on the records we did receive you do not meet number 4 of the criteria. This is because a full nocturnal polysomnogram was not used to confirm health issue. Since criteria have not been met, we cannot show this drug is necessary to treat your health issue. We cannot approve payment for the drug at this time. Your health care provider asked for an exception to the criteria. The records we received did not show that this drug is necessary to treat your health issue. Therefore, an exception cannot be made.

## 2022-03-09 NOTE — TELEPHONE ENCOUNTER
Ok to notify her, see start of message that she is aware of the risk of this not getting covered.  Thanks and stay well, Sowmya Deleon MD

## 2022-04-27 RX ORDER — VENLAFAXINE HYDROCHLORIDE 150 MG/1
CAPSULE, EXTENDED RELEASE ORAL
Qty: 180 CAPSULE | Refills: 3 | Status: SHIPPED | OUTPATIENT
Start: 2022-04-27

## 2022-04-29 ENCOUNTER — PATIENT OUTREACH (OUTPATIENT)
Dept: FAMILY MEDICINE CLINIC | Facility: CLINIC | Age: 57
End: 2022-04-29

## 2022-05-16 ENCOUNTER — TELEPHONE (OUTPATIENT)
Dept: FAMILY MEDICINE CLINIC | Facility: CLINIC | Age: 57
End: 2022-05-16

## 2022-05-18 ENCOUNTER — TELEPHONE (OUTPATIENT)
Dept: FAMILY MEDICINE CLINIC | Facility: CLINIC | Age: 57
End: 2022-05-18

## 2022-05-18 RX ORDER — SOLRIAMFETOL 150 MG/1
1 TABLET, FILM COATED ORAL DAILY
Qty: 30 TABLET | Refills: 0 | Status: SHIPPED | OUTPATIENT
Start: 2022-05-18

## 2022-05-18 NOTE — TELEPHONE ENCOUNTER
LM for patient that Rossy Barroso NP is no longer available for her appt on 5/25/22 and it needs to be changed to a video call with DR. SOL ONLY. For 8:15 am, 10:30 am or 10:45 am. Please let Yolanda know when scheduled so she can remove the HFU.  For 5/25/22

## 2022-05-18 NOTE — TELEPHONE ENCOUNTER
This is a controlled substance that I am not familiar with. I will provide a 30 day refill while Dr. Amanda Hall is out of town. Further refills to come from him. Thanks.

## 2022-05-18 NOTE — TELEPHONE ENCOUNTER
Received fax from [place], prior authorization required for Sunosi 150 MG Tablets    Key # [id number]    Called patient and left message on machine explaining process, fax placed in Pat's in box

## 2022-05-23 NOTE — TELEPHONE ENCOUNTER
LM for pt to call our office so we can confirm her rescheduled video visit with Dr. Camryn Gonzalez 5/25/22 @ 8:15 am.  I also informed her that her video visit that was scheduled with Brenna SAMPSON 5/25 @ 3:15 pm has been cancelled. This pt needs to call our office to confirm her video visit appt with Dr. Cmaryn Gonzalez 5/25/22 @ 8:15 am.  This is the second request we have left for this pt to call our office.

## 2022-05-25 ENCOUNTER — TELEMEDICINE (OUTPATIENT)
Dept: FAMILY MEDICINE CLINIC | Facility: CLINIC | Age: 57
End: 2022-05-25

## 2022-05-25 DIAGNOSIS — G47.33 OSA (OBSTRUCTIVE SLEEP APNEA): ICD-10-CM

## 2022-05-25 DIAGNOSIS — G04.81 AUTOIMMUNE ENCEPHALITIS: ICD-10-CM

## 2022-05-25 DIAGNOSIS — G89.4 CHRONIC PAIN SYNDROME: ICD-10-CM

## 2022-05-25 DIAGNOSIS — G04.81: Primary | ICD-10-CM

## 2022-05-25 DIAGNOSIS — M54.50 CHRONIC MIDLINE LOW BACK PAIN WITHOUT SCIATICA: ICD-10-CM

## 2022-05-25 DIAGNOSIS — G47.411 PRIMARY NARCOLEPSY WITH CATAPLEXY: ICD-10-CM

## 2022-05-25 DIAGNOSIS — G89.29 CHRONIC MIDLINE LOW BACK PAIN WITHOUT SCIATICA: ICD-10-CM

## 2022-05-25 DIAGNOSIS — G47.10 HYPERSOMNIA: ICD-10-CM

## 2022-05-25 PROCEDURE — 99214 OFFICE O/P EST MOD 30 MIN: CPT | Performed by: FAMILY MEDICINE

## 2022-05-25 RX ORDER — DEXTROAMPHETAMINE SACCHARATE, AMPHETAMINE ASPARTATE, DEXTROAMPHETAMINE SULFATE AND AMPHETAMINE SULFATE 5; 5; 5; 5 MG/1; MG/1; MG/1; MG/1
20 TABLET ORAL 2 TIMES DAILY
Qty: 60 TABLET | Refills: 0 | Status: SHIPPED | OUTPATIENT
Start: 2022-05-25 | End: 2022-06-24

## 2022-05-25 RX ORDER — HYDROCODONE BITARTRATE AND ACETAMINOPHEN 5; 325 MG/1; MG/1
1 TABLET ORAL EVERY 8 HOURS PRN
Qty: 60 TABLET | Refills: 0 | Status: SHIPPED | OUTPATIENT
Start: 2022-05-25 | End: 2022-06-23

## 2022-05-25 RX ORDER — DEXTROAMPHETAMINE SACCHARATE, AMPHETAMINE ASPARTATE, DEXTROAMPHETAMINE SULFATE AND AMPHETAMINE SULFATE 5; 5; 5; 5 MG/1; MG/1; MG/1; MG/1
20 TABLET ORAL 2 TIMES DAILY
Qty: 60 TABLET | Refills: 0 | Status: SHIPPED | OUTPATIENT
Start: 2022-06-24 | End: 2022-07-24

## 2022-05-25 RX ORDER — HYDROCODONE BITARTRATE AND ACETAMINOPHEN 5; 325 MG/1; MG/1
1 TABLET ORAL EVERY 8 HOURS PRN
Qty: 60 TABLET | Refills: 0 | Status: SHIPPED | OUTPATIENT
Start: 2022-06-24 | End: 2022-07-23

## 2022-05-25 RX ORDER — HYDROCODONE BITARTRATE AND ACETAMINOPHEN 5; 325 MG/1; MG/1
1 TABLET ORAL EVERY 8 HOURS PRN
Qty: 60 TABLET | Refills: 0 | Status: SHIPPED | OUTPATIENT
Start: 2022-07-24 | End: 2022-08-22

## 2022-05-25 RX ORDER — FENTANYL 25 UG/H
1 PATCH TRANSDERMAL
Qty: 10 PATCH | Refills: 0 | Status: SHIPPED | OUTPATIENT
Start: 2022-05-25 | End: 2022-06-24

## 2022-05-25 RX ORDER — DEXTROAMPHETAMINE SACCHARATE, AMPHETAMINE ASPARTATE, DEXTROAMPHETAMINE SULFATE AND AMPHETAMINE SULFATE 5; 5; 5; 5 MG/1; MG/1; MG/1; MG/1
20 TABLET ORAL 2 TIMES DAILY
Qty: 60 TABLET | Refills: 0 | Status: SHIPPED | OUTPATIENT
Start: 2022-07-24 | End: 2022-08-23

## 2022-05-25 RX ORDER — SOLRIAMFETOL 150 MG/1
1 TABLET, FILM COATED ORAL DAILY
Qty: 90 TABLET | Refills: 1 | Status: SHIPPED | OUTPATIENT
Start: 2022-05-25

## 2022-05-25 RX ORDER — FENTANYL 25 UG/H
1 PATCH TRANSDERMAL
Qty: 10 PATCH | Refills: 0 | Status: SHIPPED | OUTPATIENT
Start: 2022-06-24 | End: 2022-07-24

## 2022-05-25 RX ORDER — FENTANYL 25 UG/H
1 PATCH TRANSDERMAL
Qty: 10 PATCH | Refills: 0 | Status: SHIPPED | OUTPATIENT
Start: 2022-07-24 | End: 2022-08-23

## 2022-05-25 NOTE — PROGRESS NOTES
Vickie Pyle is a 60year old female coming in for had concerns including Medication Request (adderall, fentantyl, norco). Subjective:   HPI doing better, more activities. Able to now do a load of laundry, able to be more alert more of the day. Still home bound and still greatly disabled. didd first drive in car recently    ROS: GENERAL HEALTH: feels well otherwise  Sunosi has made huge diffenece with daytime somolonce and helped with ROSY symptoms and narcolepsy and symptoms form encephalopathy. Objective: There were no vitals taken for this visit. There is no height or weight on file to calculate BMI. Physical Exam  Constitutional:       Appearance: She is well-developed. HENT:      Head: Normocephalic and atraumatic. Pulmonary:      Effort: Pulmonary effort is normal. No respiratory distress. Musculoskeletal:         General: Normal range of motion. Cervical back: Normal range of motion. Skin:     Findings: No rash. Neurological:      Mental Status: She is alert and oriented to person, place, and time. Psychiatric:         Mood and Affect: Mood normal.         Behavior: Behavior normal.         Thought Content: Thought content normal.         Judgment: Judgment normal.           Assessment & Plan:   1. 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 Dispense: 90 tablet; Refill: 1  2. Autoimmune encephalitis  -     Sunosi; Take 1 tablet by mouth daily. Dispense: 90 tablet; Refill: 1  3. Hypersomnia  Overview:  adderall 20 BID   Orders:  -     Sunosi; Take 1 tablet by mouth daily. Dispense: 90 tablet; Refill: 1  -     Amphetamine-Dextroamphetamine; Take 1 tablet (20 mg total) by mouth 2 (two) times daily. Dispense: 60 tablet; Refill: 0  -     Amphetamine-Dextroamphetamine;  Take 1 tablet (20 mg total) by mouth 2 (two) times daily. Dispense: 60 tablet; Refill: 0  -     Amphetamine-Dextroamphetamine; Take 1 tablet (20 mg total) by mouth 2 (two) times daily. Dispense: 60 tablet; Refill: 0  4. Primary narcolepsy with cataplexy  -     Sunosi; Take 1 tablet by mouth daily. Dispense: 90 tablet; Refill: 1  5. ROSY (obstructive sleep apnea)  -     Sunosi; Take 1 tablet by mouth daily. Dispense: 90 tablet; Refill: 1  6. Chronic pain syndrome  Overview:  Due to chronic encephalitis, Off opiod since May 2017   Orders:  -     HYDROcodone-Acetaminophen; Take 1 tablet by mouth every 8 (eight) hours as needed for Pain. Dispense: 60 tablet; Refill: 0  -     fentaNYL; Place 1 patch onto the skin every third day. Dispense: 10 patch; Refill: 0  -     fentaNYL; Place 1 patch onto the skin every third day. Dispense: 10 patch; Refill: 0  -     fentaNYL; Place 1 patch onto the skin every third day. Dispense: 10 patch; Refill: 0  -     HYDROcodone-Acetaminophen; Take 1 tablet by mouth every 8 (eight) hours as needed for Pain (takes 1 pill usually BID). Dispense: 60 tablet; Refill: 0  -     HYDROcodone-Acetaminophen; Take 1 tablet by mouth every 8 (eight) hours as needed for Pain. Dispense: 60 tablet; Refill: 0  7. Chronic midline low back pain without sciatica  -     HYDROcodone-Acetaminophen; Take 1 tablet by mouth every 8 (eight) hours as needed for Pain. Dispense: 60 tablet; Refill: 0  -     fentaNYL; Place 1 patch onto the skin every third day. Dispense: 10 patch; Refill: 0  -     fentaNYL; Place 1 patch onto the skin every third day. Dispense: 10 patch; Refill: 0  -     fentaNYL; Place 1 patch onto the skin every third day. Dispense: 10 patch; Refill: 0  -     HYDROcodone-Acetaminophen; Take 1 tablet by mouth every 8 (eight) hours as needed for Pain (takes 1 pill usually BID). Dispense: 60 tablet; Refill: 0  -     HYDROcodone-Acetaminophen; Take 1 tablet by mouth every 8 (eight) hours as needed for Pain.   Dispense: 60 tablet; Refill: 0   increae to trazodone 150 at night, and new prescription  If tolerating in 1-2 weeks. cosider adderall ER 25-30 in 1-2 months  I am having Orval Paling maintain her ondansetron, levothyroxine, traZODone, atorvastatin, venlafaxine ER, Sunosi, amphetamine-dextroamphetamine, HYDROcodone-acetaminophen, fentaNYL, amphetamine-dextroamphetamine, amphetamine-dextroamphetamine, fentaNYL, fentaNYL, HYDROcodone-acetaminophen, and HYDROcodone-acetaminophen. Return in about 3 months (around 8/25/2022).

## 2022-05-27 NOTE — TELEPHONE ENCOUNTER
Received a fax (some part unreadable) from [facility], pharmacy  requesting additional documentation and polysomnogram   No sleep study performed, however Electroencephalogram report sent from 01/28/2020  Along with LOV video visit 05/25/2022 and last physical video visit 12/03/2021  FAX [phone] - Medicare

## 2022-06-16 PROBLEM — R45.0 FEELING JITTERY: Status: ACTIVE | Noted: 2022-06-16

## 2022-08-17 ENCOUNTER — TELEPHONE (OUTPATIENT)
Dept: FAMILY MEDICINE CLINIC | Facility: CLINIC | Age: 57
End: 2022-08-17

## 2022-08-17 DIAGNOSIS — G89.29 CHRONIC MIDLINE LOW BACK PAIN WITHOUT SCIATICA: ICD-10-CM

## 2022-08-17 DIAGNOSIS — G89.4 CHRONIC PAIN SYNDROME: ICD-10-CM

## 2022-08-17 DIAGNOSIS — M54.50 CHRONIC MIDLINE LOW BACK PAIN WITHOUT SCIATICA: ICD-10-CM

## 2022-08-17 DIAGNOSIS — G47.10 HYPERSOMNIA: ICD-10-CM

## 2022-08-17 RX ORDER — FENTANYL 25 UG/H
1 PATCH TRANSDERMAL
Qty: 10 PATCH | Refills: 0 | Status: SHIPPED | OUTPATIENT
Start: 2022-08-17 | End: 2022-09-16

## 2022-08-17 RX ORDER — DEXTROAMPHETAMINE SACCHARATE, AMPHETAMINE ASPARTATE, DEXTROAMPHETAMINE SULFATE AND AMPHETAMINE SULFATE 5; 5; 5; 5 MG/1; MG/1; MG/1; MG/1
20 TABLET ORAL 2 TIMES DAILY
Qty: 60 TABLET | Refills: 0 | Status: SHIPPED | OUTPATIENT
Start: 2022-08-17 | End: 2022-09-16

## 2022-08-17 RX ORDER — HYDROCODONE BITARTRATE AND ACETAMINOPHEN 5; 325 MG/1; MG/1
1 TABLET ORAL EVERY 8 HOURS PRN
Qty: 60 TABLET | Refills: 0 | Status: SHIPPED | OUTPATIENT
Start: 2022-08-17 | End: 2022-09-15

## 2022-08-17 NOTE — TELEPHONE ENCOUNTER
Pt calling to request partial refill on Fentanyl, Norco, Adderall. Pt will be running out on 8/25/22. Unable to see anyone before then. Scheduled for med refill with Dr. Vickie Gates on 9/2/22. Pt states that if she goes off the medication she will have a fatal seizure due to rare brain disease. Please notify pt once refills have been sent to pharmacy. [facility] on [address].

## 2022-08-20 ENCOUNTER — PATIENT MESSAGE (OUTPATIENT)
Dept: FAMILY MEDICINE CLINIC | Facility: CLINIC | Age: 57
End: 2022-08-20

## 2022-08-22 NOTE — TELEPHONE ENCOUNTER
From: John Wright  To: Iqra Schmid MD  Sent: 8/20/2022 6:28 PM CDT  Subject: Prescriptions    Hi Dr Alicia Corrales: I spoke to the office on Tuesday, August 16. I made an appointment for Sept 2. I told the person on the phone that I needed prescriptions for Norco and Fentanyl by August 25. I told her they were controlled substances and I wasn't sure if that was okay with you to fill them before the appointment. She said they would consult you. She said you could write prescriptions for an weeks amount, so I could make it to the appointment day. I was told a nurse would call me back. I never heard from anyone regarding this. Can you please help me? During our upcoming appointment, I want to ask you to increase my pain medication. Some days I cannot sit up in bed due to the pain. I have taken further steps to address this. Since the injections did not work, I found an Orthopedic Surgeon. I do not care for Doctor Deann Deleon at [facility]. Thank you for your time and your help.  Jeramy Salmon

## 2022-09-02 ENCOUNTER — TELEMEDICINE (OUTPATIENT)
Dept: FAMILY MEDICINE CLINIC | Facility: CLINIC | Age: 57
End: 2022-09-02

## 2022-09-02 DIAGNOSIS — E78.2 MIXED HYPERLIPIDEMIA: ICD-10-CM

## 2022-09-02 DIAGNOSIS — E06.3 HASHIMOTO'S THYROIDITIS: ICD-10-CM

## 2022-09-02 DIAGNOSIS — G89.4 CHRONIC PAIN SYNDROME: ICD-10-CM

## 2022-09-02 DIAGNOSIS — G47.411 PRIMARY NARCOLEPSY WITH CATAPLEXY: ICD-10-CM

## 2022-09-02 DIAGNOSIS — F33.0 MILD RECURRENT MAJOR DEPRESSION (HCC): ICD-10-CM

## 2022-09-02 DIAGNOSIS — J41.0 SIMPLE CHRONIC BRONCHITIS (HCC): ICD-10-CM

## 2022-09-02 DIAGNOSIS — G89.29 CHRONIC MIDLINE LOW BACK PAIN WITHOUT SCIATICA: ICD-10-CM

## 2022-09-02 DIAGNOSIS — G04.81: ICD-10-CM

## 2022-09-02 DIAGNOSIS — G04.81 AUTOIMMUNE ENCEPHALITIS: ICD-10-CM

## 2022-09-02 DIAGNOSIS — M54.50 CHRONIC MIDLINE LOW BACK PAIN WITHOUT SCIATICA: ICD-10-CM

## 2022-09-02 DIAGNOSIS — G47.10 HYPERSOMNIA: Primary | ICD-10-CM

## 2022-09-02 DIAGNOSIS — G47.61 PERIODIC LIMB MOVEMENT DISORDER (PLMD): ICD-10-CM

## 2022-09-02 RX ORDER — HYDROCODONE BITARTRATE AND ACETAMINOPHEN 10; 325 MG/1; MG/1
1 TABLET ORAL EVERY 8 HOURS PRN
Qty: 60 TABLET | Refills: 0 | Status: SHIPPED | OUTPATIENT
Start: 2022-10-02 | End: 2022-11-01

## 2022-09-02 RX ORDER — FENTANYL 25 UG/H
1 PATCH TRANSDERMAL
Qty: 10 PATCH | Refills: 0 | Status: SHIPPED | OUTPATIENT
Start: 2022-09-02 | End: 2022-10-02

## 2022-09-02 RX ORDER — DEXTROAMPHETAMINE SACCHARATE, AMPHETAMINE ASPARTATE, DEXTROAMPHETAMINE SULFATE AND AMPHETAMINE SULFATE 5; 5; 5; 5 MG/1; MG/1; MG/1; MG/1
20 TABLET ORAL 2 TIMES DAILY
Qty: 60 TABLET | Refills: 0 | Status: SHIPPED | OUTPATIENT
Start: 2022-09-02 | End: 2022-10-02

## 2022-09-02 RX ORDER — FENTANYL 25 UG/H
1 PATCH TRANSDERMAL
Qty: 10 PATCH | Refills: 0 | Status: SHIPPED | OUTPATIENT
Start: 2022-11-01 | End: 2022-12-01

## 2022-09-02 RX ORDER — HYDROCODONE BITARTRATE AND ACETAMINOPHEN 10; 325 MG/1; MG/1
1 TABLET ORAL EVERY 8 HOURS PRN
Qty: 60 TABLET | Refills: 0 | Status: SHIPPED | OUTPATIENT
Start: 2022-11-01 | End: 2022-12-01

## 2022-09-02 RX ORDER — DEXTROAMPHETAMINE SACCHARATE, AMPHETAMINE ASPARTATE, DEXTROAMPHETAMINE SULFATE AND AMPHETAMINE SULFATE 5; 5; 5; 5 MG/1; MG/1; MG/1; MG/1
20 TABLET ORAL 2 TIMES DAILY
Qty: 60 TABLET | Refills: 0 | Status: SHIPPED | OUTPATIENT
Start: 2022-10-02 | End: 2022-11-01

## 2022-09-02 RX ORDER — DEXTROAMPHETAMINE SACCHARATE, AMPHETAMINE ASPARTATE, DEXTROAMPHETAMINE SULFATE AND AMPHETAMINE SULFATE 5; 5; 5; 5 MG/1; MG/1; MG/1; MG/1
20 TABLET ORAL 2 TIMES DAILY
Qty: 60 TABLET | Refills: 0 | Status: SHIPPED | OUTPATIENT
Start: 2022-11-01 | End: 2022-12-01

## 2022-09-02 RX ORDER — FENTANYL 25 UG/H
1 PATCH TRANSDERMAL
Qty: 10 PATCH | Refills: 0 | Status: SHIPPED | OUTPATIENT
Start: 2022-10-02 | End: 2022-11-01

## 2022-09-02 RX ORDER — HYDROCODONE BITARTRATE AND ACETAMINOPHEN 10; 325 MG/1; MG/1
1 TABLET ORAL EVERY 8 HOURS PRN
Qty: 60 TABLET | Refills: 0 | Status: SHIPPED | OUTPATIENT
Start: 2022-09-02 | End: 2022-10-02

## 2022-09-02 NOTE — PROGRESS NOTES
Sky Newsome is a 60year old female coming in for had concerns including Medication Request (adderall, narco, fentanyl). Subjective:   HPI regular 3 month follow up but more back pain thenm usual. Also genral pain all over    ROS: GENERAL HEALTH: feels well otherwise    Objective: There were no vitals taken for this visit. There is no height or weight on file to calculate BMI. Physical Exam  Constitutional:       Appearance: She is well-developed. HENT:      Head: Normocephalic and atraumatic. Pulmonary:      Effort: Pulmonary effort is normal. No respiratory distress. Musculoskeletal:         General: Normal range of motion. Cervical back: Normal range of motion. Skin:     Findings: No rash. Neurological:      Mental Status: She is alert and oriented to person, place, and time. Psychiatric:         Mood and Affect: Mood normal.         Behavior: Behavior normal.         Thought Content: Thought content normal.         Judgment: Judgment normal.             Ok to increse Norco to 10 from 5, # 60,     Assessment & Plan:   1. Hypersomnia (Primary)  Overview:  adderall 20 BID   Assessment & Plan:  Stable, continue present management     Orders:  -     Amphetamine-Dextroamphetamine; Take 1 tablet (20 mg total) by mouth 2 (two) times daily. Dispense: 60 tablet; Refill: 0  -     Amphetamine-Dextroamphetamine; Take 1 tablet (20 mg total) by mouth 2 (two) times daily. Dispense: 60 tablet; Refill: 0  -     Amphetamine-Dextroamphetamine; Take 1 tablet (20 mg total) by mouth 2 (two) times daily. Dispense: 60 tablet; Refill: 0  2. Chronic pain syndrome  Overview:  Due to chronic encephalitis, Off opiod since May 2017   Assessment & Plan:  Stable, continue present management     Orders:  -     fentaNYL; Place 1 patch onto the skin every third day. Dispense: 10 patch; Refill: 0  -     fentaNYL; Place 1 patch onto the skin every third day. Dispense: 10 patch;  Refill: 0  -     fentaNYL; Place 1 patch onto the skin every third day. Dispense: 10 patch; Refill: 0  -     HYDROcodone-Acetaminophen; Take 1 tablet by mouth every 8 (eight) hours as needed for Pain. Dispense: 60 tablet; Refill: 0  -     HYDROcodone-Acetaminophen; Take 1 tablet by mouth every 8 (eight) hours as needed for Pain. Dispense: 60 tablet; Refill: 0  -     HYDROcodone-Acetaminophen; Take 1 tablet by mouth every 8 (eight) hours as needed for Pain. Dispense: 60 tablet; Refill: 0  3. Chronic midline low back pain without sciatica  Assessment & Plan:  Stable, continue present management     Orders:  -     fentaNYL; Place 1 patch onto the skin every third day. Dispense: 10 patch; Refill: 0  -     fentaNYL; Place 1 patch onto the skin every third day. Dispense: 10 patch; Refill: 0  -     fentaNYL; Place 1 patch onto the skin every third day. Dispense: 10 patch; Refill: 0  -     HYDROcodone-Acetaminophen; Take 1 tablet by mouth every 8 (eight) hours as needed for Pain. Dispense: 60 tablet; Refill: 0  -     HYDROcodone-Acetaminophen; Take 1 tablet by mouth every 8 (eight) hours as needed for Pain. Dispense: 60 tablet; Refill: 0  -     HYDROcodone-Acetaminophen; Take 1 tablet by mouth every 8 (eight) hours as needed for Pain. Dispense: 60 tablet; Refill: 0  4. 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 Autoimmune encephalitis  Assessment & Plan:  Stable, continue present management     6. Primary narcolepsy with cataplexy  7. Mild recurrent major depression (HCC)  Overview:  Venlafaxine  daily, trazodone 1  Assessment & Plan:  Stable, continue present management     8. Mixed hyperlipidemia  Overview:  , no meds  Assessment & Plan:  Stable, Continue present management. Cholesterol Lowering Medications          atorvastatin 10 MG Oral Tab          9.  Simple chronic bronchitis ([facility] [address]  Overview:  flare in 2020 tx with Breo    Assessment & Plan:  Stable, continue present management     10. Hashimoto's thyroiditis  Assessment & Plan:  Stable, continue present management     11. Periodic limb movement disorder (PLMD)  Overview:  mirapex 0.125 started 5/1/2019     I am having David Sidney maintain her levothyroxine, traZODone, atorvastatin, venlafaxine ER, Sunosi, amphetamine-dextroamphetamine, amphetamine-dextroamphetamine, fentaNYL, fentaNYL, HYDROcodone-acetaminophen, HYDROcodone-acetaminophen, ondansetron, amphetamine-dextroamphetamine, HYDROcodone-acetaminophen, and fentaNYL. Stable function, still in a lot of pain but improving overall level of activity. History of. Benoquin movement disorder. Like her to come in for an annual physical as they will not do a sleep study without a visit within 12 months.   She has had gradual improvement and will continue to watch closely and reassess in 3 months hopefully with a face-to-face visit    Return in about 3 months (around 12/2/2022) for Annual physical.

## 2022-09-19 ENCOUNTER — TELEPHONE (OUTPATIENT)
Dept: FAMILY MEDICINE CLINIC | Facility: CLINIC | Age: 57
End: 2022-09-19

## 2022-09-19 DIAGNOSIS — Z12.31 VISIT FOR SCREENING MAMMOGRAM: ICD-10-CM

## 2022-09-19 DIAGNOSIS — Z00.00 LABORATORY EXAMINATION ORDERED AS PART OF A ROUTINE GENERAL MEDICAL EXAMINATION: ICD-10-CM

## 2022-09-19 DIAGNOSIS — E06.3 HASHIMOTO'S THYROIDITIS: ICD-10-CM

## 2022-09-19 DIAGNOSIS — D63.8 ANEMIA OF CHRONIC DISEASE: ICD-10-CM

## 2022-09-19 DIAGNOSIS — E78.2 MIXED HYPERLIPIDEMIA: ICD-10-CM

## 2022-09-19 DIAGNOSIS — J41.0 SIMPLE CHRONIC BRONCHITIS (HCC): Primary | ICD-10-CM

## 2022-09-19 DIAGNOSIS — E61.1 IRON DEFICIENCY: ICD-10-CM

## 2022-09-19 NOTE — TELEPHONE ENCOUNTER
Please enter lab orders for the patient's upcoming physical appointment. Physical scheduled: Your appointments     Date & Time Appointment Department [facility])    Nov 09, 2022  3:30 PM CST Adult Physical with Francisca Edwards MD [facility] [address], [address], [place]  ([address])    PLEASE NOTE - Most insurances allow a Complete Physical once every 366 days. Please schedule accordingly. Please arrive 15 minutes prior to your scheduled appointment. Please also bring your Insurance card, Photo ID, and your medication bottles or a list of your current medication. If you no longer require this appointment, please contact your physician office to cancel. [facility] [address] Aguayo [phone]         [facility])     The patient has been notified to complete fasting labs prior to their physical appointment.

## 2022-09-19 NOTE — TELEPHONE ENCOUNTER
1. Simple chronic bronchitis ([facility] [address] (Primary)  Overview:  flare in 2020 tx with Breo    2. Hashimoto's thyroiditis  -     Free T4, (Free Thyroxine); Future; Expected date: 09/19/2022  -     Assay, Thyroid Stim Hormone; Future; Expected date: 09/19/2022  -     Sed Rate, Westergren (Automated); Future; Expected date: 09/19/2022  -     C-Reactive Protein; Future; Expected date: 09/19/2022  3. Anemia of chronic disease  Overview:  Possible due to plasmaphoresis, nl EGD and colonoscopy  Orders:  -     Ferritin; Future; Expected date: 09/19/2022  -     Iron And Tibc; Future; Expected date: 09/19/2022  -     CBC, Platelet; No Differential; Future; Expected date: 09/19/2022  4. Mixed hyperlipidemia  Overview:  , no meds  Orders:  -     Comp Metabolic Panel (14); Future; Expected date: 09/19/2022  -     Lipid Panel; Future; Expected date: 09/19/2022  5. Visit for screening mammogram  -     [facility] SCREENING BILAT (CPT=77067); Future; Expected date: 09/19/2022  6. Laboratory examination ordered as part of a routine general medical examination  -     Comp Metabolic Panel (14); Future; Expected date: 09/19/2022  -     Lipid Panel; Future; Expected date: 09/19/2022  -     CBC, Platelet; No Differential; Future; Expected date: 09/19/2022  7. Iron deficiency  -     Ferritin; Future; Expected date: 09/19/2022  -     Iron And Tibc; Future; Expected date: 09/19/2022       OK to notify.  Thanks, Opal Aguiar MD

## 2022-11-08 ENCOUNTER — TELEPHONE (OUTPATIENT)
Dept: FAMILY MEDICINE CLINIC | Facility: CLINIC | Age: 57
End: 2022-11-08

## 2022-11-08 DIAGNOSIS — E06.3 HASHIMOTO'S THYROIDITIS: Primary | ICD-10-CM

## 2022-11-08 DIAGNOSIS — Z00.00 LABORATORY EXAMINATION ORDERED AS PART OF A ROUTINE GENERAL MEDICAL EXAMINATION: ICD-10-CM

## 2022-11-09 ENCOUNTER — TELEMEDICINE (OUTPATIENT)
Dept: FAMILY MEDICINE CLINIC | Facility: CLINIC | Age: 57
End: 2022-11-09

## 2022-11-09 DIAGNOSIS — G89.4 CHRONIC PAIN SYNDROME: ICD-10-CM

## 2022-11-09 DIAGNOSIS — J41.0 SIMPLE CHRONIC BRONCHITIS (HCC): Chronic | ICD-10-CM

## 2022-11-09 DIAGNOSIS — D80.1 HYPOGAMMAGLOBULINEMIA, ACQUIRED (HCC): ICD-10-CM

## 2022-11-09 DIAGNOSIS — F33.0 MILD RECURRENT MAJOR DEPRESSION (HCC): Chronic | ICD-10-CM

## 2022-11-09 DIAGNOSIS — M54.50 CHRONIC MIDLINE LOW BACK PAIN WITHOUT SCIATICA: ICD-10-CM

## 2022-11-09 DIAGNOSIS — R45.0 FEELING JITTERY: ICD-10-CM

## 2022-11-09 DIAGNOSIS — G47.10 HYPERSOMNIA: Chronic | ICD-10-CM

## 2022-11-09 DIAGNOSIS — E06.3 CHRONIC LYMPHOCYTIC THYROIDITIS: Chronic | ICD-10-CM

## 2022-11-09 DIAGNOSIS — G89.29 CHRONIC MIDLINE LOW BACK PAIN WITHOUT SCIATICA: ICD-10-CM

## 2022-11-09 DIAGNOSIS — F90.0 ADHD (ATTENTION DEFICIT HYPERACTIVITY DISORDER), INATTENTIVE TYPE: Chronic | ICD-10-CM

## 2022-11-09 DIAGNOSIS — E78.2 MIXED HYPERLIPIDEMIA: Chronic | ICD-10-CM

## 2022-11-09 DIAGNOSIS — E03.9 ACQUIRED HYPOTHYROIDISM: Chronic | ICD-10-CM

## 2022-11-09 DIAGNOSIS — Z00.00 ANNUAL PHYSICAL EXAM: Primary | ICD-10-CM

## 2022-11-09 DIAGNOSIS — G47.61 PERIODIC LIMB MOVEMENT DISORDER (PLMD): ICD-10-CM

## 2022-11-09 PROCEDURE — 99214 OFFICE O/P EST MOD 30 MIN: CPT | Performed by: FAMILY MEDICINE

## 2022-11-09 RX ORDER — HYDROCODONE BITARTRATE AND ACETAMINOPHEN 10; 325 MG/1; MG/1
1 TABLET ORAL EVERY 8 HOURS PRN
Qty: 60 TABLET | Refills: 0 | Status: SHIPPED | OUTPATIENT
Start: 2023-01-08 | End: 2023-02-07

## 2022-11-09 RX ORDER — HYDROCODONE BITARTRATE AND ACETAMINOPHEN 10; 325 MG/1; MG/1
1 TABLET ORAL EVERY 8 HOURS PRN
Qty: 60 TABLET | Refills: 0 | Status: SHIPPED | OUTPATIENT
Start: 2022-12-09 | End: 2023-01-08

## 2022-11-09 RX ORDER — FENTANYL 25 UG/H
1 PATCH TRANSDERMAL
Qty: 10 PATCH | Refills: 0 | Status: SHIPPED | OUTPATIENT
Start: 2023-01-08 | End: 2023-02-07

## 2022-11-09 RX ORDER — DEXTROAMPHETAMINE SACCHARATE, AMPHETAMINE ASPARTATE, DEXTROAMPHETAMINE SULFATE AND AMPHETAMINE SULFATE 5; 5; 5; 5 MG/1; MG/1; MG/1; MG/1
20 TABLET ORAL 2 TIMES DAILY
Qty: 60 TABLET | Refills: 0 | Status: SHIPPED | OUTPATIENT
Start: 2022-12-09 | End: 2023-01-08

## 2022-11-09 RX ORDER — FENTANYL 25 UG/H
1 PATCH TRANSDERMAL
Qty: 10 PATCH | Refills: 0 | Status: SHIPPED | OUTPATIENT
Start: 2022-11-09 | End: 2022-12-09

## 2022-11-09 RX ORDER — BUPROPION HYDROCHLORIDE 300 MG/1
300 TABLET ORAL DAILY
Qty: 90 TABLET | Refills: 1 | Status: SHIPPED | OUTPATIENT
Start: 2022-11-09

## 2022-11-09 RX ORDER — HYDROCODONE BITARTRATE AND ACETAMINOPHEN 10; 325 MG/1; MG/1
1 TABLET ORAL EVERY 8 HOURS PRN
Qty: 60 TABLET | Refills: 0 | Status: SHIPPED | OUTPATIENT
Start: 2022-11-09 | End: 2022-12-09

## 2022-11-09 RX ORDER — DEXTROAMPHETAMINE SACCHARATE, AMPHETAMINE ASPARTATE, DEXTROAMPHETAMINE SULFATE AND AMPHETAMINE SULFATE 5; 5; 5; 5 MG/1; MG/1; MG/1; MG/1
20 TABLET ORAL 2 TIMES DAILY
Qty: 60 TABLET | Refills: 0 | Status: SHIPPED | OUTPATIENT
Start: 2023-01-08 | End: 2023-02-07

## 2022-11-09 RX ORDER — FENTANYL 25 UG/H
1 PATCH TRANSDERMAL
Qty: 10 PATCH | Refills: 0 | Status: SHIPPED | OUTPATIENT
Start: 2022-12-09 | End: 2023-01-08

## 2022-11-09 RX ORDER — DEXTROAMPHETAMINE SACCHARATE, AMPHETAMINE ASPARTATE, DEXTROAMPHETAMINE SULFATE AND AMPHETAMINE SULFATE 5; 5; 5; 5 MG/1; MG/1; MG/1; MG/1
20 TABLET ORAL 2 TIMES DAILY
Qty: 60 TABLET | Refills: 0 | Status: SHIPPED | OUTPATIENT
Start: 2022-11-09 | End: 2022-12-09

## 2022-11-09 NOTE — PROGRESS NOTES
Tita Lindquist is a 59year old female coming in for had concerns including Medication Request (Adderall, fentanyl, hydrocodone 10). Subjective:   HPI low energy, rescheduled to video from in [place]   Off senosys. Because of cost.     ROS: GENERAL HEALTH: feels well otherwise  This visit is conducted using Telemedicine with live, interactive video and audio. Patient has been referred to the [facility] website at [email or web address] to review the yearly Consent to Treat document. Patient understands and accepts financial responsibility for any deductible, co-insurance and/or co-pays associated with this service. Objective: There were no vitals taken for this visit. There is no height or weight on file to calculate BMI. Physical Exam  Constitutional:       Appearance: She is well-developed. HENT:      Head: Normocephalic and atraumatic. Pulmonary:      Effort: Pulmonary effort is normal. No respiratory distress. Musculoskeletal:         General: Normal range of motion. Cervical back: Normal range of motion. Skin:     Findings: No rash. Neurological:      Mental Status: She is alert and oriented to person, place, and time. Psychiatric:         Mood and Affect: Mood normal.         Behavior: Behavior normal.         Thought Content: Thought content normal.         Judgment: Judgment normal.           Assessment & Plan:   1. Annual physical exam (Primary)  2. Mixed hyperlipidemia  Overview:  , no meds  Assessment & Plan:  Stable, Continue present management. Cholesterol Lowering Medications          atorvastatin 10 MG Oral Tab          3. Simple chronic bronchitis ([address]  Overview:  flare in 2020 tx with Breo    Assessment & Plan:  Stable, continue present management     4. Acquired hypothyroidism  Overview:  100 levothyroxine 4/11/2016  Assessment & Plan:  Stable, Continue present management.     Thyroid  (most recent labs)   Lab Results   Component Value Date/Time    TSH 0.719 07/15/2020 05:10 PM    T4F 1.3 07/15/2020 05:10 PM         Endocrine Medications          levothyroxine 100 MCG Oral Tab          5. Chronic lymphocytic thyroiditis  Overview:  Dx at [facility] in about 2005  Assessment & Plan:  Stable, continue present management     6. Mild recurrent major depression (HCC)  Overview:  Venlafaxine  daily, trazodone 1  Assessment & Plan:  Stable, continue present management   7. ADHD (attention deficit hyperactivity disorder), inattentive type  Overview:  adderall 20 BID, in remission for sx  Assessment & Plan:  Stable, continue present management     8. Feeling jittery  Overview:  Ondansetron  9. Hypogammaglobulinemia, acquired [facility])  Overview:  Lab test 2016  Assessment & Plan:  Stable, continue present management     10. Periodic limb movement disorder (PLMD)  Overview:  mirapex 0.125 started 5/1/2019  Assessment & Plan:  Stable, continue present management     11. Hypersomnia  Overview:  adderall 20 BID   Assessment & Plan:  Stable, continue present management     Orders:  -     Amphetamine-Dextroamphetamine; Take 1 tablet (20 mg total) by mouth 2 (two) times daily. Dispense: 60 tablet; Refill: 0  -     Amphetamine-Dextroamphetamine; Take 1 tablet (20 mg total) by mouth 2 (two) times daily. Dispense: 60 tablet; Refill: 0  -     Amphetamine-Dextroamphetamine; Take 1 tablet (20 mg total) by mouth 2 (two) times daily. Dispense: 60 tablet; Refill: 0  12. Chronic pain syndrome  Overview:  Due to chronic encephalitis, Off opiod since May 2017   Assessment & Plan:  Stable, continue present management     Orders:  -     fentaNYL; Place 1 patch onto the skin every third day. Dispense: 10 patch; Refill: 0  -     HYDROcodone-Acetaminophen; Take 1 tablet by mouth every 8 (eight) hours as needed for Pain. Dispense: 60 tablet; Refill: 0  -     fentaNYL; Place 1 patch onto the skin every third day. Dispense: 10 patch;  Refill: 0  -     fentaNYL; Place 1 patch onto the skin every third day.  Dispense: 10 patch; Refill: 0  -     HYDROcodone-Acetaminophen; Take 1 tablet by mouth every 8 (eight) hours as needed for Pain. Dispense: 60 tablet; Refill: 0  -     HYDROcodone-Acetaminophen; Take 1 tablet by mouth every 8 (eight) hours as needed for Pain. Dispense: 60 tablet; Refill: 0  13. Chronic midline low back pain without sciatica  -     fentaNYL; Place 1 patch onto the skin every third day. Dispense: 10 patch; Refill: 0  -     HYDROcodone-Acetaminophen; Take 1 tablet by mouth every 8 (eight) hours as needed for Pain. Dispense: 60 tablet; Refill: 0  -     fentaNYL; Place 1 patch onto the skin every third day. Dispense: 10 patch; Refill: 0  -     fentaNYL; Place 1 patch onto the skin every third day. Dispense: 10 patch; Refill: 0  -     HYDROcodone-Acetaminophen; Take 1 tablet by mouth every 8 (eight) hours as needed for Pain. Dispense: 60 tablet; Refill: 0  -     HYDROcodone-Acetaminophen; Take 1 tablet by mouth every 8 (eight) hours as needed for Pain. Dispense: 60 tablet; Refill: 0  Other orders  -     buPROPion HCl ER (XL); Take 1 tablet (300 mg total) by mouth daily. Dispense: 90 tablet; Refill: 1   trial Wellbutrin since it is covverd. cosider decrease effexor if no change. Finish current meds bryon  I am having Patience Deangelo start on buPROPion ER. I am also having her maintain her levothyroxine, traZODone, atorvastatin, venlafaxine ER, Sunosi, ondansetron, amphetamine-dextroamphetamine, fentaNYL, HYDROcodone-acetaminophen, amphetamine-dextroamphetamine, fentaNYL, amphetamine-dextroamphetamine, fentaNYL, HYDROcodone-acetaminophen, and HYDROcodone-acetaminophen. Return in about 3 months (around 2/9/2023).     Marylen Channel, MD, 11/9/2022, 3:53 PM

## 2022-11-09 NOTE — TELEPHONE ENCOUNTER
1. Hashimoto's thyroiditis (Primary)  -     Vitamin D; Future; Expected date: 11/08/2022  2. Laboratory examination ordered as part of a routine general medical examination  -     Vitamin D; Future; Expected date: 11/08/2022       OK to notify.  Thanks, Ani Brannon MD

## 2022-11-30 ENCOUNTER — TELEPHONE (OUTPATIENT)
Dept: FAMILY MEDICINE CLINIC | Facility: CLINIC | Age: 57
End: 2022-11-30

## 2022-11-30 NOTE — TELEPHONE ENCOUNTER
Please check on this.   11/9 prescription was sent and confirmed at [facility] on 11 9 at 4:02 PM.  That should still be good for 30 days and the 12 9 is the next month 1

## 2022-11-30 NOTE — TELEPHONE ENCOUNTER
Patient is calling she said she is out of the [address] and her dates are not lining up with her others she is saying. She picked it up on the 10/31/22 and should be getting it on 11/30/22 but it is saying 12/9/22. Please call Leeann.

## 2022-11-30 NOTE — TELEPHONE ENCOUNTER
Per dispense report, previous 3 month refill from 9/2 was filled 11/1. Patient's December RX is dated for 12/9. Please review and advise.

## 2022-12-02 NOTE — TELEPHONE ENCOUNTER
HYDROcodone-Acetaminophen  [place]   Dispensed Written Strength Quantity Refills Days Supply Provider Pharmacy   [facility] 11/01/2022 09/02/2022  mg 60 tablet  20 Yesi Coelho MD [address] [place] [address] [id number]   [facility] 10/02/2022 09/02/2022  mg 60 tablet  20 Yesi Coelho MD [address] [place] [address] Street [id number]   [facility] 09/02/2022 09/02/2022  mg 60 tablet  20 Yesi Coelho MD [address] [place] [address] Street [id number]   [facility] 08/17/2022 08/17/2022 5-325 mg 60 tablet  20 [id number]   [facility] 07/24/2022 05/25/2022 5-325 mg 60 tablet  20 Yesi Coelho MD [address] [place] [address] [id number]   [facility] 06/25/2022 05/25/2022 5-325 mg 60 tablet  20 Yesi Coelho MD [address] [place] [address] [id number]   She last picked up med on 11/01/22 (this script was the one written on 09/02/22) the current one has a fill date of 11/9/2022. Called [facility] and they said that she is able to  her script now they will get it ready. Called and told patient of this.

## 2022-12-14 RX ORDER — TRAZODONE HYDROCHLORIDE 100 MG/1
TABLET ORAL
Qty: 90 TABLET | Refills: 3 | Status: SHIPPED | OUTPATIENT
Start: 2022-12-14

## 2023-01-17 DIAGNOSIS — E78.2 MIXED HYPERLIPIDEMIA: Chronic | ICD-10-CM

## 2023-01-20 RX ORDER — ATORVASTATIN CALCIUM 10 MG/1
TABLET, FILM COATED ORAL
Qty: 90 TABLET | Refills: 0 | Status: SHIPPED | OUTPATIENT
Start: 2023-01-20

## 2023-02-03 ENCOUNTER — TELEMEDICINE (OUTPATIENT)
Dept: FAMILY MEDICINE CLINIC | Facility: CLINIC | Age: 58
End: 2023-02-03
Payer: COMMERCIAL

## 2023-02-03 DIAGNOSIS — F33.0 MILD RECURRENT MAJOR DEPRESSION (HCC): Chronic | ICD-10-CM

## 2023-02-03 DIAGNOSIS — G47.61 PERIODIC LIMB MOVEMENT DISORDER (PLMD): ICD-10-CM

## 2023-02-03 DIAGNOSIS — M54.50 CHRONIC MIDLINE LOW BACK PAIN WITHOUT SCIATICA: ICD-10-CM

## 2023-02-03 DIAGNOSIS — E78.2 MIXED HYPERLIPIDEMIA: Chronic | ICD-10-CM

## 2023-02-03 DIAGNOSIS — E06.3 CHRONIC LYMPHOCYTIC THYROIDITIS: Chronic | ICD-10-CM

## 2023-02-03 DIAGNOSIS — G89.29 CHRONIC MIDLINE LOW BACK PAIN WITHOUT SCIATICA: ICD-10-CM

## 2023-02-03 DIAGNOSIS — E06.3 HASHIMOTO'S THYROIDITIS: ICD-10-CM

## 2023-02-03 DIAGNOSIS — G04.81: Chronic | ICD-10-CM

## 2023-02-03 DIAGNOSIS — G89.4 CHRONIC PAIN SYNDROME: Primary | ICD-10-CM

## 2023-02-03 DIAGNOSIS — F90.0 ADHD (ATTENTION DEFICIT HYPERACTIVITY DISORDER), INATTENTIVE TYPE: Chronic | ICD-10-CM

## 2023-02-03 DIAGNOSIS — E03.9 ACQUIRED HYPOTHYROIDISM: Chronic | ICD-10-CM

## 2023-02-03 DIAGNOSIS — J41.0 SIMPLE CHRONIC BRONCHITIS (HCC): Chronic | ICD-10-CM

## 2023-02-03 DIAGNOSIS — D80.1 HYPOGAMMAGLOBULINEMIA, ACQUIRED (HCC): ICD-10-CM

## 2023-02-03 DIAGNOSIS — G47.10 HYPERSOMNIA: Chronic | ICD-10-CM

## 2023-02-03 PROCEDURE — 99214 OFFICE O/P EST MOD 30 MIN: CPT | Performed by: FAMILY MEDICINE

## 2023-02-03 RX ORDER — HYDROCODONE BITARTRATE AND ACETAMINOPHEN 10; 325 MG/1; MG/1
1 TABLET ORAL EVERY 8 HOURS PRN
Qty: 60 TABLET | Refills: 0 | Status: SHIPPED | OUTPATIENT
Start: 2023-03-05 | End: 2023-04-04

## 2023-02-03 RX ORDER — TRAZODONE HYDROCHLORIDE 100 MG/1
TABLET ORAL NIGHTLY PRN
Qty: 135 TABLET | Refills: 3 | Status: SHIPPED | OUTPATIENT
Start: 2023-02-03

## 2023-02-03 RX ORDER — DEXTROAMPHETAMINE SACCHARATE, AMPHETAMINE ASPARTATE, DEXTROAMPHETAMINE SULFATE AND AMPHETAMINE SULFATE 5; 5; 5; 5 MG/1; MG/1; MG/1; MG/1
20 TABLET ORAL 2 TIMES DAILY
Qty: 60 TABLET | Refills: 0 | Status: SHIPPED | OUTPATIENT
Start: 2023-02-03 | End: 2023-03-05

## 2023-02-03 RX ORDER — FENTANYL 25 UG/H
1 PATCH TRANSDERMAL
Qty: 10 PATCH | Refills: 0 | Status: SHIPPED | OUTPATIENT
Start: 2023-02-03 | End: 2023-03-05

## 2023-02-03 RX ORDER — HYDROCODONE BITARTRATE AND ACETAMINOPHEN 10; 325 MG/1; MG/1
1 TABLET ORAL EVERY 8 HOURS PRN
Qty: 60 TABLET | Refills: 0 | Status: SHIPPED | OUTPATIENT
Start: 2023-02-03 | End: 2023-03-05

## 2023-02-03 RX ORDER — HYDROCODONE BITARTRATE AND ACETAMINOPHEN 10; 325 MG/1; MG/1
1 TABLET ORAL EVERY 8 HOURS PRN
Qty: 60 TABLET | Refills: 0 | Status: SHIPPED | OUTPATIENT
Start: 2023-04-04 | End: 2023-05-04

## 2023-02-03 RX ORDER — FENTANYL 25 UG/H
1 PATCH TRANSDERMAL
Qty: 10 PATCH | Refills: 0 | Status: SHIPPED | OUTPATIENT
Start: 2023-03-05 | End: 2023-04-04

## 2023-02-03 RX ORDER — FENTANYL 25 UG/H
1 PATCH TRANSDERMAL
Qty: 10 PATCH | Refills: 0 | Status: SHIPPED | OUTPATIENT
Start: 2023-04-04 | End: 2023-05-04

## 2023-02-03 RX ORDER — DEXTROAMPHETAMINE SACCHARATE, AMPHETAMINE ASPARTATE, DEXTROAMPHETAMINE SULFATE AND AMPHETAMINE SULFATE 5; 5; 5; 5 MG/1; MG/1; MG/1; MG/1
20 TABLET ORAL 2 TIMES DAILY
Qty: 60 TABLET | Refills: 0 | Status: SHIPPED | OUTPATIENT
Start: 2023-04-04 | End: 2023-05-04

## 2023-02-03 RX ORDER — LEVOTHYROXINE SODIUM 0.1 MG/1
100 TABLET ORAL
Qty: 90 TABLET | Refills: 3 | Status: SHIPPED | OUTPATIENT
Start: 2023-02-03

## 2023-02-03 RX ORDER — DEXTROAMPHETAMINE SACCHARATE, AMPHETAMINE ASPARTATE, DEXTROAMPHETAMINE SULFATE AND AMPHETAMINE SULFATE 5; 5; 5; 5 MG/1; MG/1; MG/1; MG/1
20 TABLET ORAL 2 TIMES DAILY
Qty: 60 TABLET | Refills: 0 | Status: SHIPPED | OUTPATIENT
Start: 2023-03-05 | End: 2023-04-04

## 2023-02-03 NOTE — PROGRESS NOTES
Tita Lindquist is a 59year old female coming in for had concerns including Fatigue (3 month adderall f/u) and Pain (3 month Fentanyl f/u). Subjective:   Pain     dogin well, normal 3 month follow up. Stable pain and function. Wellbutrin did not help with fatigue but did a lot of pain relief. Pain was 9/10 and now more of 6/10. Still sleeping 20 hours a day. ROS: GENERAL HEALTH: feels well otherwise  This visit is conducted using Telemedicine with live, interactive video and audio. Patient has been referred to the [facility] website at [email or web address] to review the yearly Consent to Treat document. Patient understands and accepts financial responsibility for any deductible, co-insurance and/or co-pays associated with this service. Stable pain and stable adhd, needs labs overall     Objective: There were no vitals taken for this visit. There is no height or weight on file to calculate BMI. Physical Exam  Constitutional:       Appearance: She is well-developed. HENT:      Head: Normocephalic and atraumatic. Pulmonary:      Effort: Pulmonary effort is normal. No respiratory distress. Musculoskeletal:         General: Normal range of motion. Cervical back: Normal range of motion. Skin:     Findings: No rash. Neurological:      Mental Status: She is alert and oriented to person, place, and time. Psychiatric:         Mood and Affect: Mood normal.         Behavior: Behavior normal.         Thought Content: Thought content normal.         Judgment: Judgment normal.           Assessment & Plan:   1. Chronic pain syndrome (Primary)  Overview:  Due to chronic encephalitis, Off opiod since May 2017   Assessment & Plan:  Stable, continue present management     Orders:  -     fentaNYL; Place 1 patch onto the skin every third day. Dispense: 10 patch; Refill: 0  -     fentaNYL; Place 1 patch onto the skin every third day. Dispense: 10 patch; Refill: 0  -     HYDROcodone-Acetaminophen;  Take 1 tablet by mouth every 8 (eight) hours as needed for Pain. Dispense: 60 tablet; Refill: 0  -     fentaNYL; Place 1 patch onto the skin every third day. Dispense: 10 patch; Refill: 0  -     HYDROcodone-Acetaminophen; Take 1 tablet by mouth every 8 (eight) hours as needed for Pain. Dispense: 60 tablet; Refill: 0  -     HYDROcodone-Acetaminophen; Take 1 tablet by mouth every 8 (eight) hours as needed for Pain. Dispense: 60 tablet; Refill: 0  2. Chronic lymphocytic thyroiditis  Overview:  Dx at [facility] in about 2005  Assessment & Plan:  Stable, continue present management     3. Acquired hypothyroidism  Overview:  100 levothyroxine 4/11/2016  Assessment & Plan:  Stable, Continue present management. Thyroid  (most recent labs)   Lab Results   Component Value Date/Time    TSH 0.719 07/15/2020 05:10 PM    T4F 1.3 07/15/2020 05:10 PM         Endocrine Medications          levothyroxine 100 MCG Oral Tab          4. Simple chronic bronchitis ([address]  Overview:  flare in 2020 tx with Breo    Assessment & Plan:  Stable, continue present management     5. Mixed hyperlipidemia  Overview:  , no meds  Assessment & Plan:  Stable, Continue present management. Cholesterol Lowering Medications          ATORVASTATIN 10 MG Oral Tab          6. [facility]ammaglobulinemia, [facility]  Overview:  Lab test 2016  Assessment & Plan:  Stable, continue present management     7. Mild recurrent major depression (HCC)  Overview:  Venlafaxine  daily, trazodone 1  Assessment & Plan:  Stable, continue present management     8. ADHD (attention deficit hyperactivity disorder), inattentive type  Overview:  adderall 20 BID, in remission for sx  Assessment & Plan:  Stable, continue present management     9. Periodic limb movement disorder (PLMD)  Overview:  mirapex 0.125 started 5/1/2019  Assessment & Plan:  Stable, continue present management     10.  Hypersomnia  Overview:  adderall 20 BID   Orders:  -     Amphetamine-Dextroamphetamine; Take 1 tablet (20 mg total) by mouth 2 (two) times daily. Dispense: 60 tablet; Refill: 0  -     Amphetamine-Dextroamphetamine; Take 1 tablet (20 mg total) by mouth 2 (two) times daily. Dispense: 60 tablet; Refill: 0  -     Amphetamine-Dextroamphetamine; Take 1 tablet (20 mg total) by mouth 2 (two) times daily. Dispense: 60 tablet; Refill: 0  11. Chronic midline low back pain without sciatica  -     fentaNYL; Place 1 patch onto the skin every third day. Dispense: 10 patch; Refill: 0  -     fentaNYL; Place 1 patch onto the skin every third day. Dispense: 10 patch; Refill: 0  -     HYDROcodone-Acetaminophen; Take 1 tablet by mouth every 8 (eight) hours as needed for Pain. Dispense: 60 tablet; Refill: 0  -     fentaNYL; Place 1 patch onto the skin every third day. Dispense: 10 patch; Refill: 0  -     HYDROcodone-Acetaminophen; Take 1 tablet by mouth every 8 (eight) hours as needed for Pain. Dispense: 60 tablet; Refill: 0  -     HYDROcodone-Acetaminophen; Take 1 tablet by mouth every 8 (eight) hours as needed for Pain. 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  Dispense: 90 tablet; Refill: 3  13. Hashimoto's thyroiditis  -     Levothyroxine Sodium; Take 1 tablet (100 mcg total) by mouth before breakfast.  Dispense: 90 tablet; Refill: 3  Other orders  -     traZODone HCl; Take 1-1.5 tablets (100-150 mg total) by mouth nightly as needed for Sleep. Dispense: 135 tablet;  Refill: 3     I am having Baljeet Arzate maintain her levothyroxine, venlafaxine ER, Sunosi, ondansetron, buPROPion ER, amphetamine-dextroamphetamine, fentaNYL, HYDROcodone-acetaminophen, amphetamine-dextroamphetamine, fentaNYL, amphetamine-dextroamphetamine, fentaNYL, HYDROcodone-acetaminophen, HYDROcodone-acetaminophen, traZODone, and atorvastatin. Return in about 3 months (around 5/3/2023) for recheck.     Magno Godinez MD, 2/3/2023, 3:42 PM

## 2023-02-18 ENCOUNTER — TELEPHONE (OUTPATIENT)
Dept: FAMILY MEDICINE CLINIC | Facility: CLINIC | Age: 58
End: 2023-02-18

## 2023-02-18 DIAGNOSIS — G89.4 CHRONIC PAIN SYNDROME: ICD-10-CM

## 2023-02-18 DIAGNOSIS — M54.50 CHRONIC MIDLINE LOW BACK PAIN WITHOUT SCIATICA: ICD-10-CM

## 2023-02-18 DIAGNOSIS — G89.29 CHRONIC MIDLINE LOW BACK PAIN WITHOUT SCIATICA: ICD-10-CM

## 2023-02-18 RX ORDER — FENTANYL 25 UG/H
1 PATCH TRANSDERMAL
Qty: 10 PATCH | Refills: 0 | Status: SHIPPED | OUTPATIENT
Start: 2023-02-18 | End: 2023-03-20

## 2023-02-18 NOTE — TELEPHONE ENCOUNTER
Received call from patient that pharmacy is out of her fentanyl patches and will not get them un until 2/21/23. Would like script for this month sent to new pharmacy. [place]- checked prior to prescribing, no script filled yet for February. Will change to new pharmacy as requested for this month only. Future refills to come from Dr. Alexander Sheets. Verbalized understanding of instructions and agreeable to this plan of care.

## 2023-03-14 ENCOUNTER — TELEPHONE (OUTPATIENT)
Dept: FAMILY MEDICINE CLINIC | Facility: CLINIC | Age: 58
End: 2023-03-14

## 2023-03-15 NOTE — TELEPHONE ENCOUNTER
Yelean Rooney from [facility] returned call and was advised of below. Will need signed copy of notes and allergy list as patient mentioned previous issue with lidocaine - noted on list.     Placed to be signed by Dr. Faye Bailey then faxed.

## 2023-03-15 NOTE — TELEPHONE ENCOUNTER
She is okay for surgery with either or both of these medications. Is there a standard form they need me to sign or just a copy of this chart note?

## 2023-03-15 NOTE — TELEPHONE ENCOUNTER
I had not noted the 8/2021 change to lidocaine she needs a follow up to discuss before I can send the letter.   I thought the question was about the Epi, not the lidocaine    Audelia Gutiérrez MD

## 2023-03-15 NOTE — TELEPHONE ENCOUNTER
Called [facility] and LMOM to call back to see if they have a form or will a copy of the last visit note do.

## 2023-03-17 ENCOUNTER — PATIENT MESSAGE (OUTPATIENT)
Dept: FAMILY MEDICINE CLINIC | Facility: CLINIC | Age: 58
End: 2023-03-17

## 2023-03-18 NOTE — TELEPHONE ENCOUNTER
From: Media Lawrence  To: Jose Alfredo Zavala MD  Sent: 3/17/2023 6:35 PM CDT  Subject: Fentanyl    Hi Dr. Latonia Hawley,  I just spoke to my insurance company regarding the price increase of the Fentanyl Patches. They went from a tier 2 (cost of $5) to a tier 4 (cost $70.63). They notified your office regarding a price exemption. This would reduce the cost to $47. I was informed I could substitute the Fentanyl Patches to a preferred generic drug ($5). I do not know if the drugs recommended would be sufficient. Here are the three options: Hydromorphone Hcl Tablets, Morphine Sulfate or Codeine Sulfate. I would like to discuss this at our April 26 appointment. Thank you.  Rivka Petty

## 2023-04-14 ENCOUNTER — TELEMEDICINE (OUTPATIENT)
Dept: FAMILY MEDICINE CLINIC | Facility: CLINIC | Age: 58
End: 2023-04-14
Payer: COMMERCIAL

## 2023-04-14 DIAGNOSIS — G89.29 CHRONIC MIDLINE LOW BACK PAIN WITHOUT SCIATICA: ICD-10-CM

## 2023-04-14 DIAGNOSIS — E06.3 CHRONIC LYMPHOCYTIC THYROIDITIS: ICD-10-CM

## 2023-04-14 DIAGNOSIS — E03.9 ACQUIRED HYPOTHYROIDISM: ICD-10-CM

## 2023-04-14 DIAGNOSIS — F33.0 MILD RECURRENT MAJOR DEPRESSION (HCC): ICD-10-CM

## 2023-04-14 DIAGNOSIS — D80.1 HYPOGAMMAGLOBULINEMIA, ACQUIRED (HCC): ICD-10-CM

## 2023-04-14 DIAGNOSIS — G89.4 CHRONIC PAIN SYNDROME: ICD-10-CM

## 2023-04-14 DIAGNOSIS — Z12.4 SCREENING FOR CERVICAL CANCER: Primary | ICD-10-CM

## 2023-04-14 DIAGNOSIS — G47.10 HYPERSOMNIA: Chronic | ICD-10-CM

## 2023-04-14 DIAGNOSIS — M54.50 CHRONIC MIDLINE LOW BACK PAIN WITHOUT SCIATICA: ICD-10-CM

## 2023-04-14 DIAGNOSIS — G47.61 PERIODIC LIMB MOVEMENT DISORDER (PLMD): ICD-10-CM

## 2023-04-14 DIAGNOSIS — J41.0 SIMPLE CHRONIC BRONCHITIS (HCC): ICD-10-CM

## 2023-04-14 PROCEDURE — 99214 OFFICE O/P EST MOD 30 MIN: CPT | Performed by: FAMILY MEDICINE

## 2023-04-14 RX ORDER — HYDROCODONE BITARTRATE AND ACETAMINOPHEN 10; 325 MG/1; MG/1
1 TABLET ORAL EVERY 12 HOURS PRN
Qty: 60 TABLET | Refills: 0 | Status: SHIPPED | OUTPATIENT
Start: 2023-05-14 | End: 2023-06-13

## 2023-04-14 RX ORDER — FENTANYL 25 UG/H
1 PATCH TRANSDERMAL
Qty: 10 PATCH | Refills: 0 | Status: SHIPPED | OUTPATIENT
Start: 2023-04-14 | End: 2023-05-14

## 2023-04-14 RX ORDER — DEXTROAMPHETAMINE SACCHARATE, AMPHETAMINE ASPARTATE, DEXTROAMPHETAMINE SULFATE AND AMPHETAMINE SULFATE 5; 5; 5; 5 MG/1; MG/1; MG/1; MG/1
20 TABLET ORAL 2 TIMES DAILY
Qty: 60 TABLET | Refills: 0 | Status: SHIPPED | OUTPATIENT
Start: 2023-05-14 | End: 2023-06-13

## 2023-04-14 RX ORDER — DEXTROAMPHETAMINE SACCHARATE, AMPHETAMINE ASPARTATE, DEXTROAMPHETAMINE SULFATE AND AMPHETAMINE SULFATE 5; 5; 5; 5 MG/1; MG/1; MG/1; MG/1
20 TABLET ORAL 2 TIMES DAILY
Qty: 60 TABLET | Refills: 0 | Status: SHIPPED | OUTPATIENT
Start: 2023-04-14 | End: 2023-05-14

## 2023-04-14 RX ORDER — BUPROPION HYDROCHLORIDE 300 MG/1
300 TABLET ORAL DAILY
Qty: 90 TABLET | Refills: 3 | Status: SHIPPED | OUTPATIENT
Start: 2023-04-14

## 2023-04-14 RX ORDER — FENTANYL 25 UG/H
1 PATCH TRANSDERMAL
Qty: 10 PATCH | Refills: 0 | Status: SHIPPED | OUTPATIENT
Start: 2023-06-13 | End: 2023-07-13

## 2023-04-14 RX ORDER — HYDROCODONE BITARTRATE AND ACETAMINOPHEN 10; 325 MG/1; MG/1
1 TABLET ORAL EVERY 12 HOURS PRN
Qty: 60 TABLET | Refills: 0 | Status: SHIPPED | OUTPATIENT
Start: 2023-06-13 | End: 2023-07-13

## 2023-04-14 RX ORDER — BUPROPION HYDROCHLORIDE 450 MG/1
450 TABLET, FILM COATED, EXTENDED RELEASE ORAL DAILY
Qty: 90 TABLET | Refills: 1 | Status: SHIPPED | OUTPATIENT
Start: 2023-04-14

## 2023-04-14 RX ORDER — FENTANYL 25 UG/H
1 PATCH TRANSDERMAL
Qty: 10 PATCH | Refills: 0 | Status: SHIPPED | OUTPATIENT
Start: 2023-05-14 | End: 2023-06-13

## 2023-04-14 RX ORDER — DEXTROAMPHETAMINE SACCHARATE, AMPHETAMINE ASPARTATE, DEXTROAMPHETAMINE SULFATE AND AMPHETAMINE SULFATE 5; 5; 5; 5 MG/1; MG/1; MG/1; MG/1
20 TABLET ORAL 2 TIMES DAILY
Qty: 60 TABLET | Refills: 0 | Status: SHIPPED | OUTPATIENT
Start: 2023-06-13 | End: 2023-07-13

## 2023-04-14 RX ORDER — HYDROCODONE BITARTRATE AND ACETAMINOPHEN 10; 325 MG/1; MG/1
1 TABLET ORAL EVERY 8 HOURS PRN
Qty: 90 TABLET | Refills: 0 | Status: SHIPPED | OUTPATIENT
Start: 2023-04-14 | End: 2023-05-14

## 2023-04-14 NOTE — PROGRESS NOTES
Sha Porter is a 59year old female coming in for had concerns including Medication Request (Follow up ). Subjective:   HPI more pain lately,   Constant pain like bolt of lightening with movement. Usually 8/10 but now more intense. ROS: GENERAL HEALTH: feels well otherwise  This visit is conducted using Telemedicine with live, interactive video and audio. Patient has been referred to the [facility] website at [email or web address] to review the yearly Consent to Treat document. Patient understands and accepts financial responsibility for any deductible, co-insurance and/or co-pays associated with this service. She is working on some dental work that is affecting her jaw pain and back. In.  Currently finals not covered and we discussed options including Buprenedrone in the future as the rules are changing. Objective: There were no vitals taken for this visit. There is no height or weight on file to calculate BMI. Physical Exam  Constitutional:       Appearance: She is well-developed. HENT:      Head: Normocephalic and atraumatic. Pulmonary:      Effort: Pulmonary effort is normal. No respiratory distress. Musculoskeletal:         General: Normal range of motion. Cervical back: Normal range of motion. Skin:     Findings: No rash. Neurological:      Mental Status: She is alert and oriented to person, place, and time. Psychiatric:         Mood and Affect: Mood normal.         Behavior: Behavior normal.         Thought Content: Thought content normal.         Judgment: Judgment normal.           Assessment & Plan:   1. Screening for cervical cancer (Primary)  2. Mild recurrent major depression (HCC)  Overview:  Venlafaxine  daily, trazodone 1  Assessment & Plan:  Stable, continue present management     Orders:  -     buPROPion HCl ER (XL); Take 450 mg by mouth daily. Dispense: 90 tablet; Refill: 1  3.  Chronic pain syndrome  Overview:  Due to chronic encephalitis, Off opiod since May 2017   Assessment & Plan:  Stable, continue present management     Orders:  -     fentaNYL; Place 1 patch onto the skin every third day. Dispense: 10 patch; Refill: 0  -     fentaNYL; Place 1 patch onto the skin every third day. Dispense: 10 patch; Refill: 0  -     fentaNYL; Place 1 patch onto the skin every third day. Dispense: 10 patch; Refill: 0  -     HYDROcodone-Acetaminophen; Take 1 tablet by mouth every 12 (twelve) hours as needed for Pain. Dispense: 60 tablet; Refill: 0  -     HYDROcodone-Acetaminophen; Take 1 tablet by mouth every 12 (twelve) hours as needed for Pain. Dispense: 60 tablet; Refill: 0  -     HYDROcodone-Acetaminophen; Take 1 tablet by mouth every 8 (eight) hours as needed for Pain. Dispense: 90 tablet; Refill: 0  4. Chronic midline low back pain without sciatica  Assessment & Plan:  Stable, continue present management     Orders:  -     fentaNYL; Place 1 patch onto the skin every third day. Dispense: 10 patch; Refill: 0  -     fentaNYL; Place 1 patch onto the skin every third day. Dispense: 10 patch; Refill: 0  -     fentaNYL; Place 1 patch onto the skin every third day. Dispense: 10 patch; Refill: 0  -     HYDROcodone-Acetaminophen; Take 1 tablet by mouth every 12 (twelve) hours as needed for Pain. Dispense: 60 tablet; Refill: 0  -     HYDROcodone-Acetaminophen; Take 1 tablet by mouth every 12 (twelve) hours as needed for Pain. Dispense: 60 tablet; Refill: 0  -     HYDROcodone-Acetaminophen; Take 1 tablet by mouth every 8 (eight) hours as needed for Pain. Dispense: 90 tablet; Refill: 0  5. Chronic lymphocytic thyroiditis  Overview:  Dx at [facility] in about 2005  Assessment & Plan:  Stable, continue present management     6. Acquired hypothyroidism  Overview:  100 levothyroxine 4/11/2016  Assessment & Plan:  Thyroid shows Good control. TSH: 0.719, done on 7/15/2020. FT4: 1.3, done on 7/15/2020. FT3: 2.58, done on 7/15/2020.    Thyroid therapy includes levothyroxine 100 MCG Oral Tab [[id number]]. 7. Simple chronic bronchitis ([address]  Overview:  flare in 2020 tx with Breo    Assessment & Plan:  Stable, continue present management     8. Hypogammaglobulinemia, acquired [facility])  Overview:  Lab test 2016  Assessment & Plan:  Stable, continue present management     9. Periodic limb movement disorder (PLMD)  Overview:  mirapex 0.125 started 5/1/2019  Assessment & Plan:  Stable, continue present management     10. Hypersomnia  Overview:  adderall 20 BID   Orders:  -     Amphetamine-Dextroamphetamine; Take 1 tablet (20 mg total) by mouth 2 (two) times daily. Dispense: 60 tablet; Refill: 0  -     Amphetamine-Dextroamphetamine; Take 1 tablet (20 mg total) by mouth 2 (two) times daily. Dispense: 60 tablet; Refill: 0  -     Amphetamine-Dextroamphetamine; Take 1 tablet (20 mg total) by mouth 2 (two) times daily. Dispense: 60 tablet; Refill: 0  Other orders  -     buPROPion HCl ER (XL); Take 1 tablet (300 mg total) by mouth daily. Dispense: 90 tablet; Refill: 3  Chronic pain with lots of different complications. We will continue the fentanyl and Norco for now. She is in more pain due to the back so we will go to [address] [place] daily but she would be a good candidate for a switch over once the rules change for the buprenorphine to switch to this sublingually multiple times a day and she is willing to do this. We will work on a protocol before we switch over but I will be a good option down the road      I am having David Little maintain her venlafaxine ER, Sunosi, ondansetron, atorvastatin, amphetamine-dextroamphetamine, amphetamine-dextroamphetamine, fentaNYL, HYDROcodone-acetaminophen, fentaNYL, levothyroxine, HYDROcodone-acetaminophen, amphetamine-dextroamphetamine, HYDROcodone-acetaminophen, traZODone, fentaNYL, and buPROPion HCl ER (XL).    25 minutes were spent directly on patient care and another 3 minutes on precharting with her MyChart communication that happened earlier today and then another 3 minutes of documentation after the visit on the day of service for total of 31 minutes  Return in about 3 months (around 7/14/2023) for recheck.     Seven Aviles MD, 4/14/2023, 3:16 PM

## 2023-04-14 NOTE — ASSESSMENT & PLAN NOTE
Thyroid shows Good control. TSH: 0.719, done on 7/15/2020. FT4: 1.3, done on 7/15/2020. FT3: 2.58, done on 7/15/2020. Thyroid therapy includes levothyroxine 100 MCG Oral Tab [[id number]].

## 2023-04-25 DIAGNOSIS — F33.0 MILD RECURRENT MAJOR DEPRESSION (HCC): Chronic | ICD-10-CM

## 2023-05-01 RX ORDER — VENLAFAXINE HYDROCHLORIDE 150 MG/1
CAPSULE, EXTENDED RELEASE ORAL
Qty: 180 CAPSULE | Refills: 3 | Status: SHIPPED | OUTPATIENT
Start: 2023-05-01

## 2023-05-16 DIAGNOSIS — R45.0 FEELING JITTERY: ICD-10-CM

## 2023-05-16 DIAGNOSIS — E78.2 MIXED HYPERLIPIDEMIA: Chronic | ICD-10-CM

## 2023-05-17 RX ORDER — ONDANSETRON 4 MG/1
TABLET, ORALLY DISINTEGRATING ORAL
Qty: 60 TABLET | Refills: 3 | Status: SHIPPED | OUTPATIENT
Start: 2023-05-17

## 2023-05-17 RX ORDER — ATORVASTATIN CALCIUM 10 MG/1
TABLET, FILM COATED ORAL
Qty: 90 TABLET | Refills: 3 | Status: SHIPPED | OUTPATIENT
Start: 2023-05-17

## 2023-06-07 ENCOUNTER — HOSPITAL ENCOUNTER (EMERGENCY)
Age: 58
Discharge: HOME | End: 2023-06-07
Attending: EMERGENCY MEDICINE
Payer: MEDICARE

## 2023-06-07 VITALS
HEART RATE: 68 BPM | SYSTOLIC BLOOD PRESSURE: 100 MMHG | HEIGHT: 63 IN | WEIGHT: 110 LBS | TEMPERATURE: 98 F | RESPIRATION RATE: 16 BRPM | BODY MASS INDEX: 19.49 KG/M2 | OXYGEN SATURATION: 97 % | DIASTOLIC BLOOD PRESSURE: 74 MMHG

## 2023-06-07 DIAGNOSIS — M62.830 BACK SPASM: Primary | ICD-10-CM

## 2023-06-07 PROCEDURE — 99284 EMERGENCY DEPT VISIT MOD MDM: CPT

## 2023-06-07 PROCEDURE — 96372 THER/PROPH/DIAG INJ SC/IM: CPT

## 2023-06-07 RX ORDER — HYDROMORPHONE HYDROCHLORIDE 1 MG/ML
0.5 INJECTION, SOLUTION INTRAMUSCULAR; INTRAVENOUS; SUBCUTANEOUS ONCE
Status: COMPLETED | OUTPATIENT
Start: 2023-06-07 | End: 2023-06-07

## 2023-06-07 RX ORDER — DIAZEPAM 5 MG/1
5 TABLET ORAL 3 TIMES DAILY PRN
Qty: 20 TABLET | Refills: 0 | Status: SHIPPED | OUTPATIENT
Start: 2023-06-07 | End: 2023-06-14

## 2023-06-07 RX ORDER — HYDROMORPHONE HYDROCHLORIDE 1 MG/ML
INJECTION, SOLUTION INTRAMUSCULAR; INTRAVENOUS; SUBCUTANEOUS
Status: COMPLETED
Start: 2023-06-07 | End: 2023-06-07

## 2023-06-07 RX ORDER — KETOROLAC TROMETHAMINE 30 MG/ML
30 INJECTION, SOLUTION INTRAMUSCULAR; INTRAVENOUS ONCE
Status: COMPLETED | OUTPATIENT
Start: 2023-06-07 | End: 2023-06-07

## 2023-06-07 RX ORDER — DIAZEPAM 5 MG/1
5 TABLET ORAL ONCE
Status: COMPLETED | OUTPATIENT
Start: 2023-06-07 | End: 2023-06-07

## 2023-06-07 RX ORDER — NAPROXEN 500 MG/1
500 TABLET ORAL 2 TIMES DAILY PRN
Qty: 20 TABLET | Refills: 0 | Status: SHIPPED | OUTPATIENT
Start: 2023-06-07 | End: 2023-06-14

## 2023-06-07 NOTE — ED INITIAL ASSESSMENT (HPI)
PT to the ED for evaluation of lower right sided back pain for the last couple of days. Reports she \"threw her back out. \" Denies loss of bowel or bladder control. PT last took norco 6 hours ago. PT also currently has a fentanyl patch on for another issue.

## 2023-06-07 NOTE — ED QUICK NOTES
Pt able to reposition herself in the bed with ease.  Rolling to her R side and curling her legs up to her chest.

## 2023-06-18 ENCOUNTER — PATIENT MESSAGE (OUTPATIENT)
Dept: SURGERY | Facility: CLINIC | Age: 58
End: 2023-06-18

## 2023-06-19 NOTE — TELEPHONE ENCOUNTER
Agree with nursing, follow up needed.  Pain management and medication therapy will need to be managed by pain services, mychart message sent

## 2023-07-09 NOTE — ASSESSMENT & PLAN NOTE
Cholesterol shows Good control. Stable, continue present management   Cholesterol: 155, done on 7/15/2020. HDL Cholesterol: 76, done on 7/15/2020. TriGlycerides 93, done on 7/15/2020. LDL Cholesterol: 60, done on 7/15/2020. Cholesterol medications include ATORVASTATIN 10 MG Oral Tab [[id number]].

## 2023-07-09 NOTE — ASSESSMENT & PLAN NOTE
Thyroid shows Good control. TSH: 0.719, done on 7/15/2020. FT4: 1.3, done on 7/15/2020. FT3: 2.58, done on 7/15/2020. Thyroid therapy includes levothyroxine 100 MCG Oral Tab [[id number]].

## 2023-07-09 NOTE — ASSESSMENT & PLAN NOTE
Clinical Course: stable   Good control  Antidepressant Meds: buPROPion ER Tb24 - 300 MG; buPROPion HCl ER (XL) Tb24 - 450 MG; traZODone Tabs - 100 MG; venlafaxine ER Cp24 - 150 MG

## 2023-07-10 ENCOUNTER — TELEMEDICINE (OUTPATIENT)
Dept: FAMILY MEDICINE CLINIC | Facility: CLINIC | Age: 58
End: 2023-07-10

## 2023-07-10 ENCOUNTER — TELEPHONE (OUTPATIENT)
Dept: FAMILY MEDICINE CLINIC | Facility: CLINIC | Age: 58
End: 2023-07-10

## 2023-07-10 DIAGNOSIS — E06.3 CHRONIC LYMPHOCYTIC THYROIDITIS: ICD-10-CM

## 2023-07-10 DIAGNOSIS — M54.50 CHRONIC MIDLINE LOW BACK PAIN WITHOUT SCIATICA: ICD-10-CM

## 2023-07-10 DIAGNOSIS — J41.0 SIMPLE CHRONIC BRONCHITIS (HCC): ICD-10-CM

## 2023-07-10 DIAGNOSIS — F33.0 MILD RECURRENT MAJOR DEPRESSION (HCC): ICD-10-CM

## 2023-07-10 DIAGNOSIS — E03.9 ACQUIRED HYPOTHYROIDISM: ICD-10-CM

## 2023-07-10 DIAGNOSIS — G89.4 CHRONIC PAIN SYNDROME: ICD-10-CM

## 2023-07-10 DIAGNOSIS — G47.10 HYPERSOMNIA: Chronic | ICD-10-CM

## 2023-07-10 DIAGNOSIS — G89.29 CHRONIC MIDLINE LOW BACK PAIN WITHOUT SCIATICA: ICD-10-CM

## 2023-07-10 DIAGNOSIS — E78.2 MIXED HYPERLIPIDEMIA: Primary | ICD-10-CM

## 2023-07-10 RX ORDER — DEXTROAMPHETAMINE SACCHARATE, AMPHETAMINE ASPARTATE, DEXTROAMPHETAMINE SULFATE AND AMPHETAMINE SULFATE 5; 5; 5; 5 MG/1; MG/1; MG/1; MG/1
20 TABLET ORAL 2 TIMES DAILY
Qty: 60 TABLET | Refills: 0 | Status: SHIPPED | OUTPATIENT
Start: 2023-09-08 | End: 2023-10-08

## 2023-07-10 RX ORDER — FENTANYL 25 UG/1
1 PATCH TRANSDERMAL
Qty: 10 PATCH | Refills: 0 | Status: SHIPPED | OUTPATIENT
Start: 2023-07-10 | End: 2023-08-09

## 2023-07-10 RX ORDER — DEXTROAMPHETAMINE SACCHARATE, AMPHETAMINE ASPARTATE, DEXTROAMPHETAMINE SULFATE AND AMPHETAMINE SULFATE 5; 5; 5; 5 MG/1; MG/1; MG/1; MG/1
20 TABLET ORAL 2 TIMES DAILY
Qty: 60 TABLET | Refills: 0 | Status: SHIPPED | OUTPATIENT
Start: 2023-07-10 | End: 2023-08-09

## 2023-07-10 RX ORDER — FENTANYL 25 UG/1
1 PATCH TRANSDERMAL
Qty: 10 PATCH | Refills: 0 | Status: SHIPPED | OUTPATIENT
Start: 2023-08-09 | End: 2023-09-08

## 2023-07-10 RX ORDER — FENTANYL 25 UG/1
1 PATCH TRANSDERMAL
Qty: 10 PATCH | Refills: 0 | Status: SHIPPED | OUTPATIENT
Start: 2023-07-10 | End: 2023-07-10

## 2023-07-10 RX ORDER — HYDROCODONE BITARTRATE AND ACETAMINOPHEN 10; 325 MG/1; MG/1
1 TABLET ORAL EVERY 8 HOURS PRN
Qty: 60 TABLET | Refills: 0 | Status: SHIPPED | OUTPATIENT
Start: 2023-07-10 | End: 2023-08-09

## 2023-07-10 RX ORDER — FENTANYL 25 UG/1
1 PATCH TRANSDERMAL
Qty: 10 PATCH | Refills: 0 | Status: SHIPPED | OUTPATIENT
Start: 2023-09-08 | End: 2023-10-08

## 2023-07-10 RX ORDER — TIZANIDINE 4 MG/1
4 TABLET ORAL EVERY 6 HOURS PRN
Qty: 90 TABLET | Refills: 3 | Status: SHIPPED | OUTPATIENT
Start: 2023-07-10

## 2023-07-10 RX ORDER — HYDROCODONE BITARTRATE AND ACETAMINOPHEN 10; 325 MG/1; MG/1
1 TABLET ORAL EVERY 12 HOURS PRN
Qty: 60 TABLET | Refills: 0 | Status: SHIPPED | OUTPATIENT
Start: 2023-08-09 | End: 2023-09-08

## 2023-07-10 RX ORDER — HYDROCODONE BITARTRATE AND ACETAMINOPHEN 10; 325 MG/1; MG/1
1 TABLET ORAL EVERY 12 HOURS PRN
Qty: 60 TABLET | Refills: 0 | Status: SHIPPED | OUTPATIENT
Start: 2023-09-08 | End: 2023-10-08

## 2023-07-10 RX ORDER — DEXTROAMPHETAMINE SACCHARATE, AMPHETAMINE ASPARTATE, DEXTROAMPHETAMINE SULFATE AND AMPHETAMINE SULFATE 5; 5; 5; 5 MG/1; MG/1; MG/1; MG/1
20 TABLET ORAL 2 TIMES DAILY
Qty: 60 TABLET | Refills: 0 | Status: SHIPPED | OUTPATIENT
Start: 2023-08-09 | End: 2023-09-08

## 2023-07-10 NOTE — TELEPHONE ENCOUNTER
Pharmacist states that they cannot accept a verbal for an early refill. Dr Geovany Lew needs to cancel today's Rx and send a new Rx with a note authorizing early refill. Dr Geovany Lew aware.

## 2023-07-10 NOTE — TELEPHONE ENCOUNTER
Pt had a telehealth visit today. She states that she needs early refill of Fntanyl patch because one patch got wet and she was unable to use it. Will you authorize early refill? Routed to Dr Jt Cho.

## 2023-07-10 NOTE — TELEPHONE ENCOUNTER
Pt call requesting early refill on this medication              fentaNYL 25 MCG/HR Transdermal Patch 72 Hr 10 patch 0 9/8/2023 10/8/2023   Sig:   Place 1 patch onto the skin every third day.              [facility] [place] [address], [address] [place] [address] AT [phone] [id number]  [facility], [phone], [address] [facility] [place] [id number] Phone: [phone] Fax: [phone] Hours: Not open 24 hours

## 2023-07-11 ENCOUNTER — OFFICE VISIT (OUTPATIENT)
Dept: SURGERY | Facility: CLINIC | Age: 58
End: 2023-07-11
Payer: MEDICARE

## 2023-07-11 VITALS
SYSTOLIC BLOOD PRESSURE: 110 MMHG | WEIGHT: 108 LBS | BODY MASS INDEX: 19.14 KG/M2 | HEIGHT: 63 IN | DIASTOLIC BLOOD PRESSURE: 70 MMHG | HEART RATE: 70 BPM

## 2023-07-11 DIAGNOSIS — M51.36 DDD (DEGENERATIVE DISC DISEASE), LUMBAR: ICD-10-CM

## 2023-07-11 DIAGNOSIS — G89.4 CHRONIC PAIN SYNDROME: ICD-10-CM

## 2023-07-11 DIAGNOSIS — G04.81 AUTOIMMUNE ENCEPHALITIS: ICD-10-CM

## 2023-07-11 DIAGNOSIS — M54.50 LUMBAR PAIN: Primary | ICD-10-CM

## 2023-07-11 DIAGNOSIS — M54.16 RIGHT LUMBAR RADICULOPATHY: ICD-10-CM

## 2023-07-11 NOTE — PROGRESS NOTES
Established patient:  Reason for follow up: follow up, lower back pain    Numeric Rating Scale: (No Pain) 0  to  10 (Worst Pain)        Pain at Present:  8/10       Distribution of Pain:    bilateral    Most recent treatments for Current Pain Condition:   Other injections states she had 2 injections   Response to treatment: no relief    New imaging or testing since your last office visit:      None

## 2023-07-11 NOTE — PROGRESS NOTES
Patient: Whit Woods  Medical Record Number: [id number]  YOB: 1965  PCP: Emy Lim MD    Reason for visit: Lumbar follow up    HISTORY OF CHIEF COMPLAINT:    Whit Woods is a very pleasant 59year old female who presents today for: Lumbar follow up  History of limbic encephalitis and chronic pain syndrome. Reports that she is 4 autoimmune conditions. She is in constant pain. Her back pain is significantly progressed. Her goal is to be able to get out of bed for daily activities by September. Her son is in [place] and is coming home to visit. He does not live in the [place]. She also has pain that radiates down the right back of the leg. Her main complaint is the low back pain which affects her activities of daily living. She states she was told by a neurosurgeon approximately 10 years ago at [facility] that she would need back surgery in the future. Last hx: 7/7/21  Whit Woods is a very pleasant 60year old female, with a pertinent PMH of limbic encephalitis, chronic pain syndrome, periodic limb movement disorder, ADHD, depression, hyperlipidemia, Raynaud's syndrome, chronic bronchitis, hypothyroidism, Hashimoto's thyroiditis  Presents today for a complaint of: Patient presents with:  New Patient: lumbar pain     Onset: Symptoms began 12 years ago. States chronic pain syndrome. Location: Mid and bilateral back pain. No shooting leg pain, numbness, tingling or weakness. Duration/Timing: Symptoms have been occurring for 12 years. Pain is constant. She rates the pain a 10 out of 10 currently. She is seen Dr. Coty Mendoza in the past, she would like to become established with a new pain service group. No significant gait instability, she states vertigo though. No bladder/bowel incontinence/retention. No neck pain or shooting arm pain. No hand numbness, tingling or weakness. Not dropping items. No difficulty opening jars.   She has not tried any recent physical therapy or injections. She takes Norco and fentanyl patches. Her pain is managed by Dr. Latonia Hawley primary care physician    Past Medical History:   Diagnosis Date    Autoimmune disease ([address]     limbic encephalitis    Back problem     chronic pain due to encephalitits    Blood disorder     anemic    Depression     Disorder of thyroid     Encephalitis 2006    Endocrine disorder     History of blood transfusion     Plasmapheresis weekly. last tx was in 2017    Intrauterine device 1997    none now    Kidney infection 01/2017    Thyroid disease     Visual impairment     contact      Past Surgical History:   Procedure Laterality Date    COLONOSCOPY N/A 5/24/2016    Procedure: COLONOSCOPY;  Surgeon: Erika Arenas MD;  Location: [address] [place] [address] ENDOSCOPY    OTHER  2016    central line placement    OTHER SURGICAL HISTORY N/A 5/24/2016    Procedure: ESOPHAGOGASTRODUODENOSCOPY (EGD);   Surgeon: Erika Arenas MD;  Location: [address] [place] [address] ENDOSCOPY      Family History   Problem Relation Age of Onset    Cancer Father         prostate    Heart Disease Father     Heart Disorder Maternal Grandfather     Stroke Maternal Grandfather     Heart Disorder Maternal Grandmother     Cancer Mother         cervical    Cancer Paternal Grandfather     Cancer Paternal Grandmother     Thyroid Disorder Sister         hypo, x2      Social History    Socioeconomic History      Marital status:       Number of children: 1    Occupational History      Occupation: Retired     Tobacco Use      Smoking status: Never      Smokeless tobacco: Never    Vaping Use      Vaping Use: Never used    Substance and Sexual Activity      Alcohol use: No        Alcohol/week: 0.0 standard drinks of alcohol      Drug use: No    Other Topics      Concerns:        Caffeine Concern: Yes          1-2 cups daily        Exercise: No        Seat Belt: Yes        Self-Exams: No       Lidocaine               OTHER (SEE COMMENTS)    Comment:Backaches, TNS transient neurological syndrome,             others in this class are not an issue  Reglan [Metoclopram*    JITTERY   Current Medications:  Current Outpatient Medications   Medication Sig Dispense Refill    [START ON 9/8/2023] HYDROcodone-acetaminophen  MG Oral Tab Take 1 tablet by mouth every 12 (twelve) hours as needed for Pain. 60 tablet 0    [START ON 9/8/2023] amphetamine-dextroamphetamine (ADDERALL) 20 MG Oral Tab Take 1 tablet (20 mg total) by mouth 2 (two) times daily. 60 tablet 0    [START ON 8/9/2023] amphetamine-dextroamphetamine (ADDERALL) 20 MG Oral Tab Take 1 tablet (20 mg total) by mouth 2 (two) times daily. 60 tablet 0    amphetamine-dextroamphetamine (ADDERALL) 20 MG Oral Tab Take 1 tablet (20 mg total) by mouth 2 (two) times daily. 60 tablet 0    fentaNYL 25 MCG/HR Transdermal Patch 72 Hr Place 1 patch onto the skin every third day. 10 patch 0    ATORVASTATIN 10 MG Oral Tab TAKE 1 TABLET(10 MG) BY MOUTH DAILY 90 tablet 3    ONDANSETRON 4 MG Oral Tablet Dispersible DISSOLVE 1 TABLET(4 MG) ON THE TONGUE EVERY 8 HOURS AS NEEDED FOR NAUSEA 60 tablet 3    VENLAFAXINE  MG Oral Capsule SR 24 Hr TAKE 2 CAPSULES BY MOUTH EVERY  capsule 3    levothyroxine 100 MCG Oral Tab Take 1 tablet (100 mcg total) by mouth before breakfast. 90 tablet 3    traZODone 100 MG Oral Tab Take 1-1.5 tablets (100-150 mg total) by mouth nightly as needed for Sleep. 135 tablet 3    tiZANidine (ZANAFLEX) 4 MG Oral Tab Take 1 tablet (4 mg total) by mouth every 6 (six) hours as needed. (Patient not taking: Reported on 7/11/2023) 90 tablet 3        REVIEW OF SYSTEMS   Comprehensive review of systems done. Negative except what is outlined in the above HPI. PHYSICAL EXAMIMATION    height is 63\" and weight is 108 lb (49 kg). Her blood pressure is 110/70 and her pulse is 70. GENERAL: Very pleasant patient is in no apparent distress. Sitting comfortably in the wheelchair. HEENT: Normocephalic, atraumatic.   RESPIRATORY RATE: Easy and Even  SKIN: Warm and dry  NEURO: Awake, alert and orientated. Speech fluent, comprehension intact, answering questions appropriately. SPINE:  Gait/Coordination: Gait deferred. + tenderness to palpation throughout the low back  Upper Extremity Strength:     Deltoid  Biceps  Triceps    Intrinsics      Right 5 5 5 5 5     Left 5 5 5 5 5     Lower Extremity Strength:     Iliopsoas  Hamstrings   Quads    D-flexion P-flexion Great Toe   Right       5         5       5         5 5 5   Left       5         5       5         5 5 5     Tests:   Test Right   (POS or NEG) Left   (POS or NEG)   SLR Neg Neg   Hoffmans Neg Neg   Clonus Neg Neg     Reflexes DTRs:       Biceps    Brachioradialis    Triceps     Patellar     Ankle    Right       2+             2+      2+         2+        2+    Left       2+            2+        2+         2+        2+      DATA:   None    IMAGING:   Study Result    Narrative   PROCEDURE:  MRI SPINE LUMBAR (CPT=72148)     COMPARISON:  None. INDICATIONS:  G89.29 Chronic midline low back pain without sciatica M54.5 Chronic midline low back pain without sciatica     TECHNIQUE:  Multiplanar T1 and T2 weighted images including fat suppression sequences. Images acquired in sagittal and axial planes. PATIENT STATED HISTORY: (As transcribed by Technologist)  Patient has a history of LIMBIC ENCEPHALITIS and complains of having chronic lower back pain         FINDINGS:    LUMBAR DISC LEVELS  L1-L2:  No significant disc/facet abnormality, spinal stenosis, or foraminal narrowing. L2-L3:  No significant disc/facet abnormality, spinal stenosis, or foraminal narrowing. L3-L4:  Mild degenerative disc bulge/osteophyte complex. There is mild facet joint degenerative change bilaterally. There is mild bilateral neural foraminal narrowing.   L4-L5:  There is a moderate broad-based disc bulge with superimposed right posterior paracentral disc protrusion with mild mass effect on the descending right L4 nerve root. There is mild central canal stenosis of 8 mm. There is mild bilateral facet  joint degenerative change. There is mild to moderate right subarticular zone and neural foraminal narrowing. L5-S1:  No significant disc/facet abnormality, spinal stenosis, or foraminal narrowing. PARASPINAL AREA:  Normal with no visible mass. BONY STRUCTURES:  Mild to moderate scoliosis at the thoracolumbar junction. No fracture, pars defect, significant scoliosis, or osseous lesion. CORD/CAUDA EQUINA:  Normal caliber, contour, and signal intensity. Impression   CONCLUSION:       1. L4-5 moderate broad-based disc bulge with superimposed right posterior paracentral disc protrusion with mild mass effect on the right L4 descending nerve root and mild central canal stenosis. 2. There is mild degenerative changes at L3-4. Dictated by (CST): Martha Brumfield MD on 6/19/2021 at 3:01 PM      Finalized by (CST): Martha Brumfield MD on 6/19/2021 at 3:19 PM      MEDICAL DECISION MAKING:     ASSESSMENT and PLAN:  (M54.50) Lumbar pain  (primary encounter diagnosis)    (G89.4) Chronic pain syndrome    (G04.81) Autoimmune encephalitis    (M51.36) DDD (degenerative disc disease), lumbar    (M54.16) Right lumbar radiculopathy    PLAN:   1. Medication: None prescribed  2. Imaging:    - Reviewed today:    -MRI lumbar spine:     - Multilevel lumbar facet arthropathy, most prominent in the inferior aspect of the lumbar spine. Right L4-5 disc bulge possibly abutting the descending right L5 nerve root   - Ordered today:    -MRI and x-ray lumbar spine:     -Chronic low back pain with likely right lumbar radiculopathy  3. Referral:    -Physiatry   -Rheumatology  4.  Follow up with Dr. Timothy Tompkins or Dr. Josephine Piper for imaging review or call or follow up sooner or go to the ED for any new, worsening or concerning signs or symptoms    -This is a pleasant 57-year-old female with a pertinent history of limbic encephalitis and chronic pain syndrome. She reports a total of 4 autoimmune conditions. Her main complaint is low back pain with right radiating posterior leg pain. She has not had recent therapy or injections since 2021. She was told by [facility], approximately 10 years prior, that she would need surgical intervention on the lumbar spine. She is very eager to proceed with treatment and to see if she is a surgical candidate. Her son is in [place] and is coming to visit this fall. Her goal is to be able to get out of bed for daily activities and to spend time with him. She would like to proceed with MRI imaging and establishing care with a neurosurgeon. MRI and x-ray ordered. Referral to physiatry provided. Referral to rheumatology given her chronic pain and autoimmune conditions, as she is not currently established with a rheumatologist     I reviewed imaging. I discussed the plan and reviewed imaging with the patient. The patient agrees with the plan, verbalized understanding and is appreciative. All questions were sought out and thoroughly answered to satisfaction. Total visit time: 30 minutes  More than 50% spent coordinating care, providing patient education, reviewing imaging, discussing further imaging, discussing physiatry, discussing rheumatology and counseling. Preston Elizabeth M.S., ONDINA WARD [address], [address] [place] [address] Vic Hooks, [address] [place] [address]  [phone]  7/11/2023 1:32 PM    Dragon speech recognition software was used to prepare this note. If a word or phrase is confusing, it is likely due to a failure of recognition. Please contact me with any questions or clarifications.

## 2023-07-24 ENCOUNTER — TELEPHONE (OUTPATIENT)
Dept: SURGERY | Facility: CLINIC | Age: 58
End: 2023-07-24

## 2023-07-24 NOTE — TELEPHONE ENCOUNTER
Called PT LMTCB to schedule f/u appt with Farzana to discuss MRI if needed. Patient cancelled appt with Dr. Russell Rivas to discuss mri. When pt calls back please assist in r/s appt.

## 2023-07-25 ENCOUNTER — PATIENT MESSAGE (OUTPATIENT)
Dept: CASE MANAGEMENT | Age: 58
End: 2023-07-25

## 2023-07-26 ENCOUNTER — HOSPITAL ENCOUNTER (OUTPATIENT)
Dept: MRI IMAGING | Facility: HOSPITAL | Age: 58
Discharge: HOME | End: 2023-07-26
Attending: PHYSICIAN ASSISTANT
Payer: MEDICARE

## 2023-07-26 DIAGNOSIS — M54.16 RIGHT LUMBAR RADICULOPATHY: ICD-10-CM

## 2023-07-26 DIAGNOSIS — M54.50 LUMBAR PAIN: ICD-10-CM

## 2023-07-26 DIAGNOSIS — G04.81 AUTOIMMUNE ENCEPHALITIS: ICD-10-CM

## 2023-07-26 DIAGNOSIS — M51.36 DDD (DEGENERATIVE DISC DISEASE), LUMBAR: ICD-10-CM

## 2023-07-26 DIAGNOSIS — G89.4 CHRONIC PAIN SYNDROME: ICD-10-CM

## 2023-08-22 ENCOUNTER — HOSPITAL ENCOUNTER (OUTPATIENT)
Dept: MRI IMAGING | Facility: HOSPITAL | Age: 58
Discharge: HOME | End: 2023-08-22
Attending: PHYSICIAN ASSISTANT
Payer: MEDICARE

## 2023-08-22 PROCEDURE — 72148 MRI LUMBAR SPINE W/O DYE: CPT | Performed by: PHYSICIAN ASSISTANT

## 2023-08-25 ENCOUNTER — OFFICE VISIT (OUTPATIENT)
Dept: SURGERY | Facility: CLINIC | Age: 58
End: 2023-08-25
Payer: MEDICARE

## 2023-08-25 VITALS
DIASTOLIC BLOOD PRESSURE: 72 MMHG | BODY MASS INDEX: 19.14 KG/M2 | HEART RATE: 86 BPM | HEIGHT: 63 IN | WEIGHT: 108 LBS | SYSTOLIC BLOOD PRESSURE: 100 MMHG

## 2023-08-25 DIAGNOSIS — M54.16 RIGHT LUMBAR RADICULOPATHY: Primary | ICD-10-CM

## 2023-08-25 PROCEDURE — 3078F DIAST BP <80 MM HG: CPT | Performed by: NEUROLOGICAL SURGERY

## 2023-08-25 PROCEDURE — 3008F BODY MASS INDEX DOCD: CPT | Performed by: NEUROLOGICAL SURGERY

## 2023-08-25 PROCEDURE — 99213 OFFICE O/P EST LOW 20 MIN: CPT | Performed by: NEUROLOGICAL SURGERY

## 2023-08-25 PROCEDURE — 3074F SYST BP LT 130 MM HG: CPT | Performed by: NEUROLOGICAL SURGERY

## 2023-08-25 NOTE — PROGRESS NOTES
Established patient:  Reason for follow up:  Low back pain, right leg     Numeric Rating Scale:       Pain at Present:  6/10       New imaging or testing since your last office visit:    MRI spine lumbar 08/22/23

## 2023-08-25 NOTE — PROGRESS NOTES
[facility]  Neurological Surgery Clinic Note    Helen Bustamante  10/5/1965  [id number]  PCP: Seven Aviles MD    REASON FOR VISIT:  R leg radiculopathy    HISTORY OF PRESENT ILLNESS:  Helen Bustamante is a(n) 59year old female with progressive but chronic R leg radiculopathy. Patient has attempted PT and KATTY with little relief. She has weakness in her R leg. She states she is on a Fentanyl patch and Norco for the pain. The pain radiates to her R anterior thigh. She denies any bowel/bladder habit changes. Location: RLE radiculopathy  Quality: numbness/tingling  Severity: progressi e  Duration: years but recent progression  Timing: any  Context: MRI shows herniated disc at L4-5 on R  Modifying Factors: PT and KATTY offer no relief   Associated signs/symptoms: pain      PAST MEDICAL HISTORY:  Past Medical History:   Diagnosis Date    Autoimmune disease ([facility] [address]     limbic encephalitis    Back problem     chronic pain due to encephalitits    Blood disorder     anemic    Depression     Disorder of thyroid     Encephalitis 2006    Endocrine disorder     History of blood transfusion     Plasmapheresis weekly. last tx was in 2017    Intrauterine device 1997    none now    Kidney infection 01/2017    Thyroid disease     Visual impairment     contact       PAST SURGICAL HISTORY:  Past Surgical History:   Procedure Laterality Date    COLONOSCOPY N/A 5/24/2016    Procedure: COLONOSCOPY;  Surgeon: Livan Lanier MD;  Location: [address] [place] [address] ENDOSCOPY    OTHER  2016    central line placement    OTHER SURGICAL HISTORY N/A 5/24/2016    Procedure: ESOPHAGOGASTRODUODENOSCOPY (EGD); Surgeon: Livan Lanier MD;  Location: [address] [place] [address] ENDOSCOPY       FAMILY HISTORY:  family history includes Cancer in her father, mother, paternal grandfather, and paternal grandmother; Heart Disease in her father; Heart Disorder in her maternal grandfather and maternal grandmother; Stroke in her maternal grandfather;  Thyroid Disorder in her sister. SOCIAL HISTORY:   reports that she has never smoked. She has never been exposed to tobacco smoke. She has never used smokeless tobacco. She reports that she does not drink alcohol and does not use drugs. ALLERGIES:    Lidocaine               OTHER (SEE COMMENTS)    Comment:Backaches, TNS transient neurological syndrome,             others in this class are not an issue  Reglan [Metoclopram*    JITTERY    MEDICATIONS:  [START ON 9/8/2023] HYDROcodone-acetaminophen  MG Oral Tab, Take 1 tablet by mouth every 12 (twelve) hours as needed for Pain., Disp: 60 tablet, Rfl: 0  [START ON 9/8/2023] amphetamine-dextroamphetamine (ADDERALL) 20 MG Oral Tab, Take 1 tablet (20 mg total) by mouth 2 (two) times daily. , Disp: 60 tablet, Rfl: 0  amphetamine-dextroamphetamine (ADDERALL) 20 MG Oral Tab, Take 1 tablet (20 mg total) by mouth 2 (two) times daily. , Disp: 60 tablet, Rfl: 0  ATORVASTATIN 10 MG Oral Tab, TAKE 1 TABLET(10 MG) BY MOUTH DAILY, Disp: 90 tablet, Rfl: 3  ONDANSETRON 4 MG Oral Tablet Dispersible, DISSOLVE 1 TABLET(4 MG) ON THE TONGUE EVERY 8 HOURS AS NEEDED FOR NAUSEA, Disp: 60 tablet, Rfl: 3  VENLAFAXINE  MG Oral Capsule SR 24 Hr, TAKE 2 CAPSULES BY MOUTH EVERY DAY, Disp: 180 capsule, Rfl: 3  levothyroxine 100 MCG Oral Tab, Take 1 tablet (100 mcg total) by mouth before breakfast., Disp: 90 tablet, Rfl: 3  traZODone 100 MG Oral Tab, Take 1-1.5 tablets (100-150 mg total) by mouth nightly as needed for Sleep., Disp: 135 tablet, Rfl: 3  [DISCONTINUED] amphetamine-dextroamphetamine (ADDERALL) 20 MG Oral Tab, Take 1 tablet (20 mg total) by mouth 2 (two) times daily. , Disp: 60 tablet, Rfl: 0  fentaNYL 25 MCG/HR Transdermal Patch 72 Hr, Place 1 patch onto the skin every third day., Disp: 10 patch, Rfl: 0    No current facility-administered medications on file prior to visit. REVIEW OF SYSTEMS:  Review of Systems   All other systems reviewed and are negative.        PHYSICAL EXAMINATION:  Neurologic Exam     Mental Status   Oriented to person, place, and time. Cranial Nerves   Cranial nerves II through XII intact.      Motor Exam   Muscle bulk: normal  Overall muscle tone: normal  Right arm tone: normal  Left arm tone: normal  Right arm pronator drift: absent  Left arm pronator drift: absent  Right leg tone: normal  Left leg tone: normal    Sensory Exam   Light touch normal.   Vibration normal.   Proprioception normal.   Pinprick normal.     Gait, Coordination, and Reflexes     Gait  Gait: normal    Reflexes   Right brachioradialis: 2+  Left brachioradialis: 2+  Right biceps: 2+  Left biceps: 2+  Right triceps: 2+  Left triceps: 2+  Right patellar: 2+  Left patellar: 2+  Right achilles: 2+  Left achilles: 2+  Right : 2+  Left : 2+       IMAGING:  As above    ASSESSMENT:  R L4-5 HNP    Plan:  Recommend R L4-5 [facility]    [address] DO  Neurological Surgery  [facility]

## 2023-08-30 ENCOUNTER — PATIENT MESSAGE (OUTPATIENT)
Dept: FAMILY MEDICINE CLINIC | Facility: CLINIC | Age: 58
End: 2023-08-30

## 2023-09-01 ENCOUNTER — TELEMEDICINE (OUTPATIENT)
Dept: FAMILY MEDICINE CLINIC | Facility: CLINIC | Age: 58
End: 2023-09-01
Payer: MEDICARE

## 2023-09-01 ENCOUNTER — TELEPHONE (OUTPATIENT)
Dept: FAMILY MEDICINE CLINIC | Facility: CLINIC | Age: 58
End: 2023-09-01

## 2023-09-01 DIAGNOSIS — F90.0 ADHD (ATTENTION DEFICIT HYPERACTIVITY DISORDER), INATTENTIVE TYPE: Chronic | ICD-10-CM

## 2023-09-01 DIAGNOSIS — M46.96 INFLAMMATORY SPONDYLOPATHY OF LUMBAR REGION (HCC): Primary | ICD-10-CM

## 2023-09-01 DIAGNOSIS — J41.0 SIMPLE CHRONIC BRONCHITIS (HCC): Chronic | ICD-10-CM

## 2023-09-01 DIAGNOSIS — G89.29 CHRONIC MIDLINE LOW BACK PAIN WITHOUT SCIATICA: ICD-10-CM

## 2023-09-01 DIAGNOSIS — G89.4 CHRONIC PAIN SYNDROME: ICD-10-CM

## 2023-09-01 DIAGNOSIS — M54.50 CHRONIC MIDLINE LOW BACK PAIN WITHOUT SCIATICA: ICD-10-CM

## 2023-09-01 RX ORDER — HYDROCODONE BITARTRATE AND ACETAMINOPHEN 10; 325 MG/1; MG/1
2 TABLET ORAL EVERY 6 HOURS PRN
Qty: 100 TABLET | Refills: 0 | Status: SHIPPED | OUTPATIENT
Start: 2023-09-01 | End: 2023-09-15

## 2023-09-05 ENCOUNTER — TELEPHONE (OUTPATIENT)
Dept: FAMILY MEDICINE CLINIC | Facility: CLINIC | Age: 58
End: 2023-09-05

## 2023-09-05 DIAGNOSIS — Z01.818 PRE-OP TESTING: Primary | ICD-10-CM

## 2023-09-11 RX ORDER — MAGNESIUM OXIDE 400 MG (241.3 MG MAGNESIUM) TABLET
1 TABLET NIGHTLY PRN
COMMUNITY

## 2023-09-11 RX ORDER — MAGNESIUM 200 MG
1 TABLET ORAL DAILY
COMMUNITY
End: 2023-10-20

## 2023-09-14 ENCOUNTER — EKG ENCOUNTER (OUTPATIENT)
Dept: LAB | Facility: HOSPITAL | Age: 58
End: 2023-09-14
Attending: FAMILY MEDICINE
Payer: MEDICARE

## 2023-09-14 ENCOUNTER — OFFICE VISIT (OUTPATIENT)
Dept: FAMILY MEDICINE CLINIC | Facility: CLINIC | Age: 58
End: 2023-09-14
Payer: MEDICARE

## 2023-09-14 VITALS — DIASTOLIC BLOOD PRESSURE: 80 MMHG | SYSTOLIC BLOOD PRESSURE: 102 MMHG | HEART RATE: 68 BPM

## 2023-09-14 DIAGNOSIS — G89.4 CHRONIC PAIN SYNDROME: ICD-10-CM

## 2023-09-14 DIAGNOSIS — Z13.29 SCREENING FOR THYROID DISORDER: ICD-10-CM

## 2023-09-14 DIAGNOSIS — M54.16 RIGHT LUMBAR RADICULOPATHY: Primary | ICD-10-CM

## 2023-09-14 DIAGNOSIS — M54.50 LUMBAR PAIN: ICD-10-CM

## 2023-09-14 DIAGNOSIS — J41.0 SIMPLE CHRONIC BRONCHITIS (HCC): Chronic | ICD-10-CM

## 2023-09-14 DIAGNOSIS — E61.1 IRON DEFICIENCY: ICD-10-CM

## 2023-09-14 DIAGNOSIS — F33.0 MILD RECURRENT MAJOR DEPRESSION (HCC): Chronic | ICD-10-CM

## 2023-09-14 DIAGNOSIS — E06.3 CHRONIC LYMPHOCYTIC THYROIDITIS: Chronic | ICD-10-CM

## 2023-09-14 DIAGNOSIS — M51.36 DDD (DEGENERATIVE DISC DISEASE), LUMBAR: ICD-10-CM

## 2023-09-14 DIAGNOSIS — E78.2 MIXED HYPERLIPIDEMIA: Chronic | ICD-10-CM

## 2023-09-14 DIAGNOSIS — E55.9 VITAMIN D DEFICIENCY: ICD-10-CM

## 2023-09-14 DIAGNOSIS — M54.16 RIGHT LUMBAR RADICULOPATHY: ICD-10-CM

## 2023-09-14 DIAGNOSIS — M48.061 LUMBAR FORAMINAL STENOSIS: ICD-10-CM

## 2023-09-14 DIAGNOSIS — Z13.6 SCREENING FOR CARDIOVASCULAR CONDITION: ICD-10-CM

## 2023-09-14 DIAGNOSIS — Z12.31 VISIT FOR SCREENING MAMMOGRAM: ICD-10-CM

## 2023-09-14 DIAGNOSIS — M54.50 CHRONIC MIDLINE LOW BACK PAIN WITHOUT SCIATICA: ICD-10-CM

## 2023-09-14 DIAGNOSIS — G89.29 CHRONIC MIDLINE LOW BACK PAIN WITHOUT SCIATICA: ICD-10-CM

## 2023-09-14 DIAGNOSIS — Z01.818 PREOPERATIVE CLEARANCE: ICD-10-CM

## 2023-09-14 DIAGNOSIS — F90.0 ADHD (ATTENTION DEFICIT HYPERACTIVITY DISORDER), INATTENTIVE TYPE: Chronic | ICD-10-CM

## 2023-09-14 LAB
ANION GAP SERPL CALC-SCNC: 7 MMOL/L (ref 0–18)
ANTIBODY SCREEN: NEGATIVE
APTT PPP: 30.5 SECONDS (ref 23.3–35.6)
ATRIAL RATE: 70 BPM
BASOPHILS # BLD AUTO: 0.05 X10(3) UL (ref 0–0.2)
BASOPHILS NFR BLD AUTO: 1.4 %
BILIRUB UR QL STRIP.AUTO: NEGATIVE
BUN BLD-MCNC: 8 MG/DL (ref 7–18)
CALCIUM BLD-MCNC: 8.7 MG/DL (ref 8.5–10.1)
CHLORIDE SERPL-SCNC: 100 MMOL/L (ref 98–112)
CHOLEST SERPL-MCNC: 161 MG/DL (ref ?–200)
CLARITY UR REFRACT.AUTO: CLEAR
CO2 SERPL-SCNC: 31 MMOL/L (ref 21–32)
CREAT BLD-MCNC: 1.04 MG/DL
DEPRECATED HBV CORE AB SER IA-ACNC: 46.7 NG/ML
EGFRCR SERPLBLD CKD-EPI 2021: 62 ML/MIN/1.73M2 (ref 60–?)
EOSINOPHIL # BLD AUTO: 0.11 X10(3) UL (ref 0–0.7)
EOSINOPHIL NFR BLD AUTO: 3.1 %
ERYTHROCYTE [DISTWIDTH] IN BLOOD BY AUTOMATED COUNT: 12 %
FASTING PATIENT LIPID ANSWER: YES
GLUCOSE BLD-MCNC: 92 MG/DL (ref 70–99)
GLUCOSE UR STRIP.AUTO-MCNC: NORMAL MG/DL
HCT VFR BLD AUTO: 40.7 %
HDLC SERPL-MCNC: 72 MG/DL (ref 40–59)
HGB BLD-MCNC: 13.1 G/DL
IMM GRANULOCYTES # BLD AUTO: 0.01 X10(3) UL (ref 0–1)
IMM GRANULOCYTES NFR BLD: 0.3 %
INR BLD: 1.08 (ref 0.85–1.16)
IRON SATN MFR SERPL: 26 %
IRON SERPL-MCNC: 100 UG/DL
KETONES UR STRIP.AUTO-MCNC: NEGATIVE MG/DL
LDLC SERPL CALC-MCNC: 68 MG/DL (ref ?–100)
LEUKOCYTE ESTERASE UR QL STRIP.AUTO: NEGATIVE
LYMPHOCYTES # BLD AUTO: 0.87 X10(3) UL (ref 1–4)
LYMPHOCYTES NFR BLD AUTO: 24.2 %
MCH RBC QN AUTO: 30.1 PG (ref 26–34)
MCHC RBC AUTO-ENTMCNC: 32.2 G/DL (ref 31–37)
MCV RBC AUTO: 93.6 FL
MONOCYTES # BLD AUTO: 0.29 X10(3) UL (ref 0.1–1)
MONOCYTES NFR BLD AUTO: 8.1 %
NEUTROPHILS # BLD AUTO: 2.27 X10 (3) UL (ref 1.5–7.7)
NEUTROPHILS # BLD AUTO: 2.27 X10(3) UL (ref 1.5–7.7)
NEUTROPHILS NFR BLD AUTO: 62.9 %
NITRITE UR QL STRIP.AUTO: NEGATIVE
NONHDLC SERPL-MCNC: 89 MG/DL (ref ?–130)
OSMOLALITY SERPL CALC.SUM OF ELEC: 284 MOSM/KG (ref 275–295)
P AXIS: 60 DEGREES
P-R INTERVAL: 140 MS
PH UR STRIP.AUTO: 5.5 [PH] (ref 5–8)
PLATELET # BLD AUTO: 195 10(3)UL (ref 150–450)
POTASSIUM SERPL-SCNC: 3.6 MMOL/L (ref 3.5–5.1)
PROT UR STRIP.AUTO-MCNC: NEGATIVE MG/DL
PROTHROMBIN TIME: 14 SECONDS (ref 11.6–14.8)
Q-T INTERVAL: 462 MS
QRS DURATION: 76 MS
QTC CALCULATION (BEZET): 498 MS
R AXIS: 41 DEGREES
RBC # BLD AUTO: 4.35 X10(6)UL
RBC UR QL AUTO: NEGATIVE
RH BLOOD TYPE: POSITIVE
SODIUM SERPL-SCNC: 138 MMOL/L (ref 136–145)
SP GR UR STRIP.AUTO: 1.01 (ref 1–1.03)
T AXIS: 47 DEGREES
TIBC SERPL-MCNC: 386 UG/DL (ref 240–450)
TRANSFERRIN SERPL-MCNC: 259 MG/DL (ref 200–360)
TRIGL SERPL-MCNC: 122 MG/DL (ref 30–149)
TSI SER-ACNC: 1.34 MIU/ML (ref 0.36–3.74)
UROBILINOGEN UR STRIP.AUTO-MCNC: NORMAL MG/DL
VENTRICULAR RATE: 70 BPM
VIT D+METAB SERPL-MCNC: 22.9 NG/ML (ref 30–100)
VLDLC SERPL CALC-MCNC: 18 MG/DL (ref 0–30)
WBC # BLD AUTO: 3.6 X10(3) UL (ref 4–11)

## 2023-09-14 PROCEDURE — 82728 ASSAY OF FERRITIN: CPT

## 2023-09-14 PROCEDURE — 3008F BODY MASS INDEX DOCD: CPT | Performed by: FAMILY MEDICINE

## 2023-09-14 PROCEDURE — 82306 VITAMIN D 25 HYDROXY: CPT

## 2023-09-14 PROCEDURE — 93005 ELECTROCARDIOGRAM TRACING: CPT

## 2023-09-14 PROCEDURE — 80048 BASIC METABOLIC PNL TOTAL CA: CPT

## 2023-09-14 PROCEDURE — 85025 COMPLETE CBC W/AUTO DIFF WBC: CPT

## 2023-09-14 PROCEDURE — 84443 ASSAY THYROID STIM HORMONE: CPT

## 2023-09-14 PROCEDURE — 93010 ELECTROCARDIOGRAM REPORT: CPT | Performed by: INTERNAL MEDICINE

## 2023-09-14 PROCEDURE — 3074F SYST BP LT 130 MM HG: CPT | Performed by: FAMILY MEDICINE

## 2023-09-14 PROCEDURE — 83540 ASSAY OF IRON: CPT

## 2023-09-14 PROCEDURE — 87081 CULTURE SCREEN ONLY: CPT

## 2023-09-14 PROCEDURE — 99215 OFFICE O/P EST HI 40 MIN: CPT | Performed by: FAMILY MEDICINE

## 2023-09-14 PROCEDURE — 83550 IRON BINDING TEST: CPT

## 2023-09-14 PROCEDURE — 85610 PROTHROMBIN TIME: CPT

## 2023-09-14 PROCEDURE — 86850 RBC ANTIBODY SCREEN: CPT

## 2023-09-14 PROCEDURE — 81003 URINALYSIS AUTO W/O SCOPE: CPT

## 2023-09-14 PROCEDURE — 85730 THROMBOPLASTIN TIME PARTIAL: CPT

## 2023-09-14 PROCEDURE — 3079F DIAST BP 80-89 MM HG: CPT | Performed by: FAMILY MEDICINE

## 2023-09-14 PROCEDURE — 80061 LIPID PANEL: CPT

## 2023-09-14 PROCEDURE — 86901 BLOOD TYPING SEROLOGIC RH(D): CPT

## 2023-09-14 PROCEDURE — 36415 COLL VENOUS BLD VENIPUNCTURE: CPT

## 2023-09-14 PROCEDURE — 86900 BLOOD TYPING SEROLOGIC ABO: CPT

## 2023-09-14 RX ORDER — FENTANYL 25 UG/1
1 PATCH TRANSDERMAL
Qty: 10 PATCH | Refills: 0 | Status: SHIPPED | OUTPATIENT
Start: 2023-09-14 | End: 2023-10-14

## 2023-09-14 RX ORDER — HYDROCODONE BITARTRATE AND ACETAMINOPHEN 10; 325 MG/1; MG/1
2 TABLET ORAL EVERY 6 HOURS PRN
Qty: 100 TABLET | Refills: 0 | Status: SHIPPED | OUTPATIENT
Start: 2023-09-14 | End: 2023-09-28

## 2023-09-25 ENCOUNTER — TELEPHONE (OUTPATIENT)
Dept: FAMILY MEDICINE CLINIC | Facility: CLINIC | Age: 58
End: 2023-09-25

## 2023-09-25 NOTE — TELEPHONE ENCOUNTER
Patient is asking for her HYDROcodone-acetaminophen  MG Oral Tab to be changed to the 7.5mg  because they are in stock at her current pharmacy

## 2023-09-26 ENCOUNTER — TELEPHONE (OUTPATIENT)
Dept: SURGERY | Facility: CLINIC | Age: 58
End: 2023-09-26

## 2023-09-26 NOTE — TELEPHONE ENCOUNTER
Is she willing to switch 1:1 ration for 7.5 instead of 10? Otherwise I need a video visit.  Thanks and stay well, Kushal Hill MD

## 2023-09-26 NOTE — TELEPHONE ENCOUNTER
Call pt to schedule a video appt. Patient refuse because son is coming from [place] this coming Thursday.

## 2023-09-26 NOTE — TELEPHONE ENCOUNTER
Spoke with [facility] 10/325 on backorder and not sure when getting in. They do have Norco 7.5/325.   Please advise

## 2023-10-04 ENCOUNTER — TELEPHONE (OUTPATIENT)
Dept: SURGERY | Facility: CLINIC | Age: 58
End: 2023-10-04

## 2023-10-04 ENCOUNTER — TELEMEDICINE (OUTPATIENT)
Dept: SURGERY | Facility: CLINIC | Age: 58
End: 2023-10-04
Payer: MEDICARE

## 2023-10-04 DIAGNOSIS — M54.16 RIGHT LUMBAR RADICULOPATHY: Primary | ICD-10-CM

## 2023-10-04 PROCEDURE — 99215 OFFICE O/P EST HI 40 MIN: CPT | Performed by: NEUROLOGICAL SURGERY

## 2023-10-04 NOTE — TELEPHONE ENCOUNTER
Pt would like to switch her apt today to a visit instead of an office visit. Best contact [phone]

## 2023-10-04 NOTE — H&P (VIEW-ONLY)
[facility]  Neurological Surgery Clinic Note    Marshal Beltran  10/5/1965  [id number]  PCP: Makayla Gould MD    REASON FOR VISIT:  RLE radiculopathy    HISTORY OF PRESENT ILLNESS:  Marshal Beltran is a(n) 59year old female with RLE radiculopathy. 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 Discussed plan for R L4-5 NEHAL. Patient with scoliosis involving the thoracolumbar spine. She has no new symptoms. She has no weakness. She has CPS (chronic pain syndrome) and is on Norco 10/325 and Fentanyl patches chronically. Denies any bowel/bladder habit changes. 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 last tx was in 2017    Hx of motion sickness     In the car    Intrauterine device 1997    none now    Kidney infection 01/2017    Thyroid disease     Visual impairment     contacts       PAST SURGICAL HISTORY:  Past Surgical History:   Procedure Laterality Date    COLONOSCOPY N/A 5/24/2016    Procedure: COLONOSCOPY;  Surgeon: Wendy Doll MD;  Location: [address] [place] [address] ENDOSCOPY    OTHER  2016    central line placement    OTHER SURGICAL HISTORY N/A 5/24/2016    Procedure: ESOPHAGOGASTRODUODENOSCOPY (EGD);   Surgeon: Wendy Doll MD;  Location: [address] [place] [address] ENDOSCOPY       FAMILY HISTORY:  family history includes Cancer in her father, mother, paternal grandfather, and paternal grandmother; Heart Disease in her father; Heart Disorder in her maternal grandfather and maternal grandmother; Stroke in her maternal grandfather; Thyroid Disorder in her sister. SOCIAL HISTORY:   reports that she has never smoked. She has never been exposed to tobacco smoke. She has never used smokeless tobacco. She reports that she does not drink alcohol and does not use drugs. ALLERGIES:    Lidocaine               OTHER (SEE COMMENTS)    Comment:Backaches, TNS transient neurological syndrome,             others in this class are not an issue  Blueberry               NAUSEA AND VOMITING  Dairy Products          NAUSEA AND VOMITING  Gluten Meal             NAUSEA AND VOMITING  Halibut Liver Oil [*    NAUSEA AND VOMITING  Peanuts                 NAUSEA AND VOMITING  Reglan [Metoclopram*    JITTERY  Tree Nuts               NAUSEA AND VOMITING  Yeast-Related           NAUSEA AND VOMITING    MEDICATIONS:  HYDROcodone-acetaminophen 7.5-325 MG Oral Tab, Take 2 tablets by mouth every 6 (six) hours as needed for Pain., Disp: 100 tablet, Rfl: 0  fentaNYL 25 MCG/HR Transdermal Patch 72 Hr, Place 1 patch onto the skin every third day., Disp: 10 patch, Rfl: 0  HYDROcodone-acetaminophen  MG Oral Tab, Take 2 tablets by mouth every 6 (six) hours as needed for Pain., Disp: 100 tablet, Rfl: 0  Calcium 200 MG Oral Tab, Take 1 tablet by mouth daily. , Disp: , Rfl:   Magnesium 200 MG Oral Tab, Take 1 tablet (200 mg total) by mouth daily. , Disp: , Rfl:   melatonin 1 MG Oral Tab, Take 1 tablet (1 mg total) by mouth nightly as needed. , Disp: , Rfl:   NON FORMULARY, Take 1 tablet by mouth nightly as needed. Hylands restful legs, Disp: , Rfl:   amphetamine-dextroamphetamine (ADDERALL) 20 MG Oral Tab, Take 1 tablet (20 mg total) by mouth 2 (two) times daily. , Disp: 60 tablet, Rfl: 0  ATORVASTATIN 10 MG Oral Tab, TAKE 1 TABLET(10 MG) BY MOUTH DAILY, Disp: 90 tablet, Rfl: 3  ONDANSETRON 4 MG Oral Tablet Dispersible, DISSOLVE 1 TABLET(4 MG) ON THE TONGUE EVERY 8 HOURS AS NEEDED FOR NAUSEA, Disp: 60 tablet, Rfl: 3  VENLAFAXINE  MG Oral Capsule SR 24 Hr, TAKE 2 CAPSULES BY MOUTH EVERY DAY, Disp: 180 capsule, Rfl: 3  levothyroxine 100 MCG Oral Tab, Take 1 tablet (100 mcg total) by mouth before breakfast., Disp: 90 tablet, Rfl: 3  traZODone 100 MG Oral Tab, Take 1-1.5 tablets (100-150 mg total) by mouth nightly as needed for Sleep., Disp: 135 tablet, Rfl: 3    No current facility-administered medications on file prior to visit. REVIEW OF SYSTEMS:  Review of Systems   All other systems reviewed and are negative. PHYSICAL EXAMINATION:  Neurologic Exam     Mental Status   Oriented to person, place, and time. Cranial Nerves   Cranial nerves II through XII intact. Motor Exam   Muscle bulk: normal  Overall muscle tone: normal  Right arm tone: normal  Left arm tone: normal  Right arm pronator drift: absent  Left arm pronator drift: absent  Right leg tone: normal  Left leg tone: normal    Strength   Strength 5/5 throughout. Sensory Exam   Light touch normal.   Vibration normal.   Proprioception normal.     Gait, Coordination, and Reflexes     Gait  Gait: normal    Reflexes   Right brachioradialis: 2+  Left brachioradialis: 2+  Right biceps: 2+  Left biceps: 2+  Right triceps: 2+  Left triceps: 2+  Right patellar: 2+  Left patellar: 2+  Right achilles: 2+  Left achilles: 2+  Right : 2+  Left : 2+       IMAGING:  As above    ASSESSMENT:  RLE radiculopathy    Plan:  Plan for R L4-5 NEHAL  I discussed in detail the risks and benefits of R L4-5 microdiscectomy. Possible complications include but are not limited to: death, paralysis, sensory loss, bowel and/or bladder incontinence, infection, hemorrhage, cerebral spinal fluid leak, recurrent disc herniation and finally no benefit from surgery. The above was discussed with the patient and the family and consent was obtained.      Berna Daivs, DO  Neurological Surgery  [facility]

## 2023-10-04 NOTE — PROGRESS NOTES
[facility]  Neurological Surgery Clinic Note    Dominic Suarez  10/5/1965  [id number]  PCP: Suzanne Jauregui MD    REASON FOR VISIT:  RLE radiculopathy    HISTORY OF PRESENT ILLNESS:  Dominic Suarez is a(n) 59year old female with RLE radiculopathy. 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 Discussed plan for R L4-5 NEHAL. Patient with scoliosis involving the thoracolumbar spine. She has no new symptoms. She has no weakness. She has CPS (chronic pain syndrome) and is on Norco 10/325 and Fentanyl patches chronically. Denies any bowel/bladder habit changes. 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 last tx was in 2017    Hx of motion sickness     In the car    Intrauterine device 1997    none now    Kidney infection 01/2017    Thyroid disease     Visual impairment     contacts       PAST SURGICAL HISTORY:  Past Surgical History:   Procedure Laterality Date    COLONOSCOPY N/A 5/24/2016    Procedure: COLONOSCOPY;  Surgeon: Buzz Harris MD;  Location: [address] [place] [address] ENDOSCOPY    OTHER 2016    central line placement    OTHER SURGICAL HISTORY N/A 5/24/2016    Procedure: ESOPHAGOGASTRODUODENOSCOPY (EGD);   Surgeon: Buzz Harris MD;  Location: [address] [place] [address] ENDOSCOPY       FAMILY HISTORY:  family history includes Cancer in her father, mother, paternal grandfather, and paternal grandmother; Heart Disease in her father; Heart Disorder in her maternal grandfather and maternal grandmother; Stroke in her maternal grandfather; Thyroid Disorder in her sister. SOCIAL HISTORY:   reports that she has never smoked. She has never been exposed to tobacco smoke. She has never used smokeless tobacco. She reports that she does not drink alcohol and does not use drugs. ALLERGIES:    Lidocaine               OTHER (SEE COMMENTS)    Comment:Backaches, TNS transient neurological syndrome,             others in this class are not an issue  Blueberry               NAUSEA AND VOMITING  Dairy Products          NAUSEA AND VOMITING  Gluten Meal             NAUSEA AND VOMITING  Halibut Liver Oil [*    NAUSEA AND VOMITING  Peanuts                 NAUSEA AND VOMITING  Reglan [Metoclopram*    JITTERY  Tree Nuts               NAUSEA AND VOMITING  Yeast-Related           NAUSEA AND VOMITING    MEDICATIONS:  HYDROcodone-acetaminophen 7.5-325 MG Oral Tab, Take 2 tablets by mouth every 6 (six) hours as needed for Pain., Disp: 100 tablet, Rfl: 0  fentaNYL 25 MCG/HR Transdermal Patch 72 Hr, Place 1 patch onto the skin every third day., Disp: 10 patch, Rfl: 0  HYDROcodone-acetaminophen  MG Oral Tab, Take 2 tablets by mouth every 6 (six) hours as needed for Pain., Disp: 100 tablet, Rfl: 0  Calcium 200 MG Oral Tab, Take 1 tablet by mouth daily. , Disp: , Rfl:   Magnesium 200 MG Oral Tab, Take 1 tablet (200 mg total) by mouth daily. , Disp: , Rfl:   melatonin 1 MG Oral Tab, Take 1 tablet (1 mg total) by mouth nightly as needed. , Disp: , Rfl:   NON FORMULARY, Take 1 tablet by mouth nightly as needed. Hylands restful legs, Disp: , Rfl:   amphetamine-dextroamphetamine (ADDERALL) 20 MG Oral Tab, Take 1 tablet (20 mg total) by mouth 2 (two) times daily. , Disp: 60 tablet, Rfl: 0  ATORVASTATIN 10 MG Oral Tab, TAKE 1 TABLET(10 MG) BY MOUTH DAILY, Disp: 90 tablet, Rfl: 3  ONDANSETRON 4 MG Oral Tablet Dispersible, DISSOLVE 1 TABLET(4 MG) ON THE TONGUE EVERY 8 HOURS AS NEEDED FOR NAUSEA, Disp: 60 tablet, Rfl: 3  VENLAFAXINE  MG Oral Capsule SR 24 Hr, TAKE 2 CAPSULES BY MOUTH EVERY DAY, Disp: 180 capsule, Rfl: 3  levothyroxine 100 MCG Oral Tab, Take 1 tablet (100 mcg total) by mouth before breakfast., Disp: 90 tablet, Rfl: 3  traZODone 100 MG Oral Tab, Take 1-1.5 tablets (100-150 mg total) by mouth nightly as needed for Sleep., Disp: 135 tablet, Rfl: 3    No current facility-administered medications on file prior to visit. REVIEW OF SYSTEMS:  Review of Systems   All other systems reviewed and are negative. PHYSICAL EXAMINATION:  Neurologic Exam     Mental Status   Oriented to person, place, and time. Cranial Nerves   Cranial nerves II through XII intact. Motor Exam   Muscle bulk: normal  Overall muscle tone: normal  Right arm tone: normal  Left arm tone: normal  Right arm pronator drift: absent  Left arm pronator drift: absent  Right leg tone: normal  Left leg tone: normal    Strength   Strength 5/5 throughout. Sensory Exam   Light touch normal.   Vibration normal.   Proprioception normal.     Gait, Coordination, and Reflexes     Gait  Gait: normal    Reflexes   Right brachioradialis: 2+  Left brachioradialis: 2+  Right biceps: 2+  Left biceps: 2+  Right triceps: 2+  Left triceps: 2+  Right patellar: 2+  Left patellar: 2+  Right achilles: 2+  Left achilles: 2+  Right : 2+  Left : 2+       IMAGING:  As above    ASSESSMENT:  RLE radiculopathy    Plan:  Plan for R L4-5 NEHAL  I discussed in detail the risks and benefits of R L4-5 microdiscectomy. Possible complications include but are not limited to: death, paralysis, sensory loss, bowel and/or bladder incontinence, infection, hemorrhage, cerebral spinal fluid leak, recurrent disc herniation and finally no benefit from surgery. The above was discussed with the patient and the family and consent was obtained.      Misty Roman, DO  Neurological Surgery  [facility]

## 2023-10-04 NOTE — TELEPHONE ENCOUNTER
Spoke with AGNIEZSKA Marquez and Dr. Goodman that the pt may have a video visit for her appt.    Notified front office to change appt type. PSR acknowledged.   Notified pt that her request for a video visit was approved for 1pm pt acknowledged and is appreciative of the call

## 2023-10-12 ENCOUNTER — ANESTHESIA (OUTPATIENT)
Dept: SURGERY | Facility: HOSPITAL | Age: 58
End: 2023-10-12
Payer: MEDICARE

## 2023-10-12 ENCOUNTER — HOSPITAL ENCOUNTER (OUTPATIENT)
Facility: HOSPITAL | Age: 58
Discharge: HOME | End: 2023-10-13
Attending: NEUROLOGICAL SURGERY | Admitting: NEUROLOGICAL SURGERY
Payer: MEDICARE

## 2023-10-12 ENCOUNTER — APPOINTMENT (OUTPATIENT)
Dept: GENERAL RADIOLOGY | Facility: HOSPITAL | Age: 58
End: 2023-10-12
Attending: NEUROLOGICAL SURGERY
Payer: MEDICARE

## 2023-10-12 ENCOUNTER — ANESTHESIA EVENT (OUTPATIENT)
Dept: SURGERY | Facility: HOSPITAL | Age: 58
End: 2023-10-12
Payer: MEDICARE

## 2023-10-12 DIAGNOSIS — G89.4 CHRONIC PAIN SYNDROME: ICD-10-CM

## 2023-10-12 DIAGNOSIS — M48.061 LUMBAR FORAMINAL STENOSIS: ICD-10-CM

## 2023-10-12 DIAGNOSIS — M54.50 LUMBAR PAIN: ICD-10-CM

## 2023-10-12 DIAGNOSIS — M54.16 RIGHT LUMBAR RADICULOPATHY: Primary | ICD-10-CM

## 2023-10-12 DIAGNOSIS — M51.36 DDD (DEGENERATIVE DISC DISEASE), LUMBAR: ICD-10-CM

## 2023-10-12 PROCEDURE — 01NB0ZZ RELEASE LUMBAR NERVE, OPEN APPROACH: ICD-10-PCS | Performed by: NEUROLOGICAL SURGERY

## 2023-10-12 PROCEDURE — 00NY0ZZ RELEASE LUMBAR SPINAL CORD, OPEN APPROACH: ICD-10-PCS | Performed by: NEUROLOGICAL SURGERY

## 2023-10-12 PROCEDURE — 99205 OFFICE O/P NEW HI 60 MIN: CPT | Performed by: HOSPITALIST

## 2023-10-12 PROCEDURE — 0ST20ZZ RESECTION OF LUMBAR VERTEBRAL DISC, OPEN APPROACH: ICD-10-PCS | Performed by: NEUROLOGICAL SURGERY

## 2023-10-12 PROCEDURE — 72020 X-RAY EXAM OF SPINE 1 VIEW: CPT | Performed by: NEUROLOGICAL SURGERY

## 2023-10-12 RX ORDER — ACETAMINOPHEN 500 MG
1000 TABLET ORAL ONCE AS NEEDED
Status: DISCONTINUED | OUTPATIENT
Start: 2023-10-12 | End: 2023-10-12 | Stop reason: HOSPADM

## 2023-10-12 RX ORDER — FENTANYL 25 UG/1
1 PATCH TRANSDERMAL
Status: DISCONTINUED | OUTPATIENT
Start: 2023-10-12 | End: 2023-10-13

## 2023-10-12 RX ORDER — DIPHENHYDRAMINE HYDROCHLORIDE 50 MG/ML
25 INJECTION INTRAMUSCULAR; INTRAVENOUS EVERY 4 HOURS PRN
Status: DISCONTINUED | OUTPATIENT
Start: 2023-10-12 | End: 2023-10-13

## 2023-10-12 RX ORDER — HYDROMORPHONE HYDROCHLORIDE 1 MG/ML
0.2 INJECTION, SOLUTION INTRAMUSCULAR; INTRAVENOUS; SUBCUTANEOUS EVERY 5 MIN PRN
Status: DISCONTINUED | OUTPATIENT
Start: 2023-10-12 | End: 2023-10-12 | Stop reason: HOSPADM

## 2023-10-12 RX ORDER — CEFAZOLIN SODIUM/WATER 2 G/20 ML
2 SYRINGE (ML) INTRAVENOUS EVERY 8 HOURS
Qty: 40 ML | Refills: 0 | Status: COMPLETED | OUTPATIENT
Start: 2023-10-12 | End: 2023-10-13

## 2023-10-12 RX ORDER — ATORVASTATIN CALCIUM 10 MG/1
10 TABLET, FILM COATED ORAL NIGHTLY
COMMUNITY

## 2023-10-12 RX ORDER — HYDROMORPHONE HYDROCHLORIDE 1 MG/ML
INJECTION, SOLUTION INTRAMUSCULAR; INTRAVENOUS; SUBCUTANEOUS
Status: COMPLETED
Start: 2023-10-12 | End: 2023-10-12

## 2023-10-12 RX ORDER — MIDAZOLAM HYDROCHLORIDE 1 MG/ML
INJECTION INTRAMUSCULAR; INTRAVENOUS
Status: DISCONTINUED
Start: 2023-10-12 | End: 2023-10-12 | Stop reason: WASHOUT

## 2023-10-12 RX ORDER — HYDROMORPHONE HYDROCHLORIDE 1 MG/ML
0.4 INJECTION, SOLUTION INTRAMUSCULAR; INTRAVENOUS; SUBCUTANEOUS EVERY 5 MIN PRN
Status: DISCONTINUED | OUTPATIENT
Start: 2023-10-12 | End: 2023-10-12 | Stop reason: HOSPADM

## 2023-10-12 RX ORDER — SODIUM CHLORIDE 9 MG/ML
INJECTION, SOLUTION INTRAVENOUS CONTINUOUS
Status: DISCONTINUED | OUTPATIENT
Start: 2023-10-12 | End: 2023-10-13

## 2023-10-12 RX ORDER — SENNOSIDES 8.6 MG
17.2 TABLET ORAL NIGHTLY
Status: DISCONTINUED | OUTPATIENT
Start: 2023-10-12 | End: 2023-10-13

## 2023-10-12 RX ORDER — VENLAFAXINE HYDROCHLORIDE 75 MG/1
150 CAPSULE, EXTENDED RELEASE ORAL
Status: DISCONTINUED | OUTPATIENT
Start: 2023-10-13 | End: 2023-10-13

## 2023-10-12 RX ORDER — IBUPROFEN 600 MG/1
600 TABLET ORAL ONCE AS NEEDED
Status: DISCONTINUED | OUTPATIENT
Start: 2023-10-12 | End: 2023-10-12 | Stop reason: HOSPADM

## 2023-10-12 RX ORDER — PROCHLORPERAZINE EDISYLATE 5 MG/ML
5 INJECTION INTRAMUSCULAR; INTRAVENOUS EVERY 8 HOURS PRN
Status: DISCONTINUED | OUTPATIENT
Start: 2023-10-12 | End: 2023-10-13

## 2023-10-12 RX ORDER — POLYETHYLENE GLYCOL 3350 17 G/17G
17 POWDER, FOR SOLUTION ORAL DAILY PRN
Status: DISCONTINUED | OUTPATIENT
Start: 2023-10-12 | End: 2023-10-13

## 2023-10-12 RX ORDER — ENEMA 19; 7 G/133ML; G/133ML
1 ENEMA RECTAL ONCE AS NEEDED
Status: DISCONTINUED | OUTPATIENT
Start: 2023-10-12 | End: 2023-10-13

## 2023-10-12 RX ORDER — CEFAZOLIN SODIUM/WATER 2 G/20 ML
SYRINGE (ML) INTRAVENOUS
Status: DISPENSED
Start: 2023-10-12 | End: 2023-10-13

## 2023-10-12 RX ORDER — DOCUSATE SODIUM 100 MG/1
100 CAPSULE, LIQUID FILLED ORAL 2 TIMES DAILY
Status: DISCONTINUED | OUTPATIENT
Start: 2023-10-12 | End: 2023-10-13

## 2023-10-12 RX ORDER — HYDROCODONE BITARTRATE AND ACETAMINOPHEN 10; 325 MG/1; MG/1
2 TABLET ORAL EVERY 6 HOURS PRN
Status: DISCONTINUED | OUTPATIENT
Start: 2023-10-12 | End: 2023-10-13

## 2023-10-12 RX ORDER — SODIUM CHLORIDE, SODIUM LACTATE, POTASSIUM CHLORIDE, CALCIUM CHLORIDE 600; 310; 30; 20 MG/100ML; MG/100ML; MG/100ML; MG/100ML
INJECTION, SOLUTION INTRAVENOUS CONTINUOUS
Status: DISCONTINUED | OUTPATIENT
Start: 2023-10-12 | End: 2023-10-13

## 2023-10-12 RX ORDER — BUPIVACAINE HYDROCHLORIDE AND EPINEPHRINE 5; 5 MG/ML; UG/ML
INJECTION, SOLUTION EPIDURAL; INTRACAUDAL; PERINEURAL AS NEEDED
Status: DISCONTINUED | OUTPATIENT
Start: 2023-10-12 | End: 2023-10-12 | Stop reason: HOSPADM

## 2023-10-12 RX ORDER — HYDROCODONE BITARTRATE AND ACETAMINOPHEN 5; 325 MG/1; MG/1
2 TABLET ORAL ONCE AS NEEDED
Status: DISCONTINUED | OUTPATIENT
Start: 2023-10-12 | End: 2023-10-12 | Stop reason: HOSPADM

## 2023-10-12 RX ORDER — CEFAZOLIN SODIUM/WATER 2 G/20 ML
2 SYRINGE (ML) INTRAVENOUS ONCE
Status: COMPLETED | OUTPATIENT
Start: 2023-10-12 | End: 2023-10-12

## 2023-10-12 RX ORDER — ACETAMINOPHEN 10 MG/ML
750 INJECTION, SOLUTION INTRAVENOUS ONCE
Status: COMPLETED | OUTPATIENT
Start: 2023-10-12 | End: 2023-10-12

## 2023-10-12 RX ORDER — MIDAZOLAM HYDROCHLORIDE 1 MG/ML
1 INJECTION INTRAMUSCULAR; INTRAVENOUS EVERY 5 MIN PRN
Status: DISCONTINUED | OUTPATIENT
Start: 2023-10-12 | End: 2023-10-12 | Stop reason: HOSPADM

## 2023-10-12 RX ORDER — ONDANSETRON 2 MG/ML
4 INJECTION INTRAMUSCULAR; INTRAVENOUS EVERY 6 HOURS PRN
Status: DISCONTINUED | OUTPATIENT
Start: 2023-10-12 | End: 2023-10-13

## 2023-10-12 RX ORDER — DEXAMETHASONE SODIUM PHOSPHATE 4 MG/ML
VIAL (ML) INJECTION AS NEEDED
Status: DISCONTINUED | OUTPATIENT
Start: 2023-10-12 | End: 2023-10-12 | Stop reason: SURG

## 2023-10-12 RX ORDER — PROCHLORPERAZINE EDISYLATE 5 MG/ML
5 INJECTION INTRAMUSCULAR; INTRAVENOUS EVERY 8 HOURS PRN
Status: DISCONTINUED | OUTPATIENT
Start: 2023-10-12 | End: 2023-10-12 | Stop reason: HOSPADM

## 2023-10-12 RX ORDER — HYDROMORPHONE HYDROCHLORIDE 1 MG/ML
0.4 INJECTION, SOLUTION INTRAMUSCULAR; INTRAVENOUS; SUBCUTANEOUS EVERY 2 HOUR PRN
Status: DISCONTINUED | OUTPATIENT
Start: 2023-10-12 | End: 2023-10-13

## 2023-10-12 RX ORDER — DEXTROAMPHETAMINE SACCHARATE, AMPHETAMINE ASPARTATE, DEXTROAMPHETAMINE SULFATE AND AMPHETAMINE SULFATE 5; 5; 5; 5 MG/1; MG/1; MG/1; MG/1
20 TABLET ORAL 2 TIMES DAILY
COMMUNITY

## 2023-10-12 RX ORDER — KETAMINE HYDROCHLORIDE 50 MG/ML
INJECTION, SOLUTION, CONCENTRATE INTRAMUSCULAR; INTRAVENOUS AS NEEDED
Status: DISCONTINUED | OUTPATIENT
Start: 2023-10-12 | End: 2023-10-12 | Stop reason: SURG

## 2023-10-12 RX ORDER — DIPHENHYDRAMINE HCL 25 MG
25 CAPSULE ORAL EVERY 4 HOURS PRN
Status: DISCONTINUED | OUTPATIENT
Start: 2023-10-12 | End: 2023-10-13

## 2023-10-12 RX ORDER — ONDANSETRON 2 MG/ML
4 INJECTION INTRAMUSCULAR; INTRAVENOUS EVERY 6 HOURS PRN
Status: DISCONTINUED | OUTPATIENT
Start: 2023-10-12 | End: 2023-10-12 | Stop reason: HOSPADM

## 2023-10-12 RX ORDER — SODIUM CHLORIDE, SODIUM LACTATE, POTASSIUM CHLORIDE, CALCIUM CHLORIDE 600; 310; 30; 20 MG/100ML; MG/100ML; MG/100ML; MG/100ML
INJECTION, SOLUTION INTRAVENOUS CONTINUOUS
Status: DISCONTINUED | OUTPATIENT
Start: 2023-10-12 | End: 2023-10-12 | Stop reason: HOSPADM

## 2023-10-12 RX ORDER — ACETAMINOPHEN 500 MG
TABLET ORAL
Status: COMPLETED
Start: 2023-10-12 | End: 2023-10-12

## 2023-10-12 RX ORDER — TRAZODONE HYDROCHLORIDE 100 MG/1
100 TABLET ORAL NIGHTLY PRN
Status: DISCONTINUED | OUTPATIENT
Start: 2023-10-12 | End: 2023-10-13

## 2023-10-12 RX ORDER — HYDROCODONE BITARTRATE AND ACETAMINOPHEN 5; 325 MG/1; MG/1
1 TABLET ORAL ONCE AS NEEDED
Status: DISCONTINUED | OUTPATIENT
Start: 2023-10-12 | End: 2023-10-12 | Stop reason: HOSPADM

## 2023-10-12 RX ORDER — ACETAMINOPHEN 500 MG
1000 TABLET ORAL ONCE
Status: COMPLETED | OUTPATIENT
Start: 2023-10-12 | End: 2023-10-12

## 2023-10-12 RX ORDER — DIAZEPAM 10 MG/1
10 TABLET ORAL EVERY 8 HOURS PRN
Status: DISCONTINUED | OUTPATIENT
Start: 2023-10-12 | End: 2023-10-13

## 2023-10-12 RX ORDER — ROCURONIUM BROMIDE 10 MG/ML
INJECTION, SOLUTION INTRAVENOUS AS NEEDED
Status: DISCONTINUED | OUTPATIENT
Start: 2023-10-12 | End: 2023-10-12 | Stop reason: SURG

## 2023-10-12 RX ORDER — ONDANSETRON 2 MG/ML
INJECTION INTRAMUSCULAR; INTRAVENOUS AS NEEDED
Status: DISCONTINUED | OUTPATIENT
Start: 2023-10-12 | End: 2023-10-12 | Stop reason: SURG

## 2023-10-12 RX ORDER — NEOSTIGMINE METHYLSULFATE 1 MG/ML
INJECTION, SOLUTION INTRAVENOUS AS NEEDED
Status: DISCONTINUED | OUTPATIENT
Start: 2023-10-12 | End: 2023-10-12 | Stop reason: SURG

## 2023-10-12 RX ORDER — KETOROLAC TROMETHAMINE 30 MG/ML
INJECTION, SOLUTION INTRAMUSCULAR; INTRAVENOUS AS NEEDED
Status: DISCONTINUED | OUTPATIENT
Start: 2023-10-12 | End: 2023-10-12 | Stop reason: SURG

## 2023-10-12 RX ORDER — HYDROCODONE BITARTRATE AND ACETAMINOPHEN 10; 325 MG/1; MG/1
1 TABLET ORAL EVERY 6 HOURS PRN
Status: DISCONTINUED | OUTPATIENT
Start: 2023-10-12 | End: 2023-10-13

## 2023-10-12 RX ORDER — GLYCOPYRROLATE 0.2 MG/ML
INJECTION, SOLUTION INTRAMUSCULAR; INTRAVENOUS AS NEEDED
Status: DISCONTINUED | OUTPATIENT
Start: 2023-10-12 | End: 2023-10-12 | Stop reason: SURG

## 2023-10-12 RX ORDER — NALOXONE HYDROCHLORIDE 0.4 MG/ML
0.08 INJECTION, SOLUTION INTRAMUSCULAR; INTRAVENOUS; SUBCUTANEOUS AS NEEDED
Status: DISCONTINUED | OUTPATIENT
Start: 2023-10-12 | End: 2023-10-12 | Stop reason: HOSPADM

## 2023-10-12 RX ORDER — HYDROMORPHONE HYDROCHLORIDE 1 MG/ML
0.6 INJECTION, SOLUTION INTRAMUSCULAR; INTRAVENOUS; SUBCUTANEOUS EVERY 5 MIN PRN
Status: DISCONTINUED | OUTPATIENT
Start: 2023-10-12 | End: 2023-10-12 | Stop reason: HOSPADM

## 2023-10-12 RX ORDER — MIDAZOLAM HYDROCHLORIDE 1 MG/ML
INJECTION INTRAMUSCULAR; INTRAVENOUS AS NEEDED
Status: DISCONTINUED | OUTPATIENT
Start: 2023-10-12 | End: 2023-10-12 | Stop reason: SURG

## 2023-10-12 RX ORDER — ACETAMINOPHEN 10 MG/ML
INJECTION, SOLUTION INTRAVENOUS
Status: COMPLETED
Start: 2023-10-12 | End: 2023-10-12

## 2023-10-12 RX ORDER — ATORVASTATIN CALCIUM 10 MG/1
10 TABLET, FILM COATED ORAL NIGHTLY
Status: DISCONTINUED | OUTPATIENT
Start: 2023-10-12 | End: 2023-10-13

## 2023-10-12 RX ORDER — MAGNESIUM OXIDE 400 MG (241.3 MG MAGNESIUM) TABLET
1 TABLET NIGHTLY PRN
Status: DISCONTINUED | OUTPATIENT
Start: 2023-10-12 | End: 2023-10-13

## 2023-10-12 RX ORDER — BISACODYL 10 MG
10 SUPPOSITORY, RECTAL RECTAL
Status: DISCONTINUED | OUTPATIENT
Start: 2023-10-12 | End: 2023-10-13

## 2023-10-12 RX ORDER — LEVOTHYROXINE SODIUM 0.1 MG/1
100 TABLET ORAL
Status: DISCONTINUED | OUTPATIENT
Start: 2023-10-12 | End: 2023-10-13

## 2023-10-12 RX ORDER — HYDROMORPHONE HYDROCHLORIDE 1 MG/ML
0.8 INJECTION, SOLUTION INTRAMUSCULAR; INTRAVENOUS; SUBCUTANEOUS EVERY 2 HOUR PRN
Status: DISCONTINUED | OUTPATIENT
Start: 2023-10-12 | End: 2023-10-13

## 2023-10-12 RX ADMIN — SODIUM CHLORIDE, SODIUM LACTATE, POTASSIUM CHLORIDE, CALCIUM CHLORIDE: 600; 310; 30; 20 INJECTION, SOLUTION INTRAVENOUS at 13:54:00

## 2023-10-12 RX ADMIN — CEFAZOLIN SODIUM/WATER 2 G: 2 G/20 ML SYRINGE (ML) INTRAVENOUS at 13:36:00

## 2023-10-12 RX ADMIN — MIDAZOLAM HYDROCHLORIDE 2 MG: 1 INJECTION INTRAMUSCULAR; INTRAVENOUS at 13:37:00

## 2023-10-12 RX ADMIN — ONDANSETRON 4 MG: 2 INJECTION INTRAMUSCULAR; INTRAVENOUS at 13:54:00

## 2023-10-12 RX ADMIN — SODIUM CHLORIDE, SODIUM LACTATE, POTASSIUM CHLORIDE, CALCIUM CHLORIDE: 600; 310; 30; 20 INJECTION, SOLUTION INTRAVENOUS at 15:25:00

## 2023-10-12 RX ADMIN — KETAMINE HYDROCHLORIDE 25 MG: 50 INJECTION, SOLUTION, CONCENTRATE INTRAMUSCULAR; INTRAVENOUS at 13:54:00

## 2023-10-12 RX ADMIN — ROCURONIUM BROMIDE 40 MG: 10 INJECTION, SOLUTION INTRAVENOUS at 13:53:00

## 2023-10-12 RX ADMIN — KETOROLAC TROMETHAMINE 30 MG: 30 INJECTION, SOLUTION INTRAMUSCULAR; INTRAVENOUS at 15:01:00

## 2023-10-12 RX ADMIN — ROCURONIUM BROMIDE 10 MG: 10 INJECTION, SOLUTION INTRAVENOUS at 13:40:00

## 2023-10-12 RX ADMIN — NEOSTIGMINE METHYLSULFATE 4 MG: 1 INJECTION, SOLUTION INTRAVENOUS at 15:07:00

## 2023-10-12 RX ADMIN — DEXAMETHASONE SODIUM PHOSPHATE 8 MG: 4 MG/ML VIAL (ML) INJECTION at 13:54:00

## 2023-10-12 RX ADMIN — GLYCOPYRROLATE 0.4 MG: 0.2 INJECTION, SOLUTION INTRAMUSCULAR; INTRAVENOUS at 15:07:00

## 2023-10-12 NOTE — ANESTHESIA PROCEDURE NOTES
Airway  Date/Time: 10/12/2023 1:51 PM  Urgency: elective      General Information and Staff    Patient location during procedure: OR  Anesthesiologist: Viridiana Parry MD  Performed: anesthesiologist   Performed by: Viridiana Parry MD  Authorized by: Viridiana Parry MD      Indications and Patient Condition  Indications for airway management: anesthesia  Sedation level: deep  Preoxygenated: yes  Patient position: sniffing  Mask difficulty assessment: 1 - vent by mask    Final Airway Details  Final airway type: endotracheal airway      Successful airway: ETT  Cuffed: yes   Successful intubation technique: direct laryngoscopy  Endotracheal tube insertion site: oral  Blade: Tripp  Blade size: #4  ETT size (mm): 7.0    Placement verified by: capnometry   Measured from: lips  Number of attempts at approach: 1

## 2023-10-12 NOTE — ANESTHESIA POSTPROCEDURE EVALUATION
[facility] Patient Status:  Outpatient in a Bed   Age/Gender 59year old female MRN [id number]   Location [address] [facility] Attending Miguel Mitchell DO   Hosp Day # 0 PCP Magno Godinez MD       Anesthesia Post-op Note    right lumbar 4-5 microdiscectomy    Procedure Summary       Date: 10/12/23 Room / Location: [address] [place] [address] MAIN OR 16 / [facility] OR    Anesthesia Start: 2851 Anesthesia Stop:     Procedure: right lumbar 4-5 microdiscectomy (Right: Spine Lumbar) Diagnosis:       Right lumbar radiculopathy      Chronic pain syndrome      Lumbar pain      DDD (degenerative disc disease), lumbar      Lumbar foraminal stenosis      (Right lumbar radiculopathy [M54.16]Chronic pain syndrome [G89. 4]Lumbar pain [M54.50]DDD (degenerative disc disease), lumbar [M51.36]Lumbar foraminal stenosis [A71.539])    Surgeons: Migule Mitchell DO Anesthesiologist: Bernie Bella MD    Anesthesia Type: general ASA Status: 2            Anesthesia Type: general    Vitals Value Taken Time   /61 10/12/23 1526   Temp 97.7 10/12/23 1526   Pulse 75 10/12/23 1526   Resp 16 10/12/23 1526   SpO2 99 10/12/23 1526       Patient Location: PACU    Anesthesia Type: general    Airway Patency: patent    Postop Pain Control: adequate    Mental Status: mildly sedated but able to meaningfully participate in the post-anesthesia evaluation    Nausea/Vomiting: none    Cardiopulmonary/Hydration status: stable euvolemic    Complications: no apparent anesthesia related complications    Postop vital signs: stable    Dental Exam: Unchanged from Preop    Patient to be discharged from PACU when criteria met.

## 2023-10-12 NOTE — OR PREOP
Patient wears specialty contact lens and would like to leave them in during surgery.  I spoke with Dr Edwige Serrano He said ,as long as the patients understands the risk of a potential corneal abrasion she can leave them in.  patient is aware and would like to leave them in

## 2023-10-12 NOTE — OPERATIVE REPORT
Date of surgery  10/12/23      Preoperative dx  lumbar radiculopathy      Postoperative dx  right L4-5 HNP      Procedure [please list them in numbered format]   1. Posterior midline approach to lumbar spine from L4-5   2. (R side) laminectomies at levels L4-5, medial facetectomies at levels L4-5, and foraminotomies at levels L4-5   3. Use of microscope for decompression   4. R-sided [Micro]Diskectomy at L4-5   5. Use of radiographs for vertebral level localization, and for confirmation of appropriate hardware placement      Surgeon[s]  [address] [place]      Anesthesia  GETA      IVF  1250 EBL 5   Uout  NR   Specimen  none      Drains  none     Complications  none     Condition  Stable to PACU      Indications      This is a 58 YO F with intractable back pain and RLE radiculopathy, having failed [all nonoperative management]. It was mutually decided that surgical intervention was the best option at this point. All risks and benefits of the surgery were explained to the patient, and he/she wishes to proceed with the surgery. Details of surgery      Patient was taken to the OR, and placed under general anesthesia. [SHe] was then turned prone onto an OR table with Victorino frame. All bony prominences were well-padded. The lower back was then prepped and draped in standard fashion. A 10-blade scalpel was used to make a vertical longitudinal incision approximately spanning the posterior processes of L4-5. Bovie cautery was then used to deepen the incision thru the subcutaneous tissues and fascia down to the spinous processes. A Kocher clamp was applied to a spinous process and a lateral radiograph taken to confirm the vertebral level. With the level radiographically confirmed, the posterior elements of the vertebral bodies from the inferior half of L4 to the superior half of L5 were exposed in subperiosteal fashion. Self retaining retractors were placed for good self-retaining exposure.  Andrea Prom microscope was positioned at this point.] The decompression was then begun. R sided laminotomy was performed with a high speed chanell and Kerrison rongeurs of various sizes, along with bilateral medial facetectomies and foraminotomies. The nerve root was visualized and a large subligamentous disc herniation was noted and carefully removed with protective retractors on the dura and nerve root. With removal of the extrusion both the nerve root and retractor appeared to be in a relaxed position. Disc space irrigation was placed within the disc space and no further fragments were noted. Closure was then commenced. The fascia was closed using #1 Vicryl in interrupted fashion. The subcutaneous tissue was closed using #1 Vicryl as well. The dermis was closed using 2-0 Vicryl in interrupted fashion. The skin was closed with a subcuticular 3-0 Biosyn running suture. Mastisol and steri-strips were applied. Xeroform was then applied, followed by 4x4 gauze and microporous tape to secure the dressing and drain. All needle and sponge counts were correct. There were no complications during this surgery. The patient was revived, extubated, and taken to recovery room in stable condition.

## 2023-10-12 NOTE — PLAN OF CARE
A&Ox4. VSS. On room air. . IS encouraged. SCDs and teds. Ankle pumps encouraged. Last BM 10/10. Due to void by 2300. Denies need for pain medication at this time. Dressing to back C/D/I. Plan is for PT/OT eval. Patient updated and in agreement with plan of care. Safety precautions in place. Instructed patient to call for assistance, call light within reach.

## 2023-10-12 NOTE — BRIEF OP NOTE
Pre-Operative Diagnosis: Right lumbar radiculopathy [M54.16]  Chronic pain syndrome [G89.4]  Lumbar pain [M54.50]  DDD (degenerative disc disease), lumbar [M51.36]  Lumbar foraminal stenosis [M48.061]     Post-Operative Diagnosis: Right lumbar radiculopathy [M54.16]Chronic pain syndrome [G89. 4]Lumbar pain [M54.50]DDD (degenerative disc disease), lumbar [M51.36]Lumbar foraminal stenosis [M48.061]      Procedure Performed:   right lumbar 4-5 microdiscectomy    Surgeon(s) and Role:     Meliza Jacome DO - Primary    Assistant(s):  Surgical Assistant.: Marlyn Vargas     Surgical Findings: severe stenosis     Specimen: none     Estimated Blood Loss: Blood Output: 10 mL (10/12/2023  3:02 PM)      Dictation Number:  to follow    [address] DO  10/12/2023  3:12 PM

## 2023-10-13 VITALS
DIASTOLIC BLOOD PRESSURE: 80 MMHG | HEART RATE: 56 BPM | OXYGEN SATURATION: 100 % | SYSTOLIC BLOOD PRESSURE: 122 MMHG | TEMPERATURE: 98 F | BODY MASS INDEX: 19.88 KG/M2 | WEIGHT: 108 LBS | RESPIRATION RATE: 18 BRPM | HEIGHT: 62 IN

## 2023-10-13 LAB
ANION GAP SERPL CALC-SCNC: 4 MMOL/L (ref 0–18)
BUN BLD-MCNC: 18 MG/DL (ref 7–18)
CALCIUM BLD-MCNC: 8.4 MG/DL (ref 8.5–10.1)
CHLORIDE SERPL-SCNC: 105 MMOL/L (ref 98–112)
CO2 SERPL-SCNC: 30 MMOL/L (ref 21–32)
CREAT BLD-MCNC: 1.01 MG/DL
EGFRCR SERPLBLD CKD-EPI 2021: 64 ML/MIN/1.73M2 (ref 60–?)
GLUCOSE BLD-MCNC: 117 MG/DL (ref 70–99)
HCT VFR BLD AUTO: 39.3 %
HGB BLD-MCNC: 12.8 G/DL
OSMOLALITY SERPL CALC.SUM OF ELEC: 291 MOSM/KG (ref 275–295)
POTASSIUM SERPL-SCNC: 4.1 MMOL/L (ref 3.5–5.1)
SODIUM SERPL-SCNC: 139 MMOL/L (ref 136–145)

## 2023-10-13 PROCEDURE — 99214 OFFICE O/P EST MOD 30 MIN: CPT | Performed by: HOSPITALIST

## 2023-10-13 RX ORDER — HYDROCODONE BITARTRATE AND ACETAMINOPHEN 10; 325 MG/1; MG/1
1-2 TABLET ORAL EVERY 6 HOURS PRN
Qty: 40 TABLET | Refills: 0 | Status: SHIPPED | OUTPATIENT
Start: 2023-10-13 | End: 2023-10-20

## 2023-10-13 RX ORDER — DIAZEPAM 10 MG/1
10 TABLET ORAL EVERY 8 HOURS PRN
Qty: 30 TABLET | Refills: 0 | Status: SHIPPED | OUTPATIENT
Start: 2023-10-13 | End: 2023-10-20

## 2023-10-13 NOTE — PLAN OF CARE
POD#1. AxO4. VSS on RA. Teds/SCD. Fentanyl patch on L arm. Voids w/o issue. Dressing to back D/I w/ small drainage. PRN PO pain meds available. PT/OT to see. Up w/ walker. Updated on POC. Safety measures placed. Care ongoing.

## 2023-10-13 NOTE — DISCHARGE INSTRUCTIONS
Lumbar Microdiscectomy Discharge Instructions     Activity  Restrictions  No twisting, pulling, pushing or lifting > 10 lbs. No lifting anything over your head. No bending at the waist  - you can bend at the knees but keep your back straight. Incision  You may shower 24 hours after surgery  Avoid direct water pressure to wound, may allow water to run over incision  Do not rub or scrub incision, pat to dry   Sitting  Sit with your knees at about the same level as your hips; use a footstool if needed. Do not sit in soft or overstuffed chairs. Firm chairs with straight backs give better support. Recliners are OK but may need to add pillows in order to not sink into the chair. Use a raised toilet seat if needed. Change position every 20-30 minutes when sitting (example: after sitting, stand, then you can sit again for another 20-30 minutes). Walking is your best exercise. Listen to your body. Walk several times a day  Smaller distances are better. Your goal is to increase the distance you walk each day.   Remember to listen to your body

## 2023-10-13 NOTE — PLAN OF CARE
Patient alert and oriented x4. VSS on RA. Pain controlled with PO PRN pain meds. Denies n/t. Telfa and tegaderm dressing to lower back, dry and intact. TEDs and SCDs in place, ankle pumps encouraged, IS encouraged. Pt up with standby and the walker. Tolerating diet, no c/o n/v. Voiding freely in bathroom.      Plan: discharge home when cleared by all services

## 2023-10-13 NOTE — PROGRESS NOTES
NURSING DISCHARGE NOTE    Discharged Home via Wheelchair. Accompanied by Support staff  Belongings Taken by patient/family. Patient cleared for discharge by hospitalist, spine, PT/OT. IV removed. Patient educated on all AVS discharge information. All questions answered. Instructed to  medication at pharmacy. Script for pain medication given to patient. Patient brought down to [facility] with all belongings to be driven home by .

## 2023-10-13 NOTE — PHYSICAL THERAPY NOTE
PT orders received. Chart reviewed. Attempted to see pt for therapy evaluation, however pt soundly asleep with the room completely dark. Will follow up when more alert and as schedule allows.

## 2023-10-13 NOTE — PHYSICAL THERAPY NOTE
PHYSICAL THERAPY EVALUATION - INPATIENT     Room Number: 357/357-A  Evaluation Date: 10/13/2023  Type of Evaluation: Initial  Physician Order: PT Eval and Treat    Presenting Problem: R L4-L5 microdisectomy  Co-Morbidities : hypothyroidism, limbic encephalitis, lumbar radiculopathy, chronic pain syndrome, degenerative disc disease  Reason for Therapy: Mobility Dysfunction and Discharge Planning    PROCEDURE: 10/12/23- Dr Amarilis Rodriguez -- right lumbar 4-5 microdiscectomy    ASSESSMENT   Pt is a 59year old female admitted on 10/12/2023 for R L4-L5 microdisectomy. Functional outcome measures completed include [facility]. The AM-PAC '6-Clicks' Inpatient Basic Mobility Short Form was completed and this patient is demonstrating a Approx Degree of Impairment: 20.91%  degree of impairment in mobility. Research supports that patients with this level of impairment may benefit from HOME. PT Discharge Recommendations: Home      PLAN  Patient has been evaluated and presents with no skilled Physical Therapy needs at this time. Patient discharged from Physical Therapy services. Please re-order if a new functional limitation presents during this admission. GOALS  Patient was able to achieve the following goals . .. Patient was able to transfer At previous, functional level  Safely and independently   Patient able to ambulate on level surfaces At previous, functional level  Safely and independently         HOME SITUATION  Type of Home: House   Home Layout: Two level;Bed/bath upstairs  Stairs to Enter : 2  Railing: Yes  Stairs to Bedroom: 13  Railing: Yes    Lives With: Spouse  Drives: No  Patient Owned Equipment: Cane; Wheelchair  Patient Regularly Uses: Reading glasses;Glasses    Prior Level of [place]: Per pt bed bound for the last 10-15 years due to brain disease. She lives in two story home with spouse; bed/bath upstairs. Ambulates with cane. States she spends most of the day in the bed. Sometimes goes downstairs to eat. Uses wheelchair as main source of ambulation. SUBJECTIVE  \"I can sleep for 72 hours straight\"      OBJECTIVE  Precautions: Spine  Fall Risk: Standard fall risk    WEIGHT BEARING RESTRICTION  Weight Bearing Restriction: None                PAIN ASSESSMENT  Ratin  Location: incisional back pain  Management Techniques: Activity promotion; Body mechanics;Repositioning    COGNITION  Overall Cognitive Status:  WFL - within functional limits    RANGE OF MOTION AND STRENGTH ASSESSMENT  Upper extremity ROM and strength are within functional limits     Lower extremity ROM is within functional limits     Lower extremity strength is within functional limits       BALANCE  Static Sitting: Good  Dynamic Sitting: Fair +  Static Standing: Fair  Dynamic Standing: Fair -    ADDITIONAL TESTS                                    ACTIVITY TOLERANCE                         O2 WALK       NEUROLOGICAL FINDINGS                        AM-PAC '6-Clicks' INPATIENT SHORT FORM - BASIC MOBILITY  How much difficulty does the patient currently have. .. Patient Difficulty: Turning over in bed (including adjusting bedclothes, sheets and blankets)?: None   Patient Difficulty: Sitting down on and standing up from a chair with arms (e.g., wheelchair, bedside commode, etc.): None   Patient Difficulty: Moving from lying on back to sitting on the side of the bed?: None   How much help from another person does the patient currently need. ..    Help from Another: Moving to and from a bed to a chair (including a wheelchair)?: None   Help from Another: Need to walk in hospital room?: A Little   Help from Another: Climbing 3-5 steps with a railing?: A Little       AM-PAC Score:  Raw Score: 22   Approx Degree of Impairment: 20.91%   Standardized Score (AM-PAC Scale): 53.28   CMS Modifier (G-Code): CJ    FUNCTIONAL ABILITY STATUS  Gait Assessment   Functional Mobility/Gait Assessment  Gait Assistance: Supervision  Distance (ft): 40  Assistive Device: Rolling walker  Pattern: Within Functional Limits    Skilled Therapy Provided     Bed Mobility:  Rolling: educated on use of log roll technique  Sidelying to sit: independent   Sit to sidelying: independent     Transfer Mobility:  Sit to stand: independent to RW    Stand to sit: independent  Gait = supervision w/ RW x 40 feet. Therapist's comments: Patient presents resting in bed. Discussed role and goal of physical therapy. Pt in agreement to session. Pt reporting 5/10 pain at this time. Educated on use of log roll technique for bed mobility. Educated on spine precautions: no bending, lifting, twisting. Pt verbalizes understanding. Bed mobility completed independently, able to demonstrate log roll technique with incr time. Sit to stand transfer independently to RW. Verbal cues required for hand placement. Pt ambulated 40 feet with RW. Pt deferred stairs today as she reports no concerns with them at home. Pt requesting return to bed at end of session as it is too painful for her to sit upright in a chair. Discussed importance of continued out of bed functional mobility. Encouraged pt to ambulate with RN/PCT staff 4 times daily per spine surgeons recommendations. Pt verbalizes understanding. Pt with no further IP PT needs at this time. If change in functional mobility status occurs please re-order therapy services. Recommendation to home at this time. RN and SW aware. Exercise/Education Provided:  Bed mobility  Body mechanics  Energy conservation  Functional activity tolerated  Gait training  Transfer training    Patient End of Session: In bed;Needs met;Call light within reach;RN aware of session/findings; All patient questions and concerns addressed; Discussed recommendations with /    Patient Evaluation Complexity Level:  History High - 3 or more personal factors and/or co-morbidities   Examination of body systems Low - addressing 1-2 elements   Clinical Presentation Low - Stable   Clinical Decision Making Low Complexity       PT Session Time: 25 minutes  Gait Training: 15 minutes  Therapeutic Activity:  minutes  Neuromuscular Re-education:  minutes  Therapeutic Exercise:  minutes

## 2023-10-16 ENCOUNTER — TELEPHONE (OUTPATIENT)
Dept: SURGERY | Facility: CLINIC | Age: 58
End: 2023-10-16

## 2023-10-16 ENCOUNTER — HOSPITAL ENCOUNTER (EMERGENCY)
Facility: HOSPITAL | Age: 58
Discharge: LEFT BEFORE DISCHARGE | End: 2023-10-16
Payer: MEDICARE

## 2023-10-16 VITALS
WEIGHT: 107 LBS | RESPIRATION RATE: 18 BRPM | SYSTOLIC BLOOD PRESSURE: 130 MMHG | HEIGHT: 63 IN | DIASTOLIC BLOOD PRESSURE: 84 MMHG | OXYGEN SATURATION: 98 % | TEMPERATURE: 98 F | BODY MASS INDEX: 18.96 KG/M2 | HEART RATE: 68 BPM

## 2023-10-16 NOTE — TELEPHONE ENCOUNTER
Pt's  calling to advise pt is still in a lot of pain post sx. Pt's pain level is currently 8/10. Pt is running a fever and has been unable to around since being discharged. Call transferred to [place].

## 2023-10-16 NOTE — TELEPHONE ENCOUNTER
Received pt reminder notice       Per pt reminder note:    \"Pt scheduled for surgery on 10.12.23 with Dr. Peña Must     Pt reminder placed to appear 3-5 days after sx \"        Routed to RN pool     right lumbar 4-5 microdiscectomy Right

## 2023-10-16 NOTE — TELEPHONE ENCOUNTER
Call transferred to nursing. Pt had  Surgery on 10/12/2023  right lumbar 4-5 microdiscectomy with Dr. Murphy Fabiola is in a lot of pain pt is taking the Norco and the Diazepam as prescribed. Pt  believes the patient might be running a slight fever. She's unable to get out of bed. She only gets out of bed to go to the bathroom. No loss of B/B, intense pain on the right hand side in her back when her butt, and travels down her leg to her thigh,   Pt denies any new weakness, tingling only when she walks. Pt states that the incision looks good, not red or hot. No oozzing or signs of infection. Pt and pt  denies any further signs and symptoms. Pt states her  cannot take care of the patient anymore and is inquiring for alternative care. Pt  states he is unsure of what to do for pt     Pt is having a bag of nuts and water for snacks. Pt reports not eating any meals claiming that the  is not providing appropriate meals for pt. Advised the pt to proceed to the ED if the pain is unbearable at home and the pt  is unable to take care of her. Pt and pt  acknowledged. Call was ended. Routed to the providers for review and to be informed.      AGNIESZKA Li informed

## 2023-10-16 NOTE — ED INITIAL ASSESSMENT (HPI)
PATIENT TO ER W/ COMPLAINTS OF BACK PAIN AND NAUSEA. HX LUMBAR RADICULOPATHY. HAD SPINAL SURGERY ON 10/12.

## 2023-10-16 NOTE — TELEPHONE ENCOUNTER
Noted that patient returned Phillip Koroam RN's outreach call. Called patient back. Patient underwent     right lumbar 4-5 microdiscectomy   On 10/12/23 with Dr. Lenin Locke. Post op day 4    HYDROcodone-acetaminophen  MG Oral Tab 40 tablet 0 10/13/2023     Sig - Route: Take 1-2 tablets by mouth every 6 (six) hours as needed. - Oral      diazePAM 10 MG Oral Tab 30 tablet 0 10/13/2023     Sig - Route: Take 1 tablet (10 mg total) by mouth every 8 (eight) hours as needed (muscle spasms). - Oral    Sent to pharmacy as: diazePAM 10 MG Oral Tablet (Valium)    E-Prescribing Status: Receipt confirmed by pharmacy (10/13/2023  1:23 PM CDT)        7) Confirm post-op appointments with patient:    2 week:   10/20/2023 11:30 AM Helena Davis., [address] [place] [address] [facility]     11/13/2023 1:00 PM 6 week:  Demond Nieves DO       Called patient to discuss her concerns about managing pain and reached voicemail. LMTCB.

## 2023-10-18 NOTE — TELEPHONE ENCOUNTER
PT's  calling to advise pt is still in a lot of pain. There went to ED and waited over 4 hours and were not seen and went home. He would like to speak to RN. Tried transferring call to RN station no answer. PT's  Billy is requesting a call back to [phone] asap.

## 2023-10-18 NOTE — TELEPHONE ENCOUNTER
The fevers are concerning to me.  If she continues to have fevers she should seek her PCP if she is not going to go to the ED to have a work up.      MKK

## 2023-10-18 NOTE — TELEPHONE ENCOUNTER
Noted the surgeons feedback listed below.     Spoke with Surgeon who states the pt is already on the max dose of valium for 10 mg q8h provider states he is not increasing her medications at this time.     Called the patient  to inquire further.   Spoke with Billy -  Inquired how often pt is taking her Diazepam Billy states he is not sure.   Phone transferred to pt. Pt states she is unsure but she thinks she takes it once or twice a day.     Informed the pt that she is on the maximum dose for valium for post op pain. Pt acknowledged. Informed pt that she may take the medication up to 3 times a day however, she needs to ensure there is 8 hours between each dose. Advised pt to leave a notepad by her bedside table to write down each time she takes the medication so she can remember when it is safe to take another dose. Pt acknowledged. Billy acknowledged.     Pt and pt  is appreciative.   Call was ended.           Called pt . Advised pt  to take pt to urgent care or PCP office if she is having consistent fevers to treat and ensure that there is not an infection. Pt  acknowledged.     Call was ended

## 2023-10-18 NOTE — TELEPHONE ENCOUNTER
Noted the message listed below.     Called pt  back to inquire further   Verbal release verified.   Spoke with Billy   Informed pt and pt  that the provider has recommended to increase her muscle relaxer dosage. ( In a separate TE)   Pt and pt  is in agreement to the surgeons recommendations.     Informed pt and Billy that the surgeon will be made aware of their request.     Billy confirmed pt F/U appt on 10/20/2023     Nursing acknolwedged.     Routed to the surgeon to inform of the pt request.

## 2023-10-20 ENCOUNTER — OFFICE VISIT (OUTPATIENT)
Dept: SURGERY | Facility: CLINIC | Age: 58
End: 2023-10-20
Payer: MEDICARE

## 2023-10-20 VITALS — SYSTOLIC BLOOD PRESSURE: 102 MMHG | DIASTOLIC BLOOD PRESSURE: 68 MMHG | HEART RATE: 91 BPM | TEMPERATURE: 99 F

## 2023-10-20 DIAGNOSIS — Z98.890 S/P LUMBAR MICRODISCECTOMY: Primary | ICD-10-CM

## 2023-10-20 PROCEDURE — 3078F DIAST BP <80 MM HG: CPT

## 2023-10-20 PROCEDURE — 3074F SYST BP LT 130 MM HG: CPT

## 2023-10-20 PROCEDURE — 99024 POSTOP FOLLOW-UP VISIT: CPT

## 2023-10-20 RX ORDER — HYDROCODONE BITARTRATE AND ACETAMINOPHEN 10; 325 MG/1; MG/1
1-2 TABLET ORAL EVERY 4 HOURS PRN
Qty: 40 TABLET | Refills: 0 | Status: SHIPPED | OUTPATIENT
Start: 2023-10-20

## 2023-10-20 RX ORDER — DIAZEPAM 10 MG/1
10 TABLET ORAL EVERY 8 HOURS PRN
Qty: 30 TABLET | Refills: 0 | Status: SHIPPED | OUTPATIENT
Start: 2023-10-20

## 2023-11-02 DIAGNOSIS — Z98.890 S/P LUMBAR MICRODISCECTOMY: ICD-10-CM

## 2023-11-03 NOTE — TELEPHONE ENCOUNTER
Medication:   HYDROcodone-acetaminophen  MG Oral Tab 40 tablet 0 10/20/2023    Sig:   Take 1-2 tablets by mouth every 4 (four) hours as needed.          Date of last refill: 10.20.23 (#40/0)  [place]      Last office visit: 10.20.23  Due back to clinic per last office note:    Date next office visit scheduled:  11.13.23  Future Appointments   Date Time Provider Department Center   11/13/2023  1:00 PM HAYLEY Goodman DO [facility]   12/6/2023  2:00 PM Hubert Norris MD [facility]           Last OV note recommendation:       \" PLAN:   Increased Norco frequency to Q4H PRN  Refills provided for Norco and valium as requested  Referral to [facility] placed  Continue to minimize bending, lifting, twisting  No lifting greater than 10-15 lbs  Reviewed incisional care and activity restrictions  F/u on 11/13 at 1:00 pm with Dr. Goodman  Instructed to go to the ED with any new or worsening symptoms, including signs of infection that warrant further evaluation.  \"              Medication:   diazePAM 10 MG Oral Tab 30 tablet 0 10/20/2023    Sig:   Take 1 tablet (10 mg total) by mouth every 8 (eight) hours as needed (muscle spasms).          Date of last refill: 10.20.23 (#30/0)  [place]      Last office visit: 10.20.23  Due back to clinic per last office note:    Date next office visit scheduled:  11.13.23  Future Appointments   Date Time Provider Department Center   11/13/2023  1:00 PM HAYLEY Goodman DO [facility]   12/6/2023  2:00 PM Hubert Norris MD [facility]           Last OV note recommendation:       \" PLAN:   Increased Norco frequency to Q4H PRN  Refills provided for Norco and valium as requested  Referral to [facility] placed  Continue to minimize bending, lifting, twisting  No lifting greater than 10-15 lbs  Reviewed incisional care and activity restrictions  F/u on 11/13 at 1:00 pm with   Rex  Instructed to go to the ED with any new or worsening symptoms, including signs of infection that warrant further evaluation.  \"

## 2023-11-13 ENCOUNTER — OFFICE VISIT (OUTPATIENT)
Dept: SURGERY | Facility: CLINIC | Age: 58
End: 2023-11-13
Payer: MEDICARE

## 2023-11-13 VITALS
WEIGHT: 107 LBS | DIASTOLIC BLOOD PRESSURE: 72 MMHG | HEART RATE: 93 BPM | SYSTOLIC BLOOD PRESSURE: 124 MMHG | BODY MASS INDEX: 19 KG/M2

## 2023-11-13 DIAGNOSIS — Z98.890 S/P LUMBAR MICRODISCECTOMY: Primary | ICD-10-CM

## 2023-11-13 RX ORDER — DIAZEPAM 10 MG/1
10 TABLET ORAL NIGHTLY PRN
Qty: 30 TABLET | Refills: 0 | Status: SHIPPED | OUTPATIENT
Start: 2023-11-13

## 2023-11-13 NOTE — PROGRESS NOTES
S: Patient is s/p NEHAL. She feels better than before. She has no new symptoms.   She feels her pain is improving and she is requiring less [place].    O: AVSS   Neuro: stable     A/P s/p NEHAL  -rx for PT  -rx for Valium 10 mg nightly  -Dr. Mariely Woo is managing her pain otherwise  -F/U in 2 months after PT

## 2023-11-13 NOTE — PROGRESS NOTES
Patient is here today for 4 week Post Op visit-- Sx:10/12/23--right lumbar 4-5 microdiscectomy    Pain Scale:  6/10 by incision in lower back     Treatments: Norco, Fentanyl patch with relief      Imaging:  no recent imaging

## 2023-11-14 RX ORDER — HYDROCODONE BITARTRATE AND ACETAMINOPHEN 10; 325 MG/1; MG/1
1-2 TABLET ORAL EVERY 4 HOURS PRN
Qty: 40 TABLET | Refills: 0 | OUTPATIENT
Start: 2023-11-14

## 2023-11-14 RX ORDER — DIAZEPAM 10 MG/1
10 TABLET ORAL EVERY 8 HOURS PRN
Qty: 30 TABLET | Refills: 0 | OUTPATIENT
Start: 2023-11-14

## 2023-11-21 ENCOUNTER — PATIENT MESSAGE (OUTPATIENT)
Dept: SURGERY | Facility: CLINIC | Age: 58
End: 2023-11-21

## 2023-11-21 NOTE — TELEPHONE ENCOUNTER
From: Baljeet Arzate  To: Rabiakhadijah Deutsch  Sent: 11/21/2023 5:17 PM CST  Subject: Diazepam    Mickey Gaffney Patient,    Would it be acceptable to take my Diazepam during the day, rather than at night? It keeps me up until early morning hours. I'm aware it can make me drowsy and will be careful with my activities. Thank you.     Jessica Guzman

## 2023-11-24 NOTE — TELEPHONE ENCOUNTER
Dr. Rei Gilman responded directly to pt advising it is ok for her to take diazepam during the day vs taking it at night.     Nothing further needed for this encounter

## 2023-12-06 ENCOUNTER — TELEMEDICINE (OUTPATIENT)
Dept: FAMILY MEDICINE CLINIC | Facility: CLINIC | Age: 58
End: 2023-12-06
Payer: MEDICARE

## 2023-12-06 DIAGNOSIS — G47.10 HYPERSOMNIA: ICD-10-CM

## 2023-12-06 DIAGNOSIS — G89.29 CHRONIC MIDLINE LOW BACK PAIN WITHOUT SCIATICA: ICD-10-CM

## 2023-12-06 DIAGNOSIS — G89.4 CHRONIC PAIN SYNDROME: Primary | ICD-10-CM

## 2023-12-06 DIAGNOSIS — M54.50 CHRONIC MIDLINE LOW BACK PAIN WITHOUT SCIATICA: ICD-10-CM

## 2023-12-06 RX ORDER — HYDROCODONE BITARTRATE AND ACETAMINOPHEN 10; 325 MG/1; MG/1
2 TABLET ORAL EVERY 6 HOURS PRN
Qty: 100 TABLET | Refills: 0 | Status: SHIPPED | OUTPATIENT
Start: 2023-12-14 | End: 2023-12-28

## 2023-12-06 RX ORDER — DEXTROAMPHETAMINE SACCHARATE, AMPHETAMINE ASPARTATE, DEXTROAMPHETAMINE SULFATE AND AMPHETAMINE SULFATE 5; 5; 5; 5 MG/1; MG/1; MG/1; MG/1
20 TABLET ORAL 2 TIMES DAILY
Qty: 60 TABLET | Refills: 0 | Status: SHIPPED | OUTPATIENT
Start: 2023-12-06 | End: 2024-01-05

## 2023-12-06 RX ORDER — DEXTROAMPHETAMINE SACCHARATE, AMPHETAMINE ASPARTATE, DEXTROAMPHETAMINE SULFATE AND AMPHETAMINE SULFATE 5; 5; 5; 5 MG/1; MG/1; MG/1; MG/1
20 TABLET ORAL 2 TIMES DAILY
Qty: 60 TABLET | Refills: 0 | Status: SHIPPED | OUTPATIENT
Start: 2024-01-05 | End: 2024-02-04

## 2023-12-06 RX ORDER — FENTANYL 25 UG/1
1 PATCH TRANSDERMAL
Qty: 10 PATCH | Refills: 0 | Status: SHIPPED | OUTPATIENT
Start: 2023-12-14 | End: 2024-01-13

## 2023-12-06 NOTE — PROGRESS NOTES
Dylan Yoon is a 59year old female coming in for had concerns including Blood Pressure (Follow up ). Subjective:   Blood Pressure     Post op follow up. PT started last week, second session yesterday, PT yesterday and wiped out, exhausted and in pain. [facility] PT. Overall pain is better, but PT exacrbating. Fentl already down from 50 but still at 25. Norco 4 to 6 a day (2 at a time)     ROS: GENERAL HEALTH: feels well otherwise  This visit is conducted using Telemedicine with live, interactive video and audio. Patient has been referred to the [facility] website at [email or web address] to review the yearly Consent to Treat document. Patient understands and accepts financial responsibility for any deductible, co-insurance and/or co-pays associated with this service. Cutting back slowly on meds. Slowly increasing activity, will follow. Objective: There were no vitals taken for this visit. There is no height or weight on file to calculate BMI. Physical Exam  Constitutional:       Appearance: She is well-developed. HENT:      Head: Normocephalic and atraumatic. Pulmonary:      Effort: Pulmonary effort is normal. No respiratory distress. Musculoskeletal:         General: Normal range of motion. Cervical back: Normal range of motion. Skin:     Findings: No rash. Neurological:      Mental Status: She is alert and oriented to person, place, and time. Psychiatric:         Mood and Affect: Mood normal.         Behavior: Behavior normal.         Thought Content: Thought content normal.         Judgment: Judgment normal.           Assessment & Plan:   1. Chronic pain syndrome (Primary)  Overview:  Due to chronic encephalitis, Off opiod since May 2017   Orders:  -     fentaNYL; Place 1 patch onto the skin every third day. Dispense: 10 patch; Refill: 0  -     HYDROcodone-Acetaminophen; Take 2 tablets by mouth every 6 (six) hours as needed for Pain. Dispense: 100 tablet; Refill: 0  2.  Chronic midline low back pain without sciatica  -     fentaNYL; Place 1 patch onto the skin every third day. Dispense: 10 patch; Refill: 0  -     HYDROcodone-Acetaminophen; Take 2 tablets by mouth every 6 (six) hours as needed for Pain. Dispense: 100 tablet; Refill: 0  3. Hypersomnia  Overview:  adderall 20 BID   Orders:  -     Amphetamine-Dextroamphetamine; Take 1 tablet (20 mg total) by mouth 2 (two) times daily. Dispense: 60 tablet; Refill: 0  -     Amphetamine-Dextroamphetamine; Take 1 tablet (20 mg total) by mouth 2 (two) times daily. Dispense: 60 tablet; Refill: 0     I am having Chris Benton maintain her levothyroxine, traZODone, venlafaxine ER, ondansetron, melatonin, NON FORMULARY, amphetamine-dextroamphetamine, atorvastatin, diazePAM, amphetamine-dextroamphetamine, amphetamine-dextroamphetamine, fentaNYL, and HYDROcodone-acetaminophen. Return in about 3 weeks (around 12/27/2023) for recheck, Video Visit.     Kris Anand MD, 12/6/2023, 2:03 PM

## 2023-12-22 ENCOUNTER — TELEPHONE (OUTPATIENT)
Dept: SURGERY | Facility: CLINIC | Age: 58
End: 2023-12-22

## 2023-12-22 NOTE — TELEPHONE ENCOUNTER
Progress Note from [facility] Physical Therapy DOS 12/2/2023 received by MA clinical staff, endorsed to provider for review. Placed on [employer] desk for signature    Will be sent to scanning once received back from provider.

## 2023-12-22 NOTE — TELEPHONE ENCOUNTER
Rcvd progress note from [facility] Physical Therapy DOS 10/12/23; Placed in nurse bin for review and endorsement.

## 2023-12-28 ENCOUNTER — TELEPHONE (OUTPATIENT)
Dept: SURGERY | Facility: CLINIC | Age: 58
End: 2023-12-28

## 2023-12-28 PROBLEM — E46 PROTEIN-CALORIE MALNUTRITION, UNSPECIFIED SEVERITY (HCC): Status: ACTIVE | Noted: 2023-12-28

## 2023-12-28 NOTE — TELEPHONE ENCOUNTER
Rcvd progress note from [facility] Physical Therapy DOS 12/21/23; Placed in nurse bin for review and endorsement.

## 2023-12-28 NOTE — TELEPHONE ENCOUNTER
Progress Note-12/21/23 from [facility] Physical Therapy received by MA clinical staff, endorsed to provider for review/signature. Placed on HAYLEY Fagan's desk.

## 2023-12-29 ENCOUNTER — PATIENT MESSAGE (OUTPATIENT)
Dept: SURGERY | Facility: CLINIC | Age: 58
End: 2023-12-29

## 2023-12-29 DIAGNOSIS — Z98.890 S/P LUMBAR MICRODISCECTOMY: Primary | ICD-10-CM

## 2024-01-02 NOTE — TELEPHONE ENCOUNTER
Noted the patient message listed below.     Pt is requesting to decrease her PT to once a week due to extreme fatigue  From her brain disease.     Pt is requesting to extend her PT     Pt discontinued her diazepam due to depression.     Follow up appointment is 2/22/2024.    Noted pt LOV on 11/13/23 with Dr. Goodman:   \"S: Patient is s/p NEHAL.  She feels better than before.  She has no new symptoms.  She feels her pain is improving and she is requiring less [place].     O: AVSS   Neuro: stable     A/P s/p NEHAL  -rx for PT  -rx for Valium 10 mg nightly  -Dr. Norris is managing her pain otherwise  -F/U in 2 months after PT\"       Routed to the providers for review and feedback

## 2024-01-02 NOTE — TELEPHONE ENCOUNTER
From: Mini Dorsey  To: Viri Goodman  Sent: 12/29/2023 5:52 PM CST  Subject: Physical Therapy    Hi Dr. Goodman,  I need to reduce my time from twice a week to once a week for Physical therapy. In addition to the pain, it's too exhausting. I went from being bedridden to leaving the house twice a week. I cannot keep up with this schedule. My brain disease causes extreme fatigue.    I believe [facility] is going to contact you regarding extending my physical therapy. I attended six of the eight sessions. Also, I discontinued the Diazepam. It was causing me depression.     My last medical appointment was telemed with my coordinating doctor, Hubert Norris MD. I'm scheduled with Marisol on February 9.  Thank you kindly,  Mini Norman

## 2024-01-30 ENCOUNTER — TELEPHONE (OUTPATIENT)
Dept: SURGERY | Facility: CLINIC | Age: 59
End: 2024-01-30

## 2024-01-30 NOTE — TELEPHONE ENCOUNTER
Received AT physical therapy-progress note DOS 01/29/24 for provider to review and sign.     Placed in neuro surgery bin for clinical staff.

## 2024-02-01 NOTE — TELEPHONE ENCOUNTER
[facility] physical therapy-progress note DOS 01/29/24 received by MA clinical staff, endorsed to provider for review.     Placed on Marisol Fagan's desk for signature    Will be sent to scanning once received back from provider.

## 2024-02-08 NOTE — ASSESSMENT & PLAN NOTE
Clinical Course: stable   Good control  Antidepressant Meds: traZODone Tabs - 100 MG; venlafaxine ER Cp24 - 150 MG

## 2024-02-08 NOTE — ASSESSMENT & PLAN NOTE
Thyroid shows Good control.   TSH: 1.34, done on 9/14/2023.  FT4: 1.3, done on 7/15/2020.  FT3: 2.58, done on 7/15/2020.   Thyroid therapy includes levothyroxine 100 MCG Oral Tab [[id number]], levothyroxine 100 MCG Oral Tab [[id number]] (Long-Term Med).

## 2024-02-08 NOTE — ASSESSMENT & PLAN NOTE
There is no height or weight on file to calculate BMI.  BMI Readings from Last 15 Encounters:   11/13/23 18.95 kg/m²   10/12/23 19.75 kg/m²   08/25/23 19.13 kg/m²   07/11/23 19.13 kg/m²   06/07/23 19.49 kg/m²   12/22/21 19.20 kg/m²   08/06/21 17.92 kg/m²   08/03/21 17.74 kg/m²   07/07/21 20.67 kg/m²   10/22/20 18.66 kg/m²   07/24/20 20.67 kg/m²   03/02/20 20.30 kg/m²   02/27/20 21.22 kg/m²   01/26/20 21.25 kg/m²   12/02/19 21.03 kg/m²     Etiology: Multifactorial  Clinical Findings: Temporal wasting  Treatment Plan: Dietician/Nutrition consult

## 2024-02-08 NOTE — ASSESSMENT & PLAN NOTE
ADHD shows Good control.  Weight trend: has been stable  Anxiolytic Meds: amphetamine-dextroamphetamine Tabs - 20 MG

## 2024-02-09 ENCOUNTER — TELEMEDICINE (OUTPATIENT)
Dept: FAMILY MEDICINE CLINIC | Facility: CLINIC | Age: 59
End: 2024-02-09
Payer: MEDICARE

## 2024-02-09 DIAGNOSIS — E06.3 CHRONIC LYMPHOCYTIC THYROIDITIS: Chronic | ICD-10-CM

## 2024-02-09 DIAGNOSIS — E46 PROTEIN-CALORIE MALNUTRITION, UNSPECIFIED SEVERITY (HCC): ICD-10-CM

## 2024-02-09 DIAGNOSIS — G04.81: ICD-10-CM

## 2024-02-09 DIAGNOSIS — D80.1 HYPOGAMMAGLOBULINEMIA, ACQUIRED (HCC): ICD-10-CM

## 2024-02-09 DIAGNOSIS — F90.0 ADHD (ATTENTION DEFICIT HYPERACTIVITY DISORDER), INATTENTIVE TYPE: ICD-10-CM

## 2024-02-09 DIAGNOSIS — J41.0 SIMPLE CHRONIC BRONCHITIS (HCC): Chronic | ICD-10-CM

## 2024-02-09 DIAGNOSIS — G89.29 CHRONIC MIDLINE LOW BACK PAIN WITHOUT SCIATICA: ICD-10-CM

## 2024-02-09 DIAGNOSIS — G47.10 HYPERSOMNIA: ICD-10-CM

## 2024-02-09 DIAGNOSIS — M54.50 CHRONIC MIDLINE LOW BACK PAIN WITHOUT SCIATICA: ICD-10-CM

## 2024-02-09 DIAGNOSIS — Z98.890 S/P LUMBAR MICRODISCECTOMY: ICD-10-CM

## 2024-02-09 DIAGNOSIS — G89.4 CHRONIC PAIN SYNDROME: Primary | ICD-10-CM

## 2024-02-09 DIAGNOSIS — F33.0 MILD RECURRENT MAJOR DEPRESSION (HCC): ICD-10-CM

## 2024-02-09 DIAGNOSIS — M46.96 INFLAMMATORY SPONDYLOPATHY OF LUMBAR REGION (HCC): ICD-10-CM

## 2024-02-09 RX ORDER — HYDROCODONE BITARTRATE AND ACETAMINOPHEN 10; 325 MG/1; MG/1
2 TABLET ORAL EVERY 6 HOURS PRN
Qty: 100 TABLET | Refills: 0 | Status: SHIPPED | OUTPATIENT
Start: 2024-03-29 | End: 2024-04-12

## 2024-02-09 RX ORDER — FENTANYL 25 UG/1
1 PATCH TRANSDERMAL
Qty: 10 PATCH | Refills: 0 | Status: SHIPPED | OUTPATIENT
Start: 2024-03-10 | End: 2024-04-09

## 2024-02-09 RX ORDER — DEXTROAMPHETAMINE SACCHARATE, AMPHETAMINE ASPARTATE, DEXTROAMPHETAMINE SULFATE AND AMPHETAMINE SULFATE 5; 5; 5; 5 MG/1; MG/1; MG/1; MG/1
20 TABLET ORAL 2 TIMES DAILY
Qty: 60 TABLET | Refills: 0 | Status: SHIPPED | OUTPATIENT
Start: 2024-04-09 | End: 2024-05-09

## 2024-02-09 RX ORDER — HYDROCODONE BITARTRATE AND ACETAMINOPHEN 10; 325 MG/1; MG/1
2 TABLET ORAL EVERY 6 HOURS PRN
Qty: 100 TABLET | Refills: 0 | Status: SHIPPED | OUTPATIENT
Start: 2024-02-16 | End: 2024-03-01

## 2024-02-09 RX ORDER — HYDROCODONE BITARTRATE AND ACETAMINOPHEN 10; 325 MG/1; MG/1
2 TABLET ORAL EVERY 6 HOURS PRN
Qty: 100 TABLET | Refills: 0 | Status: SHIPPED | OUTPATIENT
Start: 2024-03-15 | End: 2024-03-29

## 2024-02-09 RX ORDER — DEXTROAMPHETAMINE SACCHARATE, AMPHETAMINE ASPARTATE, DEXTROAMPHETAMINE SULFATE AND AMPHETAMINE SULFATE 5; 5; 5; 5 MG/1; MG/1; MG/1; MG/1
20 TABLET ORAL 2 TIMES DAILY
Qty: 60 TABLET | Refills: 0 | Status: SHIPPED | OUTPATIENT
Start: 2024-02-09 | End: 2024-03-10

## 2024-02-09 RX ORDER — FENTANYL 25 UG/1
1 PATCH TRANSDERMAL
Qty: 10 PATCH | Refills: 0 | Status: SHIPPED | OUTPATIENT
Start: 2024-04-09 | End: 2024-05-09

## 2024-02-09 RX ORDER — DEXTROAMPHETAMINE SACCHARATE, AMPHETAMINE ASPARTATE, DEXTROAMPHETAMINE SULFATE AND AMPHETAMINE SULFATE 5; 5; 5; 5 MG/1; MG/1; MG/1; MG/1
20 TABLET ORAL 2 TIMES DAILY
Qty: 60 TABLET | Refills: 0 | Status: SHIPPED | OUTPATIENT
Start: 2024-03-10 | End: 2024-04-09

## 2024-02-09 RX ORDER — FENTANYL 25 UG/1
1 PATCH TRANSDERMAL
Qty: 10 PATCH | Refills: 0 | Status: SHIPPED | OUTPATIENT
Start: 2024-02-09 | End: 2024-03-10

## 2024-02-09 RX ORDER — HYDROCODONE BITARTRATE AND ACETAMINOPHEN 10; 325 MG/1; MG/1
2 TABLET ORAL EVERY 6 HOURS PRN
Qty: 100 TABLET | Refills: 0 | Status: SHIPPED | OUTPATIENT
Start: 2024-03-01 | End: 2024-03-15

## 2024-02-09 RX ORDER — HYDROCODONE BITARTRATE AND ACETAMINOPHEN 10; 325 MG/1; MG/1
2 TABLET ORAL EVERY 6 HOURS PRN
Qty: 100 TABLET | Refills: 0 | Status: SHIPPED | OUTPATIENT
Start: 2024-04-12 | End: 2024-04-26

## 2024-02-09 NOTE — PROGRESS NOTES
Mini Norman is a 58 year old female coming in for had no chief complaint listed for this encounter.    Subjective:   HPI doing well. PT says stronger in all categories, down to once a week PT, , finally recovering quickyl. Now more out of house time, more staing > 5 minutes       ROS: GENERAL HEALTH: feels well otherwise  This visit is conducted using Telemedicine with live, interactive video and audio.    Patient has been referred to the [facility] website at [email or web address] to review the yearly Consent to Treat document.    Patient understands and accepts financial responsibility for any deductible, co-insurance and/or co-pays associated with this service.    Pain down form 9/10 to 6/10 overall, better but not gone.  Agreement updated       Objective:   There were no vitals taken for this visit. There is no height or weight on file to calculate BMI.   Physical Exam  Constitutional:       Appearance: She is well-developed.   HENT:      Head: Normocephalic and atraumatic.   Pulmonary:      Effort: Pulmonary effort is normal. No respiratory distress.   Musculoskeletal:         General: Normal range of motion.      Cervical back: Normal range of motion.   Skin:     Findings: No rash.   Neurological:      Mental Status: She is alert and oriented to person, place, and time.   Psychiatric:         Mood and Affect: Mood normal.         Behavior: Behavior normal.         Thought Content: Thought content normal.         Judgment: Judgment normal.           Assessment & Plan:   1. Chronic pain syndrome (Primary)  Overview:  Due to chronic encephalitis, Off opiod since May 2017   Assessment & Plan:  Stable on meds. Continue present management   Orders:  -     fentaNYL; Place 1 patch onto the skin every third day.  Dispense: 10 patch; Refill: 0  -     HYDROcodone-Acetaminophen; Take 2 tablets by mouth every 6 (six) hours as needed for Pain.  Dispense: 100 tablet; Refill: 0  -     HYDROcodone-Acetaminophen; Take 2 tablets by  mouth every 6 (six) hours as needed for Pain.  Dispense: 100 tablet; Refill: 0  -     fentaNYL; Place 1 patch onto the skin every third day.  Dispense: 10 patch; Refill: 0  -     fentaNYL; Place 1 patch onto the skin every third day.  Dispense: 10 patch; Refill: 0  -     HYDROcodone-Acetaminophen; Take 2 tablets by mouth every 6 (six) hours as needed for Pain.  Dispense: 100 tablet; Refill: 0  -     HYDROcodone-Acetaminophen; Take 2 tablets by mouth every 6 (six) hours as needed for Pain.  Dispense: 100 tablet; Refill: 0  2. Chronic midline low back pain without sciatica  Assessment & Plan:  Stable, continue present management   Orders:  -     fentaNYL; Place 1 patch onto the skin every third day.  Dispense: 10 patch; Refill: 0  -     HYDROcodone-Acetaminophen; Take 2 tablets by mouth every 6 (six) hours as needed for Pain.  Dispense: 100 tablet; Refill: 0  -     HYDROcodone-Acetaminophen; Take 2 tablets by mouth every 6 (six) hours as needed for Pain.  Dispense: 100 tablet; Refill: 0  -     fentaNYL; Place 1 patch onto the skin every third day.  Dispense: 10 patch; Refill: 0  -     fentaNYL; Place 1 patch onto the skin every third day.  Dispense: 10 patch; Refill: 0  -     HYDROcodone-Acetaminophen; Take 2 tablets by mouth every 6 (six) hours as needed for Pain.  Dispense: 100 tablet; Refill: 0  -     HYDROcodone-Acetaminophen; Take 2 tablets by mouth every 6 (six) hours as needed for Pain.  Dispense: 100 tablet; Refill: 0  3. ADHD (attention deficit hyperactivity disorder), inattentive type  Overview:  aadderall 20 BID, in remission for sx  Assessment & Plan:  ADHD shows Good control.  Weight trend: has been stable  Anxiolytic Meds: amphetamine-dextroamphetamine Tabs - 20 MG   4. Mild recurrent major depression (HCC)  Overview:  Venlafaxine  daily, trazodone 1  Assessment & Plan:  Clinical Course: stable   Good control  Antidepressant Meds: traZODone Tabs - 100 MG; venlafaxine ER Cp24 - 150 MG   5. 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 Protein-calorie malnutrition, unspecified severity (HCC)  Assessment & Plan:  There is no height or weight on file to calculate BMI.  BMI Readings from Last 15 Encounters:   11/13/23 18.95 kg/m²   10/12/23 19.75 kg/m²   08/25/23 19.13 kg/m²   07/11/23 19.13 kg/m²   06/07/23 19.49 kg/m²   12/22/21 19.20 kg/m²   08/06/21 17.92 kg/m²   08/03/21 17.74 kg/m²   07/07/21 20.67 kg/m²   10/22/20 18.66 kg/m²   07/24/20 20.67 kg/m²   03/02/20 20.30 kg/m²   02/27/20 21.22 kg/m²   01/26/20 21.25 kg/m²   12/02/19 21.03 kg/m²     Etiology: Multifactorial  Clinical Findings: Temporal wasting  Treatment Plan: Dietician/Nutrition consult   7. Hypogammaglobulinemia, acquired (HCC)  Overview:  Lab test 2016  Assessment & Plan:  Stable, continue present management per neurology.   8. Simple chronic bronchitis (HCC)  Overview:  flare in 2020 tx with Breo    Assessment & Plan:  Stable, continue present management   9. Inflammatory spondylopathy of lumbar region (HCC)  Assessment & Plan:  Stable, continue present management   10. Chronic lymphocytic thyroiditis  Overview:  Dx at [place] in about 2005  Assessment & Plan:  Thyroid shows Good control.   TSH: 1.34, done on 9/14/2023.  FT4: 1.3, done on 7/15/2020.  FT3: 2.58, done on 7/15/2020.   Thyroid therapy includes levothyroxine 100 MCG Oral Tab [[id number]], levothyroxine 100 MCG Oral Tab [[id number]] (Long-Term Med).   11. S/P lumbar microdiscectomy  -     HYDROcodone-Acetaminophen; Take 2 tablets by mouth every 6 (six) hours as needed.  Dispense: 100 tablet; Refill: 0  12. Hypersomnia  Overview:  adderall 20 BID   Orders:  -     Amphetamine-Dextroamphetamine; Take 1 tablet (20 mg total) by mouth 2 (two) times daily.  Dispense: 60 tablet; Refill: 0  -      Amphetamine-Dextroamphetamine; Take 1 tablet (20 mg total) by mouth 2 (two) times daily.  Dispense: 60 tablet; Refill: 0  -     Amphetamine-Dextroamphetamine; Take 1 tablet (20 mg total) by mouth 2 (two) times daily.  Dispense: 60 tablet; Refill: 0    Excellent improvement but still in a lot of pain.  Continue the pain level but will work on decreasing as she gets more active.  Arrange for an office follow-up in about 2 and half months and we will do Medicare supervision at that time.    I am having Mini Dorsey maintain her levothyroxine, traZODone, venlafaxine ER, ondansetron, melatonin, NON FORMULARY, amphetamine-dextroamphetamine, atorvastatin, fentaNYL, HYDROcodone-acetaminophen, HYDROcodone-acetaminophen, HYDROcodone-acetaminophen, amphetamine-dextroamphetamine, amphetamine-dextroamphetamine, fentaNYL, fentaNYL, amphetamine-dextroamphetamine, HYDROcodone-acetaminophen, and HYDROcodone-acetaminophen.     Return in about 2 months (around 4/22/2024) for AHA (Annual Health Assessment) visit (Supervisit).    Hubert Norris MD, 2/9/2024, 2:41 PM

## 2024-02-20 ENCOUNTER — TELEPHONE (OUTPATIENT)
Dept: SURGERY | Facility: CLINIC | Age: 59
End: 2024-02-20

## 2024-02-20 ENCOUNTER — PATIENT MESSAGE (OUTPATIENT)
Dept: SURGERY | Facility: CLINIC | Age: 59
End: 2024-02-20

## 2024-02-20 DIAGNOSIS — Z98.890 S/P LUMBAR MICRODISCECTOMY: Primary | ICD-10-CM

## 2024-02-20 NOTE — TELEPHONE ENCOUNTER
LVM that pt's appt on 2/22/2024 at 2:30 with Marisol was cx due to provider unavailable. Pt to c/b to r/s.

## 2024-02-21 NOTE — TELEPHONE ENCOUNTER
Received notification of new PT orders. Orders printed and faxed to [facility] to previously used fax number from 1.30.24 TE:    [phone]     Notified patient via [employer] message.

## 2024-02-21 NOTE — TELEPHONE ENCOUNTER
From: Mini Dorsey  To: Marisol Fagan  Sent: 2/20/2024 9:07 PM CST  Subject: Visit Cancellation    Amrit Damon,  I received your message about our cancellation for this Thursday's appointment. I rescheduled for the following Thursday.     I wanted to note that I have only one physical therapy authorization left. I will run out before our next appointment. I will check with [facility] to ensure they have sent the request for authorization for more rehab appointments, in order to facilitate the continuation of physical therapy.    Kindly,  Mini Dorsey

## 2024-02-21 NOTE — TELEPHONE ENCOUNTER
Noted patient's message about rescheduling appointment with AGNIESZKA Ferrari.     [facility] Future Appointments    Encounter Information   Provider Department [facility]   2/29/2024 2:00 PM Marisol Fagan APRN         Patient is also asking about a new PT order, as her current PT order covers only one more session that she will have prior to next office visit.     Per Dr. Goodman at appointment on 11.13.23:    \"Sx:10/12/23--right lumbar 4-5 microdiscectomy     S: Patient is s/p NEHAL.  She feels better than before.  She has no new symptoms.  She feels her pain is improving and she is requiring less [place].     O: AVSS   Neuro: stable     A/P s/p NEHAL  -rx for PT  -rx for Valium 10 mg nightly  -Dr. Norris is managing her pain otherwise  -F/U in 2 months after PT\"    Routed to AGNIESZKA Marquez.

## 2024-02-29 ENCOUNTER — OFFICE VISIT (OUTPATIENT)
Dept: SURGERY | Facility: CLINIC | Age: 59
End: 2024-02-29
Payer: MEDICARE

## 2024-02-29 VITALS — HEART RATE: 90 BPM | SYSTOLIC BLOOD PRESSURE: 110 MMHG | DIASTOLIC BLOOD PRESSURE: 72 MMHG

## 2024-02-29 DIAGNOSIS — Z98.890 S/P LUMBAR MICRODISCECTOMY: Primary | ICD-10-CM

## 2024-02-29 PROCEDURE — 99024 POSTOP FOLLOW-UP VISIT: CPT

## 2024-02-29 NOTE — PROGRESS NOTES
Established patient:  Reason for follow up: post op   right lumbar 4-5 microdiscectomy Right     Numeric Rating Scale: (No Pain) 0  to  10 (Worst Pain)        Pain at Present:  6/10       Distribution of Pain:    right    Most recent treatments for Current Pain Condition:   Physical Therapy and Surgery  Response to treatment: complete resolution    New imaging or testing since your last office visit:

## 2024-02-29 NOTE — PATIENT INSTRUCTIONS
Refill policies:    Allow 2-3 business days for refills; controlled substances may take longer.  Contact your pharmacy at least 5 days prior to running out of medication and have them send an electronic request or submit request through the “request refill” option in your [facility] account.  Refills are not addressed on weekends; covering physicians do not authorize routine medications on weekends.  No narcotics or controlled substances are refilled after noon on Fridays or by on call physicians.  By law, narcotics must be electronically prescribed.  A 30 day supply with no refills is the maximum allowed.  If your prescription is due for a refill, you may be due for a follow up appointment.  To best provide you care, patients receiving routine medications need to be seen at least once a year.  Patients receiving narcotic/controlled substance medications need to be seen at least once every 3 months.  In the event that your preferred pharmacy does not have the requested medication in stock (e.g. Backordered), it is your responsibility to find another pharmacy that has the requested medication available.  We will gladly send a new prescription to that pharmacy at your request.    Scheduling Tests:    If your physician has ordered radiology tests such as MRI or CT scans, please contact Central Scheduling at [phone] right away to schedule the test.  Once scheduled, the [facility] Centralized Referral Team will work with your insurance carrier to obtain pre-certification or prior authorization.  Depending on your insurance carrier, approval may take 3-10 days.  It is highly recommended patients assure they have received an authorization before having a test performed.  If test is done without insurance authorization, patient may be responsible for the entire amount billed.      Precertification and Prior Authorizations:  If your physician has recommended that you have a procedure or additional testing performed the [facility]  Centralized Referral Team will contact your insurance carrier to obtain pre-certification or prior authorization.    You are strongly encouraged to contact your insurance carrier to verify that your procedure/test has been approved and is a COVERED benefit.  Although the [facility] Centralized Referral Team does its due diligence, the insurance carrier gives the disclaimer that \"Although the procedure is authorized, this does not guarantee payment.\"    Ultimately the patient is responsible for payment.   Thank you for your understanding in this matter.  Paperwork Completion:  If you require FMLA or disability paperwork for your recovery, please make sure to either drop it off or have it faxed to our office at [phone]. Be sure the form has your name and date of birth on it.  The form will be faxed to our Forms Department and they will complete it for you.  There is a 25$ fee for all forms that need to be filled out.  Please be aware there is a 10-14 day turnaround time.  You will need to sign a release of information (RUBA) form if your paperwork does not come with one.  You may call the Forms Department with any questions at [phone].  Their fax number is [phone].

## 2024-02-29 NOTE — PROGRESS NOTES
[facility]  Neurosurgery Clinic Visit: Post-Op Follow Up  24     Mini Dorsey Patient Status:  No patient class for patient encounter    10/5/1965 MRN [id number]   Location [facility], [facility] Attending No att. providers found   Hosp Day # 0 PCP Hubert Norris MD     REASON FOR VISIT: 3 month post-op follow up    SUBJECTIVE     Mini Dorsey is a pleasant 58 year old female here for follow up s/p right L4-5 microdiscectomy with Dr. Goodman on 10/12/23. She is accompanied by her . She has been doing well since her last visit. Has been in PT, however has had to take a break for the last 2 weeks due to a flare-up in her limbic encephalitis. She is eager to return to PT. She denies any new pain, paresthesias or weakness. Denies any incisional issues.     MEDICATIONS      fentaNYL 25 MCG/HR Transdermal Patch 72 Hr Place 1 patch onto the skin every third day. 10 patch 0    HYDROcodone-acetaminophen  MG Oral Tab Take 2 tablets by mouth every 6 (six) hours as needed for Pain. 100 tablet 0    [START ON 3/1/2024] HYDROcodone-acetaminophen  MG Oral Tab Take 2 tablets by mouth every 6 (six) hours as needed. 100 tablet 0    [START ON 3/15/2024] HYDROcodone-acetaminophen  MG Oral Tab Take 2 tablets by mouth every 6 (six) hours as needed for Pain. 100 tablet 0    [START ON 3/10/2024] amphetamine-dextroamphetamine (ADDERALL) 20 MG Oral Tab Take 1 tablet (20 mg total) by mouth 2 (two) times daily. 60 tablet 0    amphetamine-dextroamphetamine (ADDERALL) 20 MG Oral Tab Take 1 tablet (20 mg total) by mouth 2 (two) times daily. 60 tablet 0    [START ON 3/10/2024] fentaNYL 25 MCG/HR Transdermal Patch 72 Hr Place 1 patch onto the skin every third day. 10 patch 0    [START ON 2024] fentaNYL 25 MCG/HR Transdermal Patch 72 Hr Place 1 patch onto the skin every third day. 10 patch 0    [START ON 2024] amphetamine-dextroamphetamine (ADDERALL) 20 MG Oral Tab  Take 1 tablet (20 mg total) by mouth 2 (two) times daily. 60 tablet 0    [START ON 3/29/2024] HYDROcodone-acetaminophen  MG Oral Tab Take 2 tablets by mouth every 6 (six) hours as needed for Pain. 100 tablet 0    [START ON 4/12/2024] HYDROcodone-acetaminophen  MG Oral Tab Take 2 tablets by mouth every 6 (six) hours as needed for Pain. 100 tablet 0    amphetamine-dextroamphetamine 20 MG Oral Tab Take 1 tablet (20 mg total) by mouth 2 (two) times daily.      atorvastatin 10 MG Oral Tab Take 1 tablet (10 mg total) by mouth nightly.      melatonin 1 MG Oral Tab Take 1 tablet (1 mg total) by mouth nightly as needed.      NON FORMULARY Take 1 tablet by mouth nightly as needed. [place]ylands restful legs      ONDANSETRON 4 MG Oral Tablet Dispersible DISSOLVE 1 TABLET(4 MG) ON THE TONGUE EVERY 8 HOURS AS NEEDED FOR NAUSEA 60 tablet 3    VENLAFAXINE  MG Oral Capsule SR 24 Hr TAKE 2 CAPSULES BY MOUTH EVERY  capsule 3    levothyroxine 100 MCG Oral Tab Take 1 tablet (100 mcg total) by mouth before breakfast. 90 tablet 3    traZODone 100 MG Oral Tab Take 1-1.5 tablets (100-150 mg total) by mouth nightly as needed for Sleep. 135 tablet 3       REVIEW OF SYSTEMS     Denies fever, chills, shortness of breath, chest pain, or calf pain.     PHYSICAL EXAMINATION     VITALS: /72 (BP Location: Right arm, Patient Position: Sitting, Cuff Size: large)   Pulse 90     GENERAL:  No acute distress, non-toxic appearing    HEENT:  Normocephalic, atraumatic    SKIN: Warm, dry. Surgical incision well healed    NEUROLOGICAL: Alert and oriented x 3. Speech fluent. Comprehension intact. Face is symmetrical. Gait intact, non-antalgic. Ambulates with cane. Sensation to light touch is intact bilaterally. DTRs 2/4 bilaterally.      LOWER EXTREMITY STRENGTH:    Iliospoas  Hamstrings  Quads  D-flexion  P-flexion EHL     Right 5 5 5 5 5 5     Left 5 5 5 5 5 5       IMAGING     No new imaging to review    ASSESSMENT AND PLAN      ASSESSMENT:   1. S/P lumbar microdiscectomy       PLAN:   Continue PT   Recovering well  F/U in 3 months with Dr. Rex Fagan, MSN, APRN, FNP-[facility]  Neurosurgery   [address] [place] [address], [address]  [place] [id number] [phone]

## 2024-03-17 DIAGNOSIS — E06.3 HASHIMOTO'S THYROIDITIS: ICD-10-CM

## 2024-03-17 DIAGNOSIS — G04.81: Chronic | ICD-10-CM

## 2024-03-19 RX ORDER — LEVOTHYROXINE SODIUM 0.1 MG/1
100 TABLET ORAL
Qty: 90 TABLET | Refills: 1 | Status: SHIPPED | OUTPATIENT
Start: 2024-03-19

## 2024-03-19 NOTE — TELEPHONE ENCOUNTER
Requested Prescriptions     Pending Prescriptions Disp Refills    LEVOTHYROXINE 100 MCG Oral Tab [Pharmacy Med Name: LEVOTHYROXINE 0.100MG (100MCG) TAB] 90 tablet 3     Sig: TAKE 1 TABLET(100 MCG) BY MOUTH DAILY BEFORE BREAKFAST     LOV 9/14/2023     Patient was asked to follow-up in: 2 months    Appointment scheduled: Visit date not found     Medication refilled per protocol.

## 2024-06-04 NOTE — TELEPHONE ENCOUNTER
Patient: Sarika Rivero  : 1943    Encounter Date: 2024    Subjective  Patient ID: Sarika Rivero is a 80 y.o. female who is long term resident being seen and evaluated for multiple medical problems.    Alert and pleasant. Denies pain and shows no respiratory distress. She ambulates short distances with walker and particpates in PT. Denies recent falls. She is compliant with medication.         Review of Systems   Constitutional:  Negative for fever and unexpected weight change.   HENT:  Negative for sore throat.    Respiratory:  Negative for cough and shortness of breath.    Cardiovascular:  Negative for chest pain and palpitations.   Gastrointestinal:  Negative for abdominal pain, constipation, diarrhea, nausea and vomiting.   Genitourinary:  Negative for difficulty urinating and frequency.   Musculoskeletal:  Positive for arthralgias. Negative for back pain.   Skin:  Negative for rash.   Neurological:  Negative for dizziness, seizures and headaches.   Psychiatric/Behavioral:  Negative for agitation. The patient is not nervous/anxious.        Objective  There were no vitals taken for this visit.    Physical Exam  Vitals reviewed.   Constitutional:       General: She is not in acute distress.  HENT:      Mouth/Throat:      Mouth: Mucous membranes are moist.   Cardiovascular:      Rate and Rhythm: Regular rhythm.      Heart sounds: Normal heart sounds.   Pulmonary:      Breath sounds: Normal breath sounds. No rales.   Abdominal:      Palpations: Abdomen is soft.      Tenderness: There is no abdominal tenderness.   Musculoskeletal:         General: No tenderness.      Cervical back: Neck supple. No tenderness.   Skin:     General: Skin is warm.   Neurological:      General: No focal deficit present.      Mental Status: She is alert.         Assessment/Plan  Problem List Items Addressed This Visit       Esophageal reflux    Alzheimer's disease with late onset (Multi)    Osteoarthritis, generalized  Prudence Dyer has a MAW visit 9/15/2021. However she needs a f/u appointment before 9/9/2021, when her current medication refills runs out.  Norma Murdock wants her to come in sooner than 9/15/2021 and before 9/9/2021      Please assist Prudence Dyer in getting a f/u visit - Primary        Goals    None     Will continue fall precautions      Electronically Signed By: Yadira Sanchez MD   7/22/24  9:40 AM

## 2024-08-14 ENCOUNTER — TELEPHONE (OUTPATIENT)
Dept: FAMILY MEDICINE CLINIC | Facility: CLINIC | Age: 59
End: 2024-08-14

## 2024-08-14 NOTE — TELEPHONE ENCOUNTER
Pt called for her referral paperwork to be completed again to [facility].  Paperwork in Dr. Norris' in box for completion for the \"Clinical question to be answered section\"     Please fax to [phone].

## 2024-09-05 ENCOUNTER — TELEPHONE (OUTPATIENT)
Dept: FAMILY MEDICINE CLINIC | Facility: CLINIC | Age: 59
End: 2024-09-05

## 2024-09-05 NOTE — TELEPHONE ENCOUNTER
Received medical records request from patient requesting records to be sent to [facility] for continuation of care as she's not able to schedule an appointment until they have her records. Records printed and mailed to [facility], [place], [place]. Waiting on response from [facility] as to what records they need immediately in order for patient to schedule her appointment while they wait for all records to arrive in the mail.

## 2024-11-25 ENCOUNTER — MED REC SCAN ONLY (OUTPATIENT)
Dept: FAMILY MEDICINE CLINIC | Facility: CLINIC | Age: 59
End: 2024-11-25

## 2024-12-18 NOTE — TELEPHONE ENCOUNTER
From: Abisai Camarillo  To: Oswald Christie PA-C  Sent: 6/18/2023 10:28 PM CDT  Subject: Back Pain    Hello Ms. Yanes,  I saw you back in 2021 due to lumber back pain. Following our visit, I did have some spinal injections. I had a little success, but it did not last. Currently, I cannot stand or walk due to the pain. I'm in pain 24/7. I'm not sure what to do next. May I make an appointment with you or do you recommend something else. I have seen providers outside of [facility]. Currently, I'm on Fentanyl and Norco for the pain. They do not help and I do not want to increase them. I want to do what is necessary to alleviate some of this pain. Last week I went to the ER because it was unbearable. Thank you.   Absiai Camarillo Yes

## 2025-01-07 ENCOUNTER — TELEPHONE (OUTPATIENT)
Dept: FAMILY MEDICINE CLINIC | Facility: CLINIC | Age: 60
End: 2025-01-07

## 2025-01-07 NOTE — TELEPHONE ENCOUNTER
Received a PA for pts   HYDROcodone-acetaminophen  MG Oral Tab     Key [id number]    Placed in pats box

## 2025-01-08 NOTE — TELEPHONE ENCOUNTER
Closed in Epic    Close reason: Other  Payer: [employer] Generic Payer  Note from payer: Sorry but [employer] cannot process this PA request electronically.

## 2025-01-10 NOTE — TELEPHONE ENCOUNTER
Mini Dorsey ([place]ake: [id number])    [facility] has not yet replied to your PA request.   To check for an update later, open this request again from your dashboard.    If [facility] has not replied to your request within 24 hours for urgent requests or 72 hours for standard requests, please reach out to the plan using the phone number located on the back on the member's insurance card.

## 2025-01-14 NOTE — TELEPHONE ENCOUNTER
Mini lizama Dorsey (Key: [id number])  PA Case ID #: [id number]  Rx #: [id number]  Need Help? Call us at [phone]  Outcome  Denied on January 10 by [employer] 2017  Denied-Partial. The amount you asked for is over the limit allowed by your plan. This request is for an amount of 100 tablets for 12 days. This drug can only be filled for an amount of 180 tablets for 30 days. We may be able to make an exception to the quantity limit. Your prescriber will need to send us a statement explaining why you need this amount. The notes can be the number of doses available under the limit does not work to treat your condition or there is sound clinical, medical, or scientific evidence that is likely to not work or cause adverse effect, or cause you to not comply with taking the drug.  Authorization Expiration Date: 1/9/2025  Drug  HYDROcodone-Acetaminophen 10-325MG tablets  ePA cloud logo  Form  [employer] Electronic Prior Authorization Request Form

## 2025-01-15 ENCOUNTER — TELEPHONE (OUTPATIENT)
Dept: FAMILY MEDICINE CLINIC | Facility: CLINIC | Age: 60
End: 2025-01-15

## 2025-01-15 NOTE — TELEPHONE ENCOUNTER
Pt needs a refill on her   fentaNYL 25 MCG/HR Transdermal Patch 72 Hr     Only received 9 of her 10 patches with her last refill

## 2025-01-17 NOTE — TELEPHONE ENCOUNTER
Left detailed voicemail. Forward to Front office to schedule.    Front Office: Patient overdue for follow-up. Please schedule patient for OV before further refills will be given.

## 2025-01-17 NOTE — TELEPHONE ENCOUNTER
She cancelsed last 2 appointmenst. This med cannot be filled without regualr appointmens. Please help schedule sooner than 2.10

## 2025-02-05 ENCOUNTER — PATIENT MESSAGE (OUTPATIENT)
Dept: FAMILY MEDICINE CLINIC | Facility: CLINIC | Age: 60
End: 2025-02-05

## 2025-02-05 ENCOUNTER — TELEPHONE (OUTPATIENT)
Dept: FAMILY MEDICINE CLINIC | Facility: CLINIC | Age: 60
End: 2025-02-05

## 2025-02-05 NOTE — TELEPHONE ENCOUNTER
Patient is calling that she is needing a letter from  since she applied for supplement part G she got denied. They said it was due to the hydrocodone.     Patient will be sending over a [employer]-ZONE message address what they said that is needed for the appeal.       Also has an appointment right now on 2/10/2025 and needs to figure out medication especially the fentyl medication.

## 2025-02-06 NOTE — TELEPHONE ENCOUNTER
Mini Dorsey  [facility] Clinical Staff (supporting Hubert Norris MD)13 hours ago (11:51 PM)     MS  Dear Dr. Norris:      I’m sending this message to address 2 issues. The first is that my application for Medicare Gap coverage was denied. It was due to having hydrocodone on my medications list. I need help filing an appeal. I will include the format in a second message to you. I'm not sure if I meet the qualifications. Will you please help me?       The next issue is my tele-med appointment on 2/10/25. Do you have a sliding pay scale for people without insurance?  I filled my last prescription for hydrocodone on 01/11/25. I will need to fill a prescription for Fentanyl patches on 02/16/25. I understand you cannot write the prescriptions without seeing me.       Thank you      Mini Norman

## 2025-02-06 NOTE — TELEPHONE ENCOUNTER
Pt has insurance that we do not take- unable to be seen in this office. Pt in process of changing insurance and was denied the Medicare D . Pt asking for Dr. Norris to write a letter stating that she needs insurance, health issues and why on pain meds.  Pt also requesting a refill on meds till this is all figured out.  LOV 10/2/24- televisit.  Pt states never weaned off the Fentanyl

## 2025-02-07 NOTE — TELEPHONE ENCOUNTER
I generated a letter but sent her a MyChart message as I cannot rule out opioid use or pain management as I have been providing them for her so she is ruled in for those.  I am not sure what is meant by those lines.  I can cue up a refill for the 2 medications for the month of February without being seen but she needs to figure out her insurance.  Thanks   Name band;

## 2025-02-08 NOTE — ASSESSMENT & PLAN NOTE
Diagnosis at [place] in 2012, plasmaphoressis initially but not covered after 2016. Bed ound and in a to of philip on disability. Com

## 2025-02-08 NOTE — ASSESSMENT & PLAN NOTE
Last BMI: 0.    BMI Readings from Last 15 Encounters:   11/13/23 18.95 kg/m²   10/12/23 19.75 kg/m²   08/25/23 19.13 kg/m²   07/11/23 19.13 kg/m²   06/07/23 19.49 kg/m²   12/22/21 19.20 kg/m²   08/06/21 17.92 kg/m²   08/03/21 17.74 kg/m²   07/07/21 20.67 kg/m²   10/22/20 18.66 kg/m²   07/24/20 20.67 kg/m²   03/02/20 20.30 kg/m²   02/27/20 21.22 kg/m²   01/26/20 21.25 kg/m²   12/02/19 21.03 kg/m²     Etiology: Inflammatory  Clinical Findings: Temporal wasting  Treatment Plan: Liberalize Diet  Reassess this in : 3 months

## 2025-02-08 NOTE — ASSESSMENT & PLAN NOTE
Thyroid shows Good control.   9/14/2023: TSH 1.340  Thryoid Meds: levothyroxine Tabs - 100 MCG well controlled and stable current treatment plan effective, no change in therapy   Diagnosis at [facility] in 2005.

## 2025-02-08 NOTE — ASSESSMENT & PLAN NOTE
Major Depressive Disorder, Last PHQ score 0, on 7/5/2018,  , Severity: Moderate (PHQ Score 10-14)  Recurrent episode currently active  Clinical Course: unchanged Fair control  Currently not at optimal state: Treatment plan adjusted.   Antidepressant Meds: traZODone Tabs - 100 MG; venlafaxine ER Cp24 - 150 MG  Reassess this in : 3 months.    Complicated by being bed bound and home bound and disabled sine 2005.

## 2025-02-08 NOTE — ASSESSMENT & PLAN NOTE
Diagnosis 2020 with breo. Stable, continue present management and continue to monitor for progression

## 2025-02-10 ENCOUNTER — TELEMEDICINE (OUTPATIENT)
Dept: FAMILY MEDICINE CLINIC | Facility: CLINIC | Age: 60
End: 2025-02-10

## 2025-02-10 DIAGNOSIS — J41.0 SIMPLE CHRONIC BRONCHITIS (HCC): Chronic | ICD-10-CM

## 2025-02-10 DIAGNOSIS — E46 PROTEIN-CALORIE MALNUTRITION, UNSPECIFIED SEVERITY (HCC): Primary | ICD-10-CM

## 2025-02-10 DIAGNOSIS — G89.4 CHRONIC PAIN SYNDROME: ICD-10-CM

## 2025-02-10 DIAGNOSIS — G04.81: Chronic | ICD-10-CM

## 2025-02-10 DIAGNOSIS — G89.29 CHRONIC MIDLINE LOW BACK PAIN WITHOUT SCIATICA: ICD-10-CM

## 2025-02-10 DIAGNOSIS — D80.1 HYPOGAMMAGLOBULINEMIA, ACQUIRED (HCC): ICD-10-CM

## 2025-02-10 DIAGNOSIS — E06.3 CHRONIC LYMPHOCYTIC THYROIDITIS: Chronic | ICD-10-CM

## 2025-02-10 DIAGNOSIS — G47.10 HYPERSOMNIA: ICD-10-CM

## 2025-02-10 DIAGNOSIS — F33.1 MODERATE EPISODE OF RECURRENT MAJOR DEPRESSIVE DISORDER (HCC): ICD-10-CM

## 2025-02-10 DIAGNOSIS — M54.50 CHRONIC MIDLINE LOW BACK PAIN WITHOUT SCIATICA: ICD-10-CM

## 2025-02-10 RX ORDER — HYDROCODONE BITARTRATE AND ACETAMINOPHEN 10; 325 MG/1; MG/1
2 TABLET ORAL EVERY 6 HOURS PRN
Qty: 100 TABLET | Refills: 0 | Status: SHIPPED | OUTPATIENT
Start: 2025-02-24 | End: 2025-03-10

## 2025-02-10 RX ORDER — HYDROCODONE BITARTRATE AND ACETAMINOPHEN 10; 325 MG/1; MG/1
2 TABLET ORAL EVERY 6 HOURS PRN
Qty: 100 TABLET | Refills: 0 | Status: SHIPPED | OUTPATIENT
Start: 2025-03-24 | End: 2025-04-07

## 2025-02-10 RX ORDER — FENTANYL 25 UG/1
1 PATCH TRANSDERMAL
Qty: 10 PATCH | Refills: 0 | Status: SHIPPED | OUTPATIENT
Start: 2025-03-12 | End: 2025-04-11

## 2025-02-10 RX ORDER — HYDROCODONE BITARTRATE AND ACETAMINOPHEN 10; 325 MG/1; MG/1
2 TABLET ORAL EVERY 6 HOURS PRN
Qty: 100 TABLET | Refills: 0 | Status: SHIPPED | OUTPATIENT
Start: 2025-03-10 | End: 2025-03-24

## 2025-02-10 RX ORDER — DEXTROAMPHETAMINE SACCHARATE, AMPHETAMINE ASPARTATE, DEXTROAMPHETAMINE SULFATE AND AMPHETAMINE SULFATE 5; 5; 5; 5 MG/1; MG/1; MG/1; MG/1
20 TABLET ORAL 2 TIMES DAILY
Qty: 60 TABLET | Refills: 0 | Status: SHIPPED | OUTPATIENT
Start: 2025-04-11 | End: 2025-05-11

## 2025-02-10 RX ORDER — DEXTROAMPHETAMINE SACCHARATE, AMPHETAMINE ASPARTATE, DEXTROAMPHETAMINE SULFATE AND AMPHETAMINE SULFATE 5; 5; 5; 5 MG/1; MG/1; MG/1; MG/1
20 TABLET ORAL 2 TIMES DAILY
Qty: 60 TABLET | Refills: 0 | Status: SHIPPED | OUTPATIENT
Start: 2025-03-12 | End: 2025-04-11

## 2025-02-10 RX ORDER — FENTANYL 25 UG/1
1 PATCH TRANSDERMAL
Qty: 10 PATCH | Refills: 0 | Status: SHIPPED | OUTPATIENT
Start: 2025-04-11 | End: 2025-05-11

## 2025-02-10 RX ORDER — FENTANYL 25 UG/1
1 PATCH TRANSDERMAL
Qty: 10 PATCH | Refills: 0 | Status: SHIPPED | OUTPATIENT
Start: 2025-02-10 | End: 2025-03-12

## 2025-02-10 RX ORDER — DEXTROAMPHETAMINE SACCHARATE, AMPHETAMINE ASPARTATE, DEXTROAMPHETAMINE SULFATE AND AMPHETAMINE SULFATE 5; 5; 5; 5 MG/1; MG/1; MG/1; MG/1
20 TABLET ORAL 2 TIMES DAILY
Qty: 60 TABLET | Refills: 0 | Status: SHIPPED | OUTPATIENT
Start: 2025-02-10 | End: 2025-03-12

## 2025-02-10 RX ORDER — HYDROCODONE BITARTRATE AND ACETAMINOPHEN 10; 325 MG/1; MG/1
2 TABLET ORAL EVERY 6 HOURS PRN
Qty: 100 TABLET | Refills: 0 | Status: SHIPPED | OUTPATIENT
Start: 2025-02-10 | End: 2025-02-24

## 2025-02-10 NOTE — PROGRESS NOTES
Mini Norman is a 59 year old female coming in for had no chief complaint listed for this encounter.    Subjective:   HPI trouble with getting insurance. Wants medicare traditional to go back to [place] and they no longer take MA plans.   Still in pain, 4 to 6 noco with 25 mcg fent daily, stable without abuse or diversion,     ROS: GENERAL HEALTH: feels well otherwise  This visit is conducted using Telemedicine with live, interactive video and audio.    Patient has been referred to the [facility] website at [email or web address] to review the yearly Consent to Treat document.    Patient understands and accepts financial responsibility for any deductible, co-insurance and/or co-pays associated with this service.         Objective:   There were no vitals taken for this visit. There is no height or weight on file to calculate BMI.   Physical Exam  Constitutional:       Appearance: She is well-developed.   HENT:      Head: Normocephalic and atraumatic.   Pulmonary:      Effort: Pulmonary effort is normal. No respiratory distress.   Musculoskeletal:         General: Normal range of motion.      Cervical back: Normal range of motion.   Skin:     Findings: No rash.   Neurological:      Mental Status: She is alert and oriented to person, place, and time.   Psychiatric:         Mood and Affect: Mood normal.         Behavior: Behavior normal.         Thought Content: Thought content normal.         Judgment: Judgment normal.           Assessment & Plan  Protein-calorie malnutrition, unspecified severity (HCC)  Last BMI: 0.    BMI Readings from Last 15 Encounters:   11/13/23 18.95 kg/m²   10/12/23 19.75 kg/m²   08/25/23 19.13 kg/m²   07/11/23 19.13 kg/m²   06/07/23 19.49 kg/m²   12/22/21 19.20 kg/m²   08/06/21 17.92 kg/m²   08/03/21 17.74 kg/m²   07/07/21 20.67 kg/m²   10/22/20 18.66 kg/m²   07/24/20 20.67 kg/m²   03/02/20 20.30 kg/m²   02/27/20 21.22 kg/m²   01/26/20 21.25 kg/m²   12/02/19 21.03 kg/m²     Etiology:  Inflammatory  Clinical Findings: Temporal wasting  Treatment Plan: Liberalize Diet  Reassess this in : 3 months          Hypogammaglobulinemia, acquired ([facility])  Plasmaphoresisi in past. Continue to monitor closely. Continue to monitor and continue present management          Simple chronic bronchitis (HCC)  Diagnosis 2020 with breo. Stable, continue present management and continue to monitor for progression          Moderate episode of recurrent major depressive disorder (HCC)   Major Depressive Disorder, Last PHQ score 0, on 7/5/2018,  , Severity: Moderate (PHQ Score 10-14)  Recurrent episode currently active  Clinical Course: unchanged Fair control  Currently not at optimal state: Treatment plan adjusted.   Antidepressant Meds: traZODone Tabs - 100 MG; venlafaxine ER Cp24 - 150 MG  Reassess this in : 3 months.    Complicated by being bed bound and home bound and disabled sine 2005.          Chronic lymphocytic thyroiditis  Thyroid shows Good control.   9/14/2023: TSH 1.340  Thryoid Meds: levothyroxine Tabs - 100 MCG well controlled and stable current treatment plan effective, no change in therapy   Diagnosis at [facility] in 2005.          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 Bed ound and in a to of philip on disability. Com          Chronic pain syndrome    Orders:    fentaNYL 25 MCG/HR Transdermal Patch 72 Hr; Place 1 patch onto the skin every third day.    fentaNYL 25 MCG/HR Transdermal Patch 72 Hr; Place 1 patch onto the skin every third day.    fentaNYL 25 MCG/HR Transdermal Patch 72 Hr; Place 1 patch onto the skin every third day.    HYDROcodone-acetaminophen  MG Oral Tab; Take 2 tablets by mouth every 6 (six) hours as needed for Pain.    HYDROcodone-acetaminophen  MG Oral Tab; Take 2 tablets by mouth every 6 (six) hours as needed for Pain.    HYDROcodone-acetaminophen  MG  Oral Tab; Take 2 tablets by mouth every 6 (six) hours as needed for Pain.    HYDROcodone-acetaminophen  MG Oral Tab; Take 2 tablets by mouth every 6 (six) hours as needed for Pain.    Chronic midline low back pain without sciatica  Doing OK, still 4-6 for pain with fent, gave 3 months patch,  and 8 weeks Norco, will give 2 more scripts before next visit. But she will MyChart progress first.     Orders:    fentaNYL 25 MCG/HR Transdermal Patch 72 Hr; Place 1 patch onto the skin every third day.    fentaNYL 25 MCG/HR Transdermal Patch 72 Hr; Place 1 patch onto the skin every third day.    fentaNYL 25 MCG/HR Transdermal Patch 72 Hr; Place 1 patch onto the skin every third day.    HYDROcodone-acetaminophen  MG Oral Tab; Take 2 tablets by mouth every 6 (six) hours as needed for Pain.    HYDROcodone-acetaminophen  MG Oral Tab; Take 2 tablets by mouth every 6 (six) hours as needed for Pain.    HYDROcodone-acetaminophen  MG Oral Tab; Take 2 tablets by mouth every 6 (six) hours as needed for Pain.    HYDROcodone-acetaminophen  MG Oral Tab; Take 2 tablets by mouth every 6 (six) hours as needed for Pain.    Hypersomnia    Orders:    amphetamine-dextroamphetamine (ADDERALL) 20 MG Oral Tab; Take 1 tablet (20 mg total) by mouth 2 (two) times daily.    amphetamine-dextroamphetamine (ADDERALL) 20 MG Oral Tab; Take 1 tablet (20 mg total) by mouth 2 (two) times daily.    amphetamine-dextroamphetamine (ADDERALL) 20 MG Oral Tab; Take 1 tablet (20 mg total) by mouth 2 (two) times daily.          I am having Mini Dorsey start on HYDROcodone-acetaminophen, HYDROcodone-acetaminophen, and HYDROcodone-acetaminophen. I am also having her maintain her melatonin, NON FORMULARY, amphetamine-dextroamphetamine, traZODone, venlafaxine ER, ondansetron, atorvastatin, HYDROcodone-acetaminophen, HYDROcodone-acetaminophen, HYDROcodone-acetaminophen, HYDROcodone-acetaminophen, levothyroxine, fentaNYL, fentaNYL, fentaNYL,  HYDROcodone-acetaminophen, amphetamine-dextroamphetamine, amphetamine-dextroamphetamine, and amphetamine-dextroamphetamine.       Return in about 3 months (around 5/10/2025) for recheck.

## 2025-02-11 NOTE — ASSESSMENT & PLAN NOTE
Orders:    fentaNYL 25 MCG/HR Transdermal Patch 72 Hr; Place 1 patch onto the skin every third day.    fentaNYL 25 MCG/HR Transdermal Patch 72 Hr; Place 1 patch onto the skin every third day.    fentaNYL 25 MCG/HR Transdermal Patch 72 Hr; Place 1 patch onto the skin every third day.    HYDROcodone-acetaminophen  MG Oral Tab; Take 2 tablets by mouth every 6 (six) hours as needed for Pain.    HYDROcodone-acetaminophen  MG Oral Tab; Take 2 tablets by mouth every 6 (six) hours as needed for Pain.    HYDROcodone-acetaminophen  MG Oral Tab; Take 2 tablets by mouth every 6 (six) hours as needed for Pain.    HYDROcodone-acetaminophen  MG Oral Tab; Take 2 tablets by mouth every 6 (six) hours as needed for Pain.

## 2025-02-11 NOTE — ASSESSMENT & PLAN NOTE
Doing OK, still 4-6 for pain with fent, gave 3 months patch,  and 8 weeks Norco, will give 2 more scripts before next visit. But she will MyChart progress first.     Orders:    fentaNYL 25 MCG/HR Transdermal Patch 72 Hr; Place 1 patch onto the skin every third day.    fentaNYL 25 MCG/HR Transdermal Patch 72 Hr; Place 1 patch onto the skin every third day.    fentaNYL 25 MCG/HR Transdermal Patch 72 Hr; Place 1 patch onto the skin every third day.    HYDROcodone-acetaminophen  MG Oral Tab; Take 2 tablets by mouth every 6 (six) hours as needed for Pain.    HYDROcodone-acetaminophen  MG Oral Tab; Take 2 tablets by mouth every 6 (six) hours as needed for Pain.    HYDROcodone-acetaminophen  MG Oral Tab; Take 2 tablets by mouth every 6 (six) hours as needed for Pain.    HYDROcodone-acetaminophen  MG Oral Tab; Take 2 tablets by mouth every 6 (six) hours as needed for Pain.

## 2025-02-11 NOTE — ASSESSMENT & PLAN NOTE
Orders:    amphetamine-dextroamphetamine (ADDERALL) 20 MG Oral Tab; Take 1 tablet (20 mg total) by mouth 2 (two) times daily.    amphetamine-dextroamphetamine (ADDERALL) 20 MG Oral Tab; Take 1 tablet (20 mg total) by mouth 2 (two) times daily.    amphetamine-dextroamphetamine (ADDERALL) 20 MG Oral Tab; Take 1 tablet (20 mg total) by mouth 2 (two) times daily.

## 2025-04-01 ENCOUNTER — PATIENT MESSAGE (OUTPATIENT)
Dept: FAMILY MEDICINE CLINIC | Facility: CLINIC | Age: 60
End: 2025-04-01

## 2025-04-01 DIAGNOSIS — G89.29 CHRONIC MIDLINE LOW BACK PAIN WITHOUT SCIATICA: ICD-10-CM

## 2025-04-01 DIAGNOSIS — G89.4 CHRONIC PAIN SYNDROME: ICD-10-CM

## 2025-04-01 DIAGNOSIS — M54.50 CHRONIC MIDLINE LOW BACK PAIN WITHOUT SCIATICA: ICD-10-CM

## 2025-04-02 DIAGNOSIS — G89.29 CHRONIC MIDLINE LOW BACK PAIN WITHOUT SCIATICA: ICD-10-CM

## 2025-04-02 DIAGNOSIS — G89.4 CHRONIC PAIN SYNDROME: ICD-10-CM

## 2025-04-02 DIAGNOSIS — M54.50 CHRONIC MIDLINE LOW BACK PAIN WITHOUT SCIATICA: ICD-10-CM

## 2025-04-03 RX ORDER — HYDROCODONE BITARTRATE AND ACETAMINOPHEN 10; 325 MG/1; MG/1
2 TABLET ORAL EVERY 6 HOURS PRN
Qty: 100 TABLET | Refills: 0 | Status: SHIPPED | OUTPATIENT
Start: 2025-04-07 | End: 2025-04-21

## 2025-04-03 RX ORDER — HYDROCODONE BITARTRATE AND ACETAMINOPHEN 10; 325 MG/1; MG/1
2 TABLET ORAL EVERY 6 HOURS PRN
Qty: 100 TABLET | Refills: 0 | OUTPATIENT
Start: 2025-04-03 | End: 2025-04-17

## 2025-04-03 RX ORDER — FENTANYL 25 UG/1
1 PATCH TRANSDERMAL
Qty: 10 PATCH | Refills: 0 | Status: SHIPPED | OUTPATIENT
Start: 2025-04-10 | End: 2025-05-10

## 2025-04-03 RX ORDER — HYDROCODONE BITARTRATE AND ACETAMINOPHEN 10; 325 MG/1; MG/1
2 TABLET ORAL EVERY 6 HOURS PRN
Qty: 100 TABLET | Refills: 0 | Status: SHIPPED | OUTPATIENT
Start: 2025-05-05 | End: 2025-05-19

## 2025-04-03 RX ORDER — HYDROCODONE BITARTRATE AND ACETAMINOPHEN 10; 325 MG/1; MG/1
2 TABLET ORAL EVERY 6 HOURS PRN
Qty: 100 TABLET | Refills: 0 | Status: SHIPPED | OUTPATIENT
Start: 2025-04-21 | End: 2025-05-05

## 2025-04-03 NOTE — TELEPHONE ENCOUNTER
1. Chronic pain syndrome  Overview:  Due to chronic encephalitis, Off opiod since May 2017   Orders:  -     HYDROcodone-Acetaminophen; Take 2 tablets by mouth every 6 (six) hours as needed for Pain.  Dispense: 100 tablet; Refill: 0  -     fentaNYL; Place 1 patch onto the skin every third day.  Dispense: 10 patch; Refill: 0  -     HYDROcodone-Acetaminophen; Take 2 tablets by mouth every 6 (six) hours as needed for Pain.  Dispense: 100 tablet; Refill: 0  -     HYDROcodone-Acetaminophen; Take 2 tablets by mouth every 6 (six) hours as needed for Pain.  Dispense: 100 tablet; Refill: 0  2. Chronic midline low back pain without sciatica  -     HYDROcodone-Acetaminophen; Take 2 tablets by mouth every 6 (six) hours as needed for Pain.  Dispense: 100 tablet; Refill: 0  -     fentaNYL; Place 1 patch onto the skin every third day.  Dispense: 10 patch; Refill: 0  -     HYDROcodone-Acetaminophen; Take 2 tablets by mouth every 6 (six) hours as needed for Pain.  Dispense: 100 tablet; Refill: 0  -     HYDROcodone-Acetaminophen; Take 2 tablets by mouth every 6 (six) hours as needed for Pain.  Dispense: 100 tablet; Refill: 0       OK to notify. Thanks, Jonny Norris MD

## 2025-04-10 ENCOUNTER — TELEPHONE (OUTPATIENT)
Dept: FAMILY MEDICINE CLINIC | Facility: CLINIC | Age: 60
End: 2025-04-10

## 2025-04-10 DIAGNOSIS — E06.3 HASHIMOTO'S THYROIDITIS: ICD-10-CM

## 2025-04-10 DIAGNOSIS — G04.81: Chronic | ICD-10-CM

## 2025-04-10 NOTE — TELEPHONE ENCOUNTER
Received PA from [facility] for fentanyl 25 MCG 72 hr patches    Paperwork in Pat,RN's in box for review.    KEY: [id number]    LM for pt explaining PA process.

## 2025-04-11 RX ORDER — LEVOTHYROXINE SODIUM 100 UG/1
100 TABLET ORAL
Qty: 90 TABLET | Refills: 1 | Status: SHIPPED | OUTPATIENT
Start: 2025-04-11

## 2025-04-11 NOTE — TELEPHONE ENCOUNTER
Requested Prescriptions     Pending Prescriptions Disp Refills    LEVOTHYROXINE 100 MCG Oral Tab [Pharmacy Med Name: LEVOTHYROXINE 0.100MG (100MCG) TAB] 90 tablet 1     Sig: TAKE 1 TABLET(100 MCG) BY MOUTH BEFORE BREAKFAST     LOV 9/14/23    Patient was asked to follow-up in: 2 months    Appointment scheduled: 5/8/2025 Hubert Norris MD     Medication failed protocol.    Routed to front staff     Patient is diue for their annual physical please call patient and have them schedule an appt

## 2025-04-14 DIAGNOSIS — E78.2 MIXED HYPERLIPIDEMIA: Chronic | ICD-10-CM

## 2025-04-14 RX ORDER — ATORVASTATIN CALCIUM 10 MG/1
10 TABLET, FILM COATED ORAL NIGHTLY
Qty: 90 TABLET | Refills: 3 | Status: SHIPPED | OUTPATIENT
Start: 2025-04-14

## 2025-04-14 NOTE — TELEPHONE ENCOUNTER
Requested Prescriptions     Pending Prescriptions Disp Refills    ATORVASTATIN 10 MG Oral Tab [Pharmacy Med Name: ATORVASTATIN 10MG TABLETS] 90 tablet 3     Sig: TAKE 1 TABLET(10 MG) BY MOUTH EVERY NIGHT     LOV Visit date not found     Patient was asked to follow-up in: 2 months    Appointment scheduled: 5/8/2025 Hubert Norris MD     Medication failed protocol.    Routed to front staff     Patient is due for their annual physical please call patient and have them schedule an appt

## 2025-05-08 ENCOUNTER — TELEPHONE (OUTPATIENT)
Dept: FAMILY MEDICINE CLINIC | Facility: CLINIC | Age: 60
End: 2025-05-08

## 2025-05-08 ENCOUNTER — TELEMEDICINE (OUTPATIENT)
Dept: FAMILY MEDICINE CLINIC | Facility: CLINIC | Age: 60
End: 2025-05-08
Payer: MEDICARE

## 2025-05-08 DIAGNOSIS — J41.0 SIMPLE CHRONIC BRONCHITIS (HCC): ICD-10-CM

## 2025-05-08 DIAGNOSIS — F33.1 MODERATE EPISODE OF RECURRENT MAJOR DEPRESSIVE DISORDER (HCC): ICD-10-CM

## 2025-05-08 DIAGNOSIS — G47.10 HYPERSOMNIA: ICD-10-CM

## 2025-05-08 DIAGNOSIS — G04.81: ICD-10-CM

## 2025-05-08 DIAGNOSIS — G89.4 CHRONIC PAIN SYNDROME: ICD-10-CM

## 2025-05-08 DIAGNOSIS — E78.2 MIXED HYPERLIPIDEMIA: ICD-10-CM

## 2025-05-08 DIAGNOSIS — G89.29 CHRONIC MIDLINE LOW BACK PAIN WITHOUT SCIATICA: ICD-10-CM

## 2025-05-08 DIAGNOSIS — D80.1 HYPOGAMMAGLOBULINEMIA, ACQUIRED (HCC): ICD-10-CM

## 2025-05-08 DIAGNOSIS — E46 PROTEIN-CALORIE MALNUTRITION, UNSPECIFIED SEVERITY (HCC): ICD-10-CM

## 2025-05-08 DIAGNOSIS — M54.50 CHRONIC MIDLINE LOW BACK PAIN WITHOUT SCIATICA: ICD-10-CM

## 2025-05-08 DIAGNOSIS — Z00.00 ANNUAL PHYSICAL EXAM: Primary | ICD-10-CM

## 2025-05-08 PROCEDURE — 99214 OFFICE O/P EST MOD 30 MIN: CPT | Performed by: FAMILY MEDICINE

## 2025-05-08 RX ORDER — DEXTROAMPHETAMINE SACCHARATE, AMPHETAMINE ASPARTATE, DEXTROAMPHETAMINE SULFATE AND AMPHETAMINE SULFATE 5; 5; 5; 5 MG/1; MG/1; MG/1; MG/1
20 TABLET ORAL 2 TIMES DAILY
Qty: 60 TABLET | Refills: 0 | Status: SHIPPED | OUTPATIENT
Start: 2025-05-08 | End: 2025-06-07

## 2025-05-08 RX ORDER — HYDROCODONE BITARTRATE AND ACETAMINOPHEN 10; 325 MG/1; MG/1
2 TABLET ORAL EVERY 6 HOURS PRN
Qty: 100 TABLET | Refills: 0 | Status: SHIPPED | OUTPATIENT
Start: 2025-07-07 | End: 2025-07-21

## 2025-05-08 RX ORDER — DEXTROAMPHETAMINE SACCHARATE, AMPHETAMINE ASPARTATE, DEXTROAMPHETAMINE SULFATE AND AMPHETAMINE SULFATE 5; 5; 5; 5 MG/1; MG/1; MG/1; MG/1
20 TABLET ORAL 2 TIMES DAILY
Qty: 60 TABLET | Refills: 0 | Status: SHIPPED | OUTPATIENT
Start: 2025-07-07 | End: 2025-08-06

## 2025-05-08 RX ORDER — DEXTROAMPHETAMINE SACCHARATE, AMPHETAMINE ASPARTATE, DEXTROAMPHETAMINE SULFATE AND AMPHETAMINE SULFATE 5; 5; 5; 5 MG/1; MG/1; MG/1; MG/1
20 TABLET ORAL 2 TIMES DAILY
Qty: 60 TABLET | Refills: 0 | Status: SHIPPED | OUTPATIENT
Start: 2025-06-07 | End: 2025-07-07

## 2025-05-08 RX ORDER — FENTANYL 25 UG/1
1 PATCH TRANSDERMAL
Qty: 10 PATCH | Refills: 0 | Status: SHIPPED | OUTPATIENT
Start: 2025-05-08 | End: 2025-06-07

## 2025-05-08 RX ORDER — HYDROCODONE BITARTRATE AND ACETAMINOPHEN 10; 325 MG/1; MG/1
2 TABLET ORAL EVERY 6 HOURS PRN
Qty: 100 TABLET | Refills: 0 | Status: SHIPPED | OUTPATIENT
Start: 2025-05-08 | End: 2025-05-22

## 2025-05-08 RX ORDER — FENTANYL 25 UG/1
1 PATCH TRANSDERMAL
Qty: 10 PATCH | Refills: 0 | Status: SHIPPED | OUTPATIENT
Start: 2025-07-07 | End: 2025-08-06

## 2025-05-08 RX ORDER — HYDROCODONE BITARTRATE AND ACETAMINOPHEN 10; 325 MG/1; MG/1
2 TABLET ORAL EVERY 6 HOURS PRN
Qty: 100 TABLET | Refills: 0 | Status: SHIPPED | OUTPATIENT
Start: 2025-06-07 | End: 2025-06-21

## 2025-05-08 RX ORDER — FENTANYL 25 UG/1
1 PATCH TRANSDERMAL
Qty: 10 PATCH | Refills: 0 | Status: SHIPPED | OUTPATIENT
Start: 2025-06-07 | End: 2025-07-07

## 2025-05-08 NOTE — ASSESSMENT & PLAN NOTE
Last BMI: 0.    BMI Readings from Last 15 Encounters:   11/13/23 18.95 kg/m²   10/12/23 19.75 kg/m²   08/25/23 19.13 kg/m²   07/11/23 19.13 kg/m²   06/07/23 19.49 kg/m²   12/22/21 19.20 kg/m²   08/06/21 17.92 kg/m²   08/03/21 17.74 kg/m²   07/07/21 20.67 kg/m²   10/22/20 18.66 kg/m²   07/24/20 20.67 kg/m²   03/02/20 20.30 kg/m²   02/27/20 21.22 kg/m²   01/26/20 21.25 kg/m²   12/02/19 21.03 kg/m²     Etiology: Inflammatory, Cachexia, and Multifactorial  Clinical Findings: Temporal wasting  Treatment Plan: Liberalize Diet  Reassess this in : 3 months

## 2025-05-08 NOTE — ASSESSMENT & PLAN NOTE
Stable, continue present management and continue to monitor for progression on meds. Continue to monitor and continue present management     Orders:    Amphetamine-Dextroamphetamine; Take 1 tablet (20 mg total) by mouth 2 (two) times daily.  Dispense: 60 tablet; Refill: 0    Amphetamine-Dextroamphetamine; Take 1 tablet (20 mg total) by mouth 2 (two) times daily.  Dispense: 60 tablet; Refill: 0    Amphetamine-Dextroamphetamine; Take 1 tablet (20 mg total) by mouth 2 (two) times daily.  Dispense: 60 tablet; Refill: 0

## 2025-05-08 NOTE — TELEPHONE ENCOUNTER
Received PA from [facility] for fentanyl 25 mcg/72 hr patches    KEY : [id number]    LM for patient explaining the PA process and to call back with questions.

## 2025-05-08 NOTE — TELEPHONE ENCOUNTER
Closed - EPIC    Close reason: Other  Payer: [employer] Generic Payer  Note from payer: Sorry but [employer] cannot process this PA request electronically. Please refer to the original instructions from the PBM for information on how to process this prior authorization manually.

## 2025-05-08 NOTE — ASSESSMENT & PLAN NOTE
Chronic pain, on meds. Due to chronic encephalitis.     Orders:    fentaNYL; Place 1 patch onto the skin every third day.  Dispense: 10 patch; Refill: 0    fentaNYL; Place 1 patch onto the skin every third day.  Dispense: 10 patch; Refill: 0    fentaNYL; Place 1 patch onto the skin every third day.  Dispense: 10 patch; Refill: 0    HYDROcodone-Acetaminophen; Take 2 tablets by mouth every 6 (six) hours as needed for Pain.  Dispense: 100 tablet; Refill: 0    HYDROcodone-Acetaminophen; Take 2 tablets by mouth every 6 (six) hours as needed for Pain.  Dispense: 100 tablet; Refill: 0    HYDROcodone-Acetaminophen; Take 2 tablets by mouth every 6 (six) hours as needed for Pain.  Dispense: 100 tablet; Refill: 0

## 2025-05-08 NOTE — ASSESSMENT & PLAN NOTE
Cholesterol shows Good control. Long term heart-healthy diet and lifestyle discussed and encouraged to reduce risk of cardiovascular disease.  9/14/2023: Cholesterol, Total 161; HDL Cholesterol 72 (H); Triglycerides 122; LDL Cholesterol 68  Cholesterol Meds: atorvastatin Tabs - 10 MG  stable  Continue with current treatment plan

## 2025-05-08 NOTE — ASSESSMENT & PLAN NOTE
Diagnosis at [place] in 2012, plasmaphoressis initially but not covered after 2016. Bed ound and in a to of philip on disability. Com   Stable, continue present management and continue to monitor for progression

## 2025-05-08 NOTE — ASSESSMENT & PLAN NOTE
Plasmaphoresisi in past. Continue to monitor closely. Continue to monitor and continue present managing

## 2025-05-08 NOTE — ASSESSMENT & PLAN NOTE
Stable, continue present management and continue to monitor for progression     Orders:    fentaNYL; Place 1 patch onto the skin every third day.  Dispense: 10 patch; Refill: 0    fentaNYL; Place 1 patch onto the skin every third day.  Dispense: 10 patch; Refill: 0    fentaNYL; Place 1 patch onto the skin every third day.  Dispense: 10 patch; Refill: 0    HYDROcodone-Acetaminophen; Take 2 tablets by mouth every 6 (six) hours as needed for Pain.  Dispense: 100 tablet; Refill: 0    HYDROcodone-Acetaminophen; Take 2 tablets by mouth every 6 (six) hours as needed for Pain.  Dispense: 100 tablet; Refill: 0    HYDROcodone-Acetaminophen; Take 2 tablets by mouth every 6 (six) hours as needed for Pain.  Dispense: 100 tablet; Refill: 0

## 2025-05-08 NOTE — PROGRESS NOTES
Mini Norman is a 59 year old female coming in for had no chief complaint listed for this encounter.    Subjective:   HPI    History of Present Illness  Mini Dorsey is a 59 year old female who presents for medication management and insurance issues.    She is experiencing significant issues with her insurance coverage, having lost her Medicare Advantage plan after signing up for a Part D plan. Currently, she is without a supplemental plan and is seeking assistance to resolve this issue.    She has been experiencing severe allergic reactions, which have led to significant weight loss, now down to 101 pounds. She describes a recent episode where her immune system 'went crazy,' causing her to become allergic to foods and experience vomiting. She feels sick and is unable to eat throughout the day, suspecting that even foods she is not officially allergic to are causing symptoms.    She inquires about Xolair (omalizumab) for her allergies, noting that she has been trained to administer injections herself. However, she acknowledges the difficulty in obtaining approval for this medication due to its high cost and the need for specialist involvement.    She is currently on Adderall, with a recent fill on April 14th, and is managing her medication schedule to avoid frequent pharmacy visits. She also uses fentanyl patches for pain management, reporting some severe pain despite the current dose. She mentions using GoodRx for medication costs as it is cheaper than her Part D plan.    She has a pain management agreement in place, which was renewed during this visit. She is aware of the terms, including responsibility for lost or stolen medications and not seeking early refills.     ROS: GENERAL HEALTH: feels well otherwise    Objective:   There were no vitals taken for this visit. There is no height or weight on file to calculate BMI.   Physical Exam  Constitutional:       Appearance: She is well-developed.   HENT:      Head:  Normocephalic and atraumatic.   Pulmonary:      Effort: Pulmonary effort is normal. No respiratory distress.   Musculoskeletal:         General: Normal range of motion.      Cervical back: Normal range of motion.   Skin:     Findings: No rash.   Neurological:      Mental Status: She is alert and oriented to person, place, and time.   Psychiatric:         Mood and Affect: Mood normal.         Behavior: Behavior normal.         Thought Content: Thought content normal.         Judgment: Judgment normal.         Physical Exam  MEASUREMENTS: Weight- 101.       Assessment & Plan:   Assessment & Plan  Annual physical exam         Mixed hyperlipidemia  Cholesterol shows Good control. Long term heart-healthy diet and lifestyle discussed and encouraged to reduce risk of cardiovascular disease.  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 Bed ound and in a to of apin on disability. Com   Stable, continue present management and continue to monitor for progression            Chronic pain syndrome  Chronic pain, on meds. Due to chronic encephalitis.     Orders:    fentaNYL; Place 1 patch onto the skin every third day.  Dispense: 10 patch; Refill: 0    fentaNYL; Place 1 patch onto the skin every third day.  Dispense: 10 patch; Refill: 0    fentaNYL; Place 1 patch onto the skin every third day.  Dispense: 10 patch; Refill: 0    HYDROcodone-Acetaminophen; Take 2 tablets by mouth every 6 (six) hours as needed for Pain.  Dispense: 100 tablet; Refill: 0    HYDROcodone-Acetaminophen; Take 2 tablets by mouth every 6 (six) hours as needed for Pain.  Dispense: 100 tablet; Refill: 0    HYDROcodone-Acetaminophen; Take 2 tablets  by mouth every 6 (six) hours as needed for Pain.  Dispense: 100 tablet; Refill: 0    Chronic midline low back pain without sciatica  Stable, continue present management and continue to monitor for progression     Orders:    fentaNYL; Place 1 patch onto the skin every third day.  Dispense: 10 patch; Refill: 0    fentaNYL; Place 1 patch onto the skin every third day.  Dispense: 10 patch; Refill: 0    fentaNYL; Place 1 patch onto the skin every third day.  Dispense: 10 patch; Refill: 0    HYDROcodone-Acetaminophen; Take 2 tablets by mouth every 6 (six) hours as needed for Pain.  Dispense: 100 tablet; Refill: 0    HYDROcodone-Acetaminophen; Take 2 tablets by mouth every 6 (six) hours as needed for Pain.  Dispense: 100 tablet; Refill: 0    HYDROcodone-Acetaminophen; Take 2 tablets by mouth every 6 (six) hours as needed for Pain.  Dispense: 100 tablet; Refill: 0    Protein-calorie malnutrition, unspecified severity (HCC)  Last BMI: 0.    BMI Readings from Last 15 Encounters:   11/13/23 18.95 kg/m²   10/12/23 19.75 kg/m²   08/25/23 19.13 kg/m²   07/11/23 19.13 kg/m²   06/07/23 19.49 kg/m²   12/22/21 19.20 kg/m²   08/06/21 17.92 kg/m²   08/03/21 17.74 kg/m²   07/07/21 20.67 kg/m²   10/22/20 18.66 kg/m²   07/24/20 20.67 kg/m²   03/02/20 20.30 kg/m²   02/27/20 21.22 kg/m²   01/26/20 21.25 kg/m²   12/02/19 21.03 kg/m²     Etiology: Inflammatory, Cachexia, and Multifactorial  Clinical Findings: Temporal wasting  Treatment Plan: Liberalize Diet  Reassess this in : 3 months          Hypogammaglobulinemia, acquired (HCC)  Plasmaphoresisi in past. Continue to monitor closely. Continue to monitor and continue present managing           Simple chronic bronchitis (HCC)  Diagnosis 2020 with breo. Stable, continue present management and continue to monitor for progression              Moderate episode of recurrent major depressive disorder (HCC)   Major Depressive Disorder, Last PHQ score 0, on 7/5/2018,  , Severity: Moderate (PHQ Score  10-14)  Recurrent episode currently active  Clinical Course: unchanged Fair control  Currently not at optimal state: Treatment plan adjusted.   Antidepressant Meds: traZODone Tabs - 100 MG; venlafaxine ER Cp24 - 150 MG  Reassess this in : 3 months.    Complicated by being bed bound and home bound and disabled sine 2005.              Hypersomnia  Stable, continue present management and continue to monitor for progression on meds. Continue to monitor and continue present management     Orders:    Amphetamine-Dextroamphetamine; Take 1 tablet (20 mg total) by mouth 2 (two) times daily.  Dispense: 60 tablet; Refill: 0    Amphetamine-Dextroamphetamine; Take 1 tablet (20 mg total) by mouth 2 (two) times daily.  Dispense: 60 tablet; Refill: 0    Amphetamine-Dextroamphetamine; Take 1 tablet (20 mg total) by mouth 2 (two) times daily.  Dispense: 60 tablet; Refill: 0       Assessment & Plan  Allergies with weight loss  Weight loss to 101 pounds due to allergy exacerbation with nausea and vomiting, possibly from food allergies. Xolair discussed but requires specialist involvement for approval.  - Contact Medicare for DAP plan options.  - Consider allergist consultation for allergy management.    Chronic pain syndrome  Chronic pain managed with hydrocodone-acetaminophen and fentanyl patch. Severe pain persists despite current fentanyl dose. Pain agreement reviewed and signed.  - Renew pain agreement with updated dates.  - Continue hydrocodone-acetaminophen as needed.  - Continue fentanyl patch 25 mcg every third day.  - Prescribe Adderall to last through August.  - Schedule follow-up in three months.     Reinforced healthy diet, lifestyle, and exercise.       No follow-ups on file.       Supplementary Documentation:   This visit is conducted using Telemedicine with live, interactive video and audio.   This visit is conducted using Telemedicine with live, interactive video and audio.    Patient has been referred to the Replaced by [facility]  website at [email or web address] to review the yearly Consent to Treat document.    Patient understands and accepts financial responsibility for any deductible, co-insurance and/or co-pays associated with this service.

## 2025-05-09 NOTE — TELEPHONE ENCOUNTER
Mini Dorsey (Key: [id number])  PA Case ID #: [id number]    Status  sent iconSent to Plan today  Drug  fentaNYL 25MCG/HR 72 hr patches    Form  [employer] Electronic Prior Authorization Request Form

## 2025-05-09 NOTE — TELEPHONE ENCOUNTER
Mini Dorsey (Key: [id number])  PA Case ID #: [id number]    Outcome  Approved on May 8 by [employer] 2017  Approved. This drug has been approved under the Member's Medicare Part D benefit. Approved quantity: 10 units per 30 day(s). You may fill up to a 90 day supply except for those on Specialty Tier 5, which can be filled up to a 30 day supply. Please call the pharmacy to process the prescription claim.  Effective Date: 4/24/2025  Authorization Expiration Date: 12/31/2099  Drug  fentaNYL 25MCG/HR 72 hr patches    Form  [employer] Electronic Prior Authorization Request Form

## 2025-05-10 DIAGNOSIS — F33.0 MILD RECURRENT MAJOR DEPRESSION: Chronic | ICD-10-CM

## 2025-05-11 RX ORDER — VENLAFAXINE HYDROCHLORIDE 150 MG/1
300 CAPSULE, EXTENDED RELEASE ORAL DAILY
Qty: 180 CAPSULE | Refills: 3 | Status: SHIPPED | OUTPATIENT
Start: 2025-05-11

## 2025-05-11 NOTE — TELEPHONE ENCOUNTER
Requested Prescriptions     Signed Prescriptions Disp Refills    VENLAFAXINE  MG Oral Capsule SR 24 Hr 180 capsule 3     Sig: TAKE 2 CAPSULES BY MOUTH EVERY DAY     Authorizing Provider: NATHANIEL SOL     Ordering User: ROLA SILVA      Refilled per protocol/OV notes

## 2025-05-21 ENCOUNTER — PATIENT OUTREACH (OUTPATIENT)
Dept: FAMILY MEDICINE CLINIC | Facility: CLINIC | Age: 60
End: 2025-05-21

## 2025-07-06 ENCOUNTER — PATIENT MESSAGE (OUTPATIENT)
Dept: FAMILY MEDICINE CLINIC | Facility: CLINIC | Age: 60
End: 2025-07-06

## 2025-07-06 DIAGNOSIS — M54.50 CHRONIC MIDLINE LOW BACK PAIN WITHOUT SCIATICA: ICD-10-CM

## 2025-07-06 DIAGNOSIS — G89.29 CHRONIC MIDLINE LOW BACK PAIN WITHOUT SCIATICA: ICD-10-CM

## 2025-07-06 DIAGNOSIS — G89.4 CHRONIC PAIN SYNDROME: ICD-10-CM

## 2025-07-07 NOTE — TELEPHONE ENCOUNTER
Needs prescription for hydrocodone-acetaminophen that starts 7/22/25 as appointment isn't until 8/6/2025.     Patient states she is planning on going to [facility] and needs Dr. Norris to call pre-service # to get discount as she states she has lost her insurance, [phone].

## 2025-07-08 RX ORDER — HYDROCODONE BITARTRATE AND ACETAMINOPHEN 10; 325 MG/1; MG/1
2 TABLET ORAL EVERY 6 HOURS PRN
Qty: 100 TABLET | Refills: 0 | Status: SHIPPED | OUTPATIENT
Start: 2025-08-03 | End: 2025-08-17

## 2025-07-08 RX ORDER — HYDROCODONE BITARTRATE AND ACETAMINOPHEN 10; 325 MG/1; MG/1
2 TABLET ORAL EVERY 6 HOURS PRN
Qty: 100 TABLET | Refills: 0 | Status: SHIPPED | OUTPATIENT
Start: 2025-07-20 | End: 2025-08-03

## 2025-07-08 NOTE — TELEPHONE ENCOUNTER
Hydrocodone sent for 14 days fill, set for later this month and early August before visit. Will wes Julius at 8.6 visit.

## 2025-07-31 ENCOUNTER — PATIENT MESSAGE (OUTPATIENT)
Dept: FAMILY MEDICINE CLINIC | Facility: CLINIC | Age: 60
End: 2025-07-31

## 2025-07-31 DIAGNOSIS — M54.50 CHRONIC MIDLINE LOW BACK PAIN WITHOUT SCIATICA: ICD-10-CM

## 2025-07-31 DIAGNOSIS — G89.29 CHRONIC MIDLINE LOW BACK PAIN WITHOUT SCIATICA: ICD-10-CM

## 2025-07-31 DIAGNOSIS — G89.4 CHRONIC PAIN SYNDROME: ICD-10-CM

## 2025-08-02 RX ORDER — FENTANYL 25 UG/1
1 PATCH TRANSDERMAL
Qty: 10 PATCH | Refills: 0 | Status: SHIPPED | OUTPATIENT
Start: 2025-08-02 | End: 2025-09-01

## 2025-08-06 ENCOUNTER — TELEMEDICINE (OUTPATIENT)
Dept: FAMILY MEDICINE CLINIC | Facility: CLINIC | Age: 60
End: 2025-08-06

## 2025-08-06 DIAGNOSIS — G89.4 CHRONIC PAIN SYNDROME: ICD-10-CM

## 2025-08-06 DIAGNOSIS — G89.29 CHRONIC MIDLINE LOW BACK PAIN WITHOUT SCIATICA: ICD-10-CM

## 2025-08-06 DIAGNOSIS — M54.50 CHRONIC MIDLINE LOW BACK PAIN WITHOUT SCIATICA: ICD-10-CM

## 2025-08-06 PROCEDURE — 99214 OFFICE O/P EST MOD 30 MIN: CPT | Performed by: FAMILY MEDICINE

## 2025-08-06 RX ORDER — HYDROCODONE BITARTRATE AND ACETAMINOPHEN 10; 325 MG/1; MG/1
2 TABLET ORAL EVERY 6 HOURS PRN
Qty: 100 TABLET | Refills: 0 | Status: SHIPPED | OUTPATIENT
Start: 2025-10-29 | End: 2025-11-12

## 2025-08-06 RX ORDER — HYDROCODONE BITARTRATE AND ACETAMINOPHEN 10; 325 MG/1; MG/1
2 TABLET ORAL EVERY 6 HOURS PRN
Qty: 100 TABLET | Refills: 0 | Status: SHIPPED | OUTPATIENT
Start: 2025-09-03 | End: 2025-09-17

## 2025-08-06 RX ORDER — FENTANYL 25 UG/1
1 PATCH TRANSDERMAL
Qty: 10 PATCH | Refills: 0 | Status: SHIPPED | OUTPATIENT
Start: 2025-10-01 | End: 2025-10-31

## 2025-08-06 RX ORDER — HYDROCODONE BITARTRATE AND ACETAMINOPHEN 10; 325 MG/1; MG/1
2 TABLET ORAL EVERY 6 HOURS PRN
Qty: 100 TABLET | Refills: 0 | Status: SHIPPED | OUTPATIENT
Start: 2025-10-15 | End: 2025-10-29

## 2025-08-06 RX ORDER — HYDROCODONE BITARTRATE AND ACETAMINOPHEN 10; 325 MG/1; MG/1
2 TABLET ORAL EVERY 6 HOURS PRN
Qty: 100 TABLET | Refills: 0 | Status: SHIPPED | OUTPATIENT
Start: 2025-08-20 | End: 2025-09-03

## 2025-08-06 RX ORDER — HYDROCODONE BITARTRATE AND ACETAMINOPHEN 10; 325 MG/1; MG/1
2 TABLET ORAL EVERY 6 HOURS PRN
Qty: 100 TABLET | Refills: 0 | Status: SHIPPED | OUTPATIENT
Start: 2025-09-17 | End: 2025-10-01

## 2025-08-06 RX ORDER — FENTANYL 25 UG/1
1 PATCH TRANSDERMAL
Qty: 10 PATCH | Refills: 0 | Status: SHIPPED | OUTPATIENT
Start: 2025-09-03 | End: 2025-10-03

## 2025-08-06 RX ORDER — HYDROCODONE BITARTRATE AND ACETAMINOPHEN 10; 325 MG/1; MG/1
2 TABLET ORAL EVERY 6 HOURS PRN
Qty: 100 TABLET | Refills: 0 | Status: SHIPPED | OUTPATIENT
Start: 2025-08-06 | End: 2025-08-20

## 2025-08-06 RX ORDER — HYDROCODONE BITARTRATE AND ACETAMINOPHEN 10; 325 MG/1; MG/1
2 TABLET ORAL EVERY 6 HOURS PRN
Qty: 100 TABLET | Refills: 0 | Status: SHIPPED | OUTPATIENT
Start: 2025-10-01 | End: 2025-10-15

## (undated) DIAGNOSIS — G47.10 HYPERSOMNIA: ICD-10-CM

## (undated) DIAGNOSIS — F33.0 MILD RECURRENT MAJOR DEPRESSION (HCC): Chronic | ICD-10-CM

## (undated) DIAGNOSIS — G04.81: Primary | Chronic | ICD-10-CM

## (undated) DIAGNOSIS — G04.81: Chronic | ICD-10-CM

## (undated) DEVICE — Device

## (undated) DEVICE — KIT HEMSTAT MTRX 8ML PORCINE GEL HUM THROM

## (undated) DEVICE — SOLUTION IRRIG 1000ML 0.9% NACL USP BTL

## (undated) DEVICE — DRAPE UTIL L W18XL24IN PLAS STR ADH REINF

## (undated) DEVICE — MARKER SKIN PREP RESIST STRL

## (undated) DEVICE — SLEEVE COMPR M KNEE LEN SGL USE KENDALL SCD

## (undated) DEVICE — E-Z CLEAN, NON-STICK, PTFE COATED, ELECTROSURGICAL BLADE ELECTRODE, MODIFIED EXTENDED INSULATION, 4 INCH (10.2 CM): Brand: MEGADYNE

## (undated) DEVICE — ZEISS MD DRAPE

## (undated) DEVICE — PAD N ADH W3XL8IN POLY COT SFT PERF FLM ABSRB

## (undated) DEVICE — LIGHT HANDLE

## (undated) DEVICE — STERILE POLYISOPRENE POWDER-FREE SURGICAL GLOVES WITH EMOLLIENT COATING: Brand: PROTEXIS

## (undated) DEVICE — LAMINECTOMY CDS: Brand: MEDLINE INDUSTRIES, INC.

## (undated) DEVICE — 3.0MM PRECISION NEURO (MATCH HEAD)

## (undated) DEVICE — GLOVE SUR 8 SENSICARE PIP WHT PWD F

## (undated) DEVICE — SPONGE GZ 4XL4IN 100% COT 12 PLY TYP VII WVN

## (undated) DEVICE — UNDYED BRAIDED (POLYGLACTIN 910), SYNTHETIC ABSORBABLE SUTURE: Brand: COATED VICRYL

## (undated) DEVICE — SOLUTION RUBBING 4OZ 70% ISO ALC CLR

## (undated) DEVICE — 3M(TM) TEGADERM(TM) TRANSPARENT FILM DRESSING FRAME STYLE 1628: Brand: 3M™ TEGADERM™

## (undated) DEVICE — 3M™ TEGADERM™ TRANSPARENT FILM DRESSING, 1626W, 4 IN X 4-3/4 IN (10 CM X 12 CM), 50 EACH/CARTON, 4 CARTON/CASE: Brand: 3M™ TEGADERM™

## (undated) DEVICE — Device: Brand: INTELLICART™

## (undated) DEVICE — ELECTRODE ES L2.75IN XLN STD BLDE MOD E-Z CLN

## (undated) DEVICE — SUTURE VCRL SZ 2-0 L18IN ABSRB VLT L26MM CT-2

## (undated) DEVICE — SUTURE VCRL SZ 0 L18IN ABSRB VLT L36MM CT-1

## (undated) NOTE — MR AVS SNAPSHOT
[facility]  [place],  [address] [place] [address] [phone]               Thank you for choosing us for your health care visit with No Oden MD.  We are glad to serve you and happy to provide you with this summa recent med list.                ibuprofen 200 MG Tabs   Take 600 mg by mouth every 4 (four) hours as needed for Pain.    Commonly known as:  MOTRIN           Levothyroxine Sodium 100 MCG Tabs   Take 1 tablet (100 mcg total) by mouth before breakfast.   Comm

## (undated) NOTE — ED AVS SNAPSHOT
Francis Leanna   MRN: [id number]    Department:  [facility] Emergency Department   Date of Visit:  1/14/2019           Disclosure     Insurance plans vary and the physician(s) referred by the ER may not be covered by your plan.  Please contact yo tell this physician (or your personal doctor if your instructions are to return to your personal doctor) about any new or lasting problems. The primary care or specialist physician will see patients referred from the [facility] Emergency Department.  Blue Mena

## (undated) NOTE — MR AVS SNAPSHOT
[facility], [place] [address] [facility], [address] [place] [address] [phone]               Thank you for choosing us for your health care visit with Beto Thomas MD.  We are glad to serve you and happy to provide you with this family member will be picking up prescription, office must be given name of individual in advance and they must present an ID as well. ? The name of the person picking up your prescription must be documented in your chart.   Scheduling Tests    If your phy 137 [facility] [address] [phone]              Allergies as of Feb 17, 2017     No Known Allergies                Today's Vital Signs     BP Pulse Height Weight BMI    118/64 mmHg 96 63\" 106 lb 18.78 kg/m2         Current Medications          This list i

## (undated) NOTE — LETTER
[facility], 117 [facility], [address] 117 [facility]  [phone]     Kylee Alberts MD  • Naman Montilla MD • Letty May MD • DO Declan Kathleen PA-C, Laura Marks, [place]    3/17/20

## (undated) NOTE — Clinical Note
2017    RE: Kala Templeton  : [birth year]        INSTRUCTIONS TO WEAN FENTANYL PATCHES:    WITH NEXT PATCH CHANGE:    DECREASE FENTANYL TO ONE 25 MCG PATCH FOR THE NEXT TWO WEEKS, THEN    DECREASE FENTANYL TO ONE 12 MCG PATCH FOR THE NEXT TWO WEEKS,

## (undated) NOTE — LETTER
Date: 2/7/2025    Patient Name: Mini Dorsey          To Whom it may concern:    This letter has been written at the patient's request.    Mini Dorsey has been under my care for over 20 years. She was diagnosed with Hashimoto's encephalitis at [facility] in 2006 has had recurrent episodes of severe disabling pain since that time.  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  This condition has been unchanged with decreased level of function but chronic pain.  Currently the chronic pain has been well-controlled with a fentanyl patch every 3 days as well as hydrocodone for multiple daily breakthrough pain episodes.  He has shown no episodes of abuse or diversion of this medication and has been very compliant with office visits.  This chronic encephalopathy has made it difficult for her to leave the home and she has been on disability for most of the time since the diagnosis in 2006.      Thank you,     Sincerely,       Hubert Norris MD

## (undated) NOTE — MR AVS SNAPSHOT
[address] [facility]  [address] [place] [address], [address] [place] [address] [phone]               Thank you for choosing us for your health care visit with Krystle Weinberg MD.  We are glad to serve you and happy to provide you with this family member will be picking up prescription, office must be given name of individual in advance and they must present an ID as well. ? The name of the person picking up your prescription must be documented in your chart.   Scheduling Tests    If your phy [address] [place] [id number]   [phone]              Allergies as of Feb 08, 2017     No Known Allergies                Today's Vital Signs     BP Pulse Height Weight BMI    113/82 mmHg 82 63\" 107 lb 18.96 kg/m2         Current Medications          This list i If you've recently had a stay at the Hospital you can access your discharge instructions in [facility] by going to Visits < Admission Summaries.  If you've been to the Emergency Department or your doctor's office, you can view your past visit information in My

## (undated) NOTE — LETTER
[facility], 117 [facility], [address] [place] [address] [phone] [facility] 121        [facility]  Stimulant Agreement    The purpose of this Agreement is to prevent misunderstandings about certain medi Self-medicate with legal controlled substances not prescribed to me. I promise to let my physician know of any medications prescribed to me by another  provider after  the date of this agreement.  Use of alcohol is contraindicated; if I  drink alcohol, I p Date   Time   Signature of Patient or Authorized Representative (with relationship to the Patient)  The Patient/Authorized Representative has read this form or had it read to him/her, states that he/she understands this information and has no further quest

## (undated) NOTE — LETTER
Date: 8/6/2020    Patient Name: Sofie Cassidy  YOB: 1965     To [place] [facility]:  [phone]    Sofie Cassidy is under my care since 1999.   Her records from June 2012 until the present should be easily available thro CSF OLIGOCLONAL BANDS NUMBER Latest Ref Range: 0 - 1 Bands 0      Mri from 1/27/2020:  CONCLUSION:     1. No enhancing lesions. 2. No hemorrhage or extra-axial fluid collection   3. No evidence of an acute infarct.    4. Minimal prominence of right temp

## (undated) NOTE — LETTER
1/31/2018    Terrie Patino  [address] [place] [phone]      Dear Jonh Jaffe:    Lazaro Bray had previously enrolled in our Chronic Care Management program and had been speaking with your care manager.  We have since made several attempts to reach

## (undated) NOTE — LETTER
OUTSIDE TESTING RESULT REQUEST     IMPORTANT: FOR YOUR IMMEDIATE ATTENTION  Please FAX all test results listed below to: [phone]     Testing already done on or about: 2023     * * * * If testing is NOT complete, arrange with patient A.S.A.P. * * * *      Patient Name: Peggy Serrano  Surgery Date: 10/10/2023  Medical Record: [id number]  CSN: [id number]  : 10/5/1965 - A: 59 y     Sex: female  Surgeon(s):  Kami Sidhu DO  Procedure: right lumbar 4-5 microdiscectomy  Anesthesia Type: General     Surgeon: Kami Sidhu DO     The following Testing and Time Line are REQUIRED PER ANESTHESIA     EKG READ AND SIGNED WITHIN   90 days  CBC [with Differential & Platelets] within  90 days  BMP (requires 4 hour fast) within  90 days  PT/INR within  30 days  PTT within  30 days  UA with Reflex to Culture within  30 days  Type and Screen for Pre-Admission Testing (must be within 28 days of surgery)  MSSA/MRSA Nasal screening within 30 days      Thank You,   Sent by:JOSE Kwan

## (undated) NOTE — LETTER
[facility], 117 [facility], [address] 117 [facility], [address] 2304 [facility] 121     4/21/2021    Twyla Sears  [address] [place] [address] Street [phone]    10/5/1965      Dear Twyla Sears,    Our records i

## (undated) NOTE — Clinical Note
2017    RE: Saira Soriano  : [birth year]    WEANING INSTRUCTIONS FOR FENTANYL    WITH NEXT PATCH CHANGE:    DECREASE FENTANYL TO ONE 25 MCG PATCH, CHANGE EVERY 3 DAYS FOR THE NEXT TWO WEEKS, THEN    DECREASE FENTANYL TO ONE 12 MCG PATCH, CHANGE YONATHAN

## (undated) NOTE — MR AVS SNAPSHOT
[facility], [place] [id number] [facility], [address] [place] [address] [phone]               Thank you for choosing us for your health care visit with ZEYAD Garza.   We are glad to serve you and happy to provide you with this sum ? EFFECTIVE April 1, 2017 PATIENTS MUST  THEIR OWN NARCOTIC PRESCRIPTIONS. ? Written prescriptions must be picked up in office. ? Please allow the office 48-72 hours to fill the prescription. ? Patient must present photo ID at time of . What changed:  Another medication with the same name was added. Make sure you understand how and when to take each. Commonly known as:  DURAGESIC           * fentaNYL 25 MCG/HR Pt72   Place 1 patch onto the skin every third day.  Patient to use with 12 mc For medical emergencies, dial 911.            Visit [facility] online at  [email or web address]

## (undated) NOTE — Clinical Note
[facility]NAGEMENT  [address] [facility] [address]             April 20, 2017    Saira Soriano  [address] [place] [address] [address] [phone]        Dear Shay Rick,     It was a pleasure speaking with you over the phone recently.  I want